# Patient Record
Sex: MALE | Race: WHITE | NOT HISPANIC OR LATINO
[De-identification: names, ages, dates, MRNs, and addresses within clinical notes are randomized per-mention and may not be internally consistent; named-entity substitution may affect disease eponyms.]

---

## 2017-01-25 ENCOUNTER — MED ADMIN CHARGE (OUTPATIENT)
Age: 82
End: 2017-01-25

## 2017-01-25 ENCOUNTER — APPOINTMENT (OUTPATIENT)
Dept: UROLOGY | Facility: CLINIC | Age: 82
End: 2017-01-25
Payer: COMMERCIAL

## 2017-01-25 ENCOUNTER — OUTPATIENT (OUTPATIENT)
Dept: OUTPATIENT SERVICES | Facility: HOSPITAL | Age: 82
LOS: 1 days | End: 2017-01-25
Payer: COMMERCIAL

## 2017-01-25 DIAGNOSIS — R35.0 FREQUENCY OF MICTURITION: ICD-10-CM

## 2017-01-25 DIAGNOSIS — Z98.89 OTHER SPECIFIED POSTPROCEDURAL STATES: Chronic | ICD-10-CM

## 2017-01-25 PROCEDURE — 96402 CHEMO HORMON ANTINEOPL SQ/IM: CPT

## 2017-01-25 PROCEDURE — 96402U: CUSTOM | Mod: NC

## 2017-01-25 RX ORDER — LEUPROLIDE ACETATE 22.5 MG
22.5 KIT INTRAMUSCULAR
Qty: 1 | Refills: 0 | Status: COMPLETED | OUTPATIENT
Start: 2017-01-25

## 2017-01-25 RX ADMIN — LEUPROLIDE ACETATE 0 MG: KIT at 00:00

## 2017-01-26 LAB
PSA FREE FLD-MCNC: 6 %
PSA FREE SERPL-MCNC: 0.06 NG/ML
PSA SERPL-MCNC: 1.04 NG/ML

## 2017-01-30 ENCOUNTER — APPOINTMENT (OUTPATIENT)
Dept: UROLOGY | Facility: CLINIC | Age: 82
End: 2017-01-30

## 2017-02-08 DIAGNOSIS — D40.0 NEOPLASM OF UNCERTAIN BEHAVIOR OF PROSTATE: ICD-10-CM

## 2017-02-08 DIAGNOSIS — C61 MALIGNANT NEOPLASM OF PROSTATE: ICD-10-CM

## 2017-02-08 DIAGNOSIS — R97.20 ELEVATED PROSTATE SPECIFIC ANTIGEN [PSA]: ICD-10-CM

## 2017-03-01 ENCOUNTER — OUTPATIENT (OUTPATIENT)
Dept: OUTPATIENT SERVICES | Facility: HOSPITAL | Age: 82
LOS: 1 days | End: 2017-03-01
Payer: COMMERCIAL

## 2017-03-01 VITALS
RESPIRATION RATE: 16 BRPM | TEMPERATURE: 98 F | DIASTOLIC BLOOD PRESSURE: 80 MMHG | HEIGHT: 68 IN | OXYGEN SATURATION: 99 % | WEIGHT: 130.07 LBS | HEART RATE: 73 BPM | SYSTOLIC BLOOD PRESSURE: 152 MMHG

## 2017-03-01 DIAGNOSIS — Z01.818 ENCOUNTER FOR OTHER PREPROCEDURAL EXAMINATION: ICD-10-CM

## 2017-03-01 DIAGNOSIS — N39.3 STRESS INCONTINENCE (FEMALE) (MALE): ICD-10-CM

## 2017-03-01 DIAGNOSIS — Z96.652 PRESENCE OF LEFT ARTIFICIAL KNEE JOINT: Chronic | ICD-10-CM

## 2017-03-01 DIAGNOSIS — Z96.0 PRESENCE OF UROGENITAL IMPLANTS: Chronic | ICD-10-CM

## 2017-03-01 DIAGNOSIS — Z98.89 OTHER SPECIFIED POSTPROCEDURAL STATES: Chronic | ICD-10-CM

## 2017-03-01 LAB
ANION GAP SERPL CALC-SCNC: 15 MMOL/L — SIGNIFICANT CHANGE UP (ref 5–17)
BUN SERPL-MCNC: 25 MG/DL — HIGH (ref 7–23)
CALCIUM SERPL-MCNC: 10 MG/DL — SIGNIFICANT CHANGE UP (ref 8.4–10.5)
CHLORIDE SERPL-SCNC: 104 MMOL/L — SIGNIFICANT CHANGE UP (ref 96–108)
CO2 SERPL-SCNC: 23 MMOL/L — SIGNIFICANT CHANGE UP (ref 22–31)
CREAT SERPL-MCNC: 0.99 MG/DL — SIGNIFICANT CHANGE UP (ref 0.5–1.3)
GLUCOSE SERPL-MCNC: 97 MG/DL — SIGNIFICANT CHANGE UP (ref 70–99)
HCT VFR BLD CALC: 40 % — SIGNIFICANT CHANGE UP (ref 39–50)
HGB BLD-MCNC: 13.1 G/DL — SIGNIFICANT CHANGE UP (ref 13–17)
MCHC RBC-ENTMCNC: 28.7 PG — SIGNIFICANT CHANGE UP (ref 27–34)
MCHC RBC-ENTMCNC: 32.8 GM/DL — SIGNIFICANT CHANGE UP (ref 32–36)
MCV RBC AUTO: 87.7 FL — SIGNIFICANT CHANGE UP (ref 80–100)
PLATELET # BLD AUTO: 208 K/UL — SIGNIFICANT CHANGE UP (ref 150–400)
POTASSIUM SERPL-MCNC: 4.5 MMOL/L — SIGNIFICANT CHANGE UP (ref 3.5–5.3)
POTASSIUM SERPL-SCNC: 4.5 MMOL/L — SIGNIFICANT CHANGE UP (ref 3.5–5.3)
RBC # BLD: 4.56 M/UL — SIGNIFICANT CHANGE UP (ref 4.2–5.8)
RBC # FLD: 14.5 % — SIGNIFICANT CHANGE UP (ref 10.3–14.5)
SODIUM SERPL-SCNC: 142 MMOL/L — SIGNIFICANT CHANGE UP (ref 135–145)
WBC # BLD: 9.14 K/UL — SIGNIFICANT CHANGE UP (ref 3.8–10.5)
WBC # FLD AUTO: 9.14 K/UL — SIGNIFICANT CHANGE UP (ref 3.8–10.5)

## 2017-03-01 PROCEDURE — G0463: CPT

## 2017-03-01 PROCEDURE — 87086 URINE CULTURE/COLONY COUNT: CPT

## 2017-03-01 PROCEDURE — 87186 SC STD MICRODIL/AGAR DIL: CPT

## 2017-03-01 PROCEDURE — 85027 COMPLETE CBC AUTOMATED: CPT

## 2017-03-01 PROCEDURE — 80048 BASIC METABOLIC PNL TOTAL CA: CPT

## 2017-03-01 RX ORDER — GENTAMICIN SULFATE 40 MG/ML
180 VIAL (ML) INJECTION ONCE
Qty: 0 | Refills: 0 | Status: DISCONTINUED | OUTPATIENT
Start: 2017-05-17 | End: 2017-06-01

## 2017-03-01 NOTE — H&P PST ADULT - PROBLEM SELECTOR PLAN 1
Artificial urinary sphincter revision, cystoscopy.   PST labs send  preprocedure instructions discussed

## 2017-03-01 NOTE — H&P PST ADULT - NSANTHOSAYNRD_GEN_A_CORE
No. LEO screening performed.  STOP BANG Legend: 0-2 = LOW Risk; 3-4 = INTERMEDIATE Risk; 5-8 = HIGH Risk

## 2017-03-01 NOTE — H&P PST ADULT - PSH
History of prostate surgery History of knee replacement, total, left  1999, s/p revision 2001  History of prostate surgery    Status post implantation of artificial urinary sphincter  1996

## 2017-03-01 NOTE — H&P PST ADULT - PMH
[place] (hard of hearing)    Prostate ca    Vertigo [place] (hard of hearing)  uses hearing aids PRN  Prostate ca  Lupron Inj Q 3 months  Vertigo  2005 and 12/2016, uses meclizine PRN

## 2017-03-01 NOTE — H&P PST ADULT - HISTORY OF PRESENT ILLNESS
82 year old male with PMH of prostate cancer s/p prostatectomy s/p Artificial urinary sphincter placement 1996 with complaints of mild incontinence planned for Artificial urinary sphincter revision, cystoscopy.

## 2017-03-01 NOTE — H&P PST ADULT - MUSCULOSKELETAL
details… detailed exam no joint warmth/no joint swelling/no joint erythema/no calf tenderness/normal strength

## 2017-03-06 ENCOUNTER — MESSAGE (OUTPATIENT)
Age: 82
End: 2017-03-06

## 2017-03-06 DIAGNOSIS — B96.20 URINARY TRACT INFECTION, SITE NOT SPECIFIED: ICD-10-CM

## 2017-03-06 DIAGNOSIS — N39.0 URINARY TRACT INFECTION, SITE NOT SPECIFIED: ICD-10-CM

## 2017-03-09 ENCOUNTER — MESSAGE (OUTPATIENT)
Age: 82
End: 2017-03-09

## 2017-04-05 ENCOUNTER — MESSAGE (OUTPATIENT)
Age: 82
End: 2017-04-05

## 2017-04-19 ENCOUNTER — OUTPATIENT (OUTPATIENT)
Dept: OUTPATIENT SERVICES | Facility: HOSPITAL | Age: 82
LOS: 1 days | End: 2017-04-19
Payer: COMMERCIAL

## 2017-04-19 ENCOUNTER — APPOINTMENT (OUTPATIENT)
Dept: UROLOGY | Facility: CLINIC | Age: 82
End: 2017-04-19

## 2017-04-19 DIAGNOSIS — R35.0 FREQUENCY OF MICTURITION: ICD-10-CM

## 2017-04-19 DIAGNOSIS — Z96.0 PRESENCE OF UROGENITAL IMPLANTS: Chronic | ICD-10-CM

## 2017-04-19 DIAGNOSIS — Z96.652 PRESENCE OF LEFT ARTIFICIAL KNEE JOINT: Chronic | ICD-10-CM

## 2017-04-19 DIAGNOSIS — Z98.89 OTHER SPECIFIED POSTPROCEDURAL STATES: Chronic | ICD-10-CM

## 2017-04-19 PROCEDURE — 96402 CHEMO HORMON ANTINEOPL SQ/IM: CPT

## 2017-04-20 LAB
PSA FREE FLD-MCNC: 5.3 %
PSA FREE SERPL-MCNC: 0.06 NG/ML
PSA SERPL-MCNC: 1.2 NG/ML

## 2017-04-21 ENCOUNTER — OUTPATIENT (OUTPATIENT)
Dept: OUTPATIENT SERVICES | Facility: HOSPITAL | Age: 82
LOS: 1 days | End: 2017-04-21
Payer: COMMERCIAL

## 2017-04-21 VITALS
OXYGEN SATURATION: 98 % | DIASTOLIC BLOOD PRESSURE: 80 MMHG | SYSTOLIC BLOOD PRESSURE: 150 MMHG | HEART RATE: 68 BPM | TEMPERATURE: 98 F | RESPIRATION RATE: 16 BRPM | WEIGHT: 132.06 LBS | HEIGHT: 68 IN

## 2017-04-21 DIAGNOSIS — N39.3 STRESS INCONTINENCE (FEMALE) (MALE): ICD-10-CM

## 2017-04-21 DIAGNOSIS — Z01.818 ENCOUNTER FOR OTHER PREPROCEDURAL EXAMINATION: ICD-10-CM

## 2017-04-21 DIAGNOSIS — Z96.652 PRESENCE OF LEFT ARTIFICIAL KNEE JOINT: Chronic | ICD-10-CM

## 2017-04-21 DIAGNOSIS — Z96.0 PRESENCE OF UROGENITAL IMPLANTS: Chronic | ICD-10-CM

## 2017-04-21 DIAGNOSIS — Z98.89 OTHER SPECIFIED POSTPROCEDURAL STATES: Chronic | ICD-10-CM

## 2017-04-21 LAB
ANION GAP SERPL CALC-SCNC: 18 MMOL/L — HIGH (ref 5–17)
BUN SERPL-MCNC: 25 MG/DL — HIGH (ref 7–23)
CALCIUM SERPL-MCNC: 9.8 MG/DL — SIGNIFICANT CHANGE UP (ref 8.4–10.5)
CHLORIDE SERPL-SCNC: 104 MMOL/L — SIGNIFICANT CHANGE UP (ref 96–108)
CO2 SERPL-SCNC: 22 MMOL/L — SIGNIFICANT CHANGE UP (ref 22–31)
CREAT SERPL-MCNC: 0.83 MG/DL — SIGNIFICANT CHANGE UP (ref 0.5–1.3)
GLUCOSE SERPL-MCNC: 160 MG/DL — HIGH (ref 70–99)
POTASSIUM SERPL-MCNC: 4.7 MMOL/L — SIGNIFICANT CHANGE UP (ref 3.5–5.3)
POTASSIUM SERPL-SCNC: 4.7 MMOL/L — SIGNIFICANT CHANGE UP (ref 3.5–5.3)
SODIUM SERPL-SCNC: 144 MMOL/L — SIGNIFICANT CHANGE UP (ref 135–145)

## 2017-04-21 PROCEDURE — 36415 COLL VENOUS BLD VENIPUNCTURE: CPT

## 2017-04-21 PROCEDURE — 87086 URINE CULTURE/COLONY COUNT: CPT

## 2017-04-21 PROCEDURE — 80048 BASIC METABOLIC PNL TOTAL CA: CPT

## 2017-04-21 PROCEDURE — G0463: CPT

## 2017-04-21 RX ORDER — ZOLPIDEM TARTRATE 10 MG/1
1 TABLET ORAL
Qty: 0 | Refills: 0 | COMMUNITY
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
## 2017-04-21 NOTE — H&P PST ADULT - PSH
History of knee replacement, total, left  1999, s/p revision 2001  History of prostate surgery  1992  Status post implantation of artificial urinary sphincter  1996

## 2017-04-21 NOTE — H&P PST ADULT - PMH
Fall  3/15/17, slipped on ice , injured left side of the body, denies broken bones, still with minor soreness.  [place] (hard of hearing)  uses hearing aids PRN  Hypertension    Prostate ca  1992, Lupron Inj Q 3 months  Vertigo  2005 and 12/2016, uses meclizine PRN, last episode 12/2016

## 2017-04-22 LAB
CULTURE RESULTS: NO GROWTH — SIGNIFICANT CHANGE UP
SPECIMEN SOURCE: SIGNIFICANT CHANGE UP

## 2017-04-27 DIAGNOSIS — C61 MALIGNANT NEOPLASM OF PROSTATE: ICD-10-CM

## 2017-04-27 DIAGNOSIS — R97.20 ELEVATED PROSTATE SPECIFIC ANTIGEN [PSA]: ICD-10-CM

## 2017-05-17 ENCOUNTER — TRANSCRIPTION ENCOUNTER (OUTPATIENT)
Age: 82
End: 2017-05-17

## 2017-05-17 ENCOUNTER — APPOINTMENT (OUTPATIENT)
Dept: UROLOGY | Facility: HOSPITAL | Age: 82
End: 2017-05-17

## 2017-05-17 ENCOUNTER — RESULT REVIEW (OUTPATIENT)
Age: 82
End: 2017-05-17

## 2017-05-17 ENCOUNTER — OUTPATIENT (OUTPATIENT)
Dept: OUTPATIENT SERVICES | Facility: HOSPITAL | Age: 82
LOS: 1 days | End: 2017-05-17
Payer: COMMERCIAL

## 2017-05-17 VITALS
DIASTOLIC BLOOD PRESSURE: 79 MMHG | WEIGHT: 132.06 LBS | OXYGEN SATURATION: 99 % | RESPIRATION RATE: 18 BRPM | SYSTOLIC BLOOD PRESSURE: 144 MMHG | TEMPERATURE: 98 F | HEIGHT: 68 IN

## 2017-05-17 VITALS
TEMPERATURE: 98 F | SYSTOLIC BLOOD PRESSURE: 135 MMHG | OXYGEN SATURATION: 98 % | RESPIRATION RATE: 20 BRPM | DIASTOLIC BLOOD PRESSURE: 70 MMHG | HEART RATE: 96 BPM

## 2017-05-17 DIAGNOSIS — Z01.818 ENCOUNTER FOR OTHER PREPROCEDURAL EXAMINATION: ICD-10-CM

## 2017-05-17 DIAGNOSIS — Z98.89 OTHER SPECIFIED POSTPROCEDURAL STATES: Chronic | ICD-10-CM

## 2017-05-17 DIAGNOSIS — Z96.652 PRESENCE OF LEFT ARTIFICIAL KNEE JOINT: Chronic | ICD-10-CM

## 2017-05-17 DIAGNOSIS — Z96.0 PRESENCE OF UROGENITAL IMPLANTS: Chronic | ICD-10-CM

## 2017-05-17 DIAGNOSIS — N39.3 STRESS INCONTINENCE (FEMALE) (MALE): ICD-10-CM

## 2017-05-17 PROCEDURE — 88300 SURGICAL PATH GROSS: CPT

## 2017-05-17 PROCEDURE — C1815: CPT

## 2017-05-17 PROCEDURE — 53445 INSERT URO/VES NCK SPHINCTER: CPT

## 2017-05-17 PROCEDURE — 52000 CYSTOURETHROSCOPY: CPT | Mod: 59

## 2017-05-17 PROCEDURE — 88300 SURGICAL PATH GROSS: CPT | Mod: 26

## 2017-05-17 PROCEDURE — 53447 REMOVE/REPLACE UR SPHINCTER: CPT

## 2017-05-17 RX ORDER — HYDROMORPHONE HYDROCHLORIDE 2 MG/ML
0.25 INJECTION INTRAMUSCULAR; INTRAVENOUS; SUBCUTANEOUS
Qty: 0 | Refills: 0 | Status: DISCONTINUED | OUTPATIENT
Start: 2017-05-17 | End: 2017-05-17

## 2017-05-17 RX ORDER — SODIUM CHLORIDE 9 MG/ML
3 INJECTION INTRAMUSCULAR; INTRAVENOUS; SUBCUTANEOUS EVERY 8 HOURS
Qty: 0 | Refills: 0 | Status: DISCONTINUED | OUTPATIENT
Start: 2017-05-17 | End: 2017-05-17

## 2017-05-17 RX ORDER — ONDANSETRON 8 MG/1
4 TABLET, FILM COATED ORAL ONCE
Qty: 0 | Refills: 0 | Status: COMPLETED | OUTPATIENT
Start: 2017-05-17 | End: 2017-05-17

## 2017-05-17 RX ORDER — ACETAMINOPHEN WITH CODEINE 300MG-30MG
1 TABLET ORAL
Qty: 20 | Refills: 0
Start: 2017-05-17 | End: 2017-05-22

## 2017-05-17 RX ORDER — SODIUM CHLORIDE 9 MG/ML
1000 INJECTION, SOLUTION INTRAVENOUS
Qty: 0 | Refills: 0 | Status: DISCONTINUED | OUTPATIENT
Start: 2017-05-17 | End: 2017-06-01

## 2017-05-17 RX ORDER — DOCUSATE SODIUM 100 MG
1 CAPSULE ORAL
Qty: 40 | Refills: 0
Start: 2017-05-17 | End: 2017-06-06

## 2017-05-17 RX ORDER — VANCOMYCIN HCL 1 G
1000 VIAL (EA) INTRAVENOUS ONCE
Qty: 0 | Refills: 0 | Status: COMPLETED | OUTPATIENT
Start: 2017-05-17 | End: 2017-05-17

## 2017-05-17 RX ORDER — CIPROFLOXACIN LACTATE 400MG/40ML
1 VIAL (ML) INTRAVENOUS
Qty: 7 | Refills: 1
Start: 2017-05-17 | End: 2017-05-30

## 2017-05-17 RX ADMIN — SODIUM CHLORIDE 3 MILLILITER(S): 9 INJECTION INTRAMUSCULAR; INTRAVENOUS; SUBCUTANEOUS at 06:43

## 2017-05-17 RX ADMIN — SODIUM CHLORIDE 125 MILLILITER(S): 9 INJECTION, SOLUTION INTRAVENOUS at 10:33

## 2017-05-17 RX ADMIN — Medication 150 MILLIGRAM(S): at 06:46

## 2017-05-17 RX ADMIN — ONDANSETRON 4 MILLIGRAM(S): 8 TABLET, FILM COATED ORAL at 13:43

## 2017-05-17 NOTE — ASU DISCHARGE PLAN (ADULT/PEDIATRIC). - MEDICATION SUMMARY - MEDICATIONS TO TAKE
I will START or STAY ON the medications listed below when I get home from the hospital:    acetaminophen-codeine 300 mg-15 mg oral tablet  -- 1 tab(s) by mouth every 6 hours MDD:4  -- Caution federal law prohibits the transfer of this drug to any person other  than the person for whom it was prescribed.  May cause drowsiness.  Alcohol may intensify this effect.  Use care when operating dangerous machinery.  This product contains acetaminophen.  Do not use  with any other product containing acetaminophen to prevent possible liver damage.  Using more of this medication than prescribed may cause serious breathing problems.    -- Indication: For pain    Lupron Depot 22.5 mg/3 months intramuscular injection, extended release  --  intramuscular every 3 month, last dose 4/19/17  -- Indication: For prostate cancer    zolpidem 10 mg oral tablet  -- 0.5 tab(s) by mouth once a day (at bedtime), As Needed  -- Indication: For home medication    amLODIPine 5 mg oral tablet  -- 1 tab(s) by mouth once a day  -- Indication: For home medication    Colace sodium 100 mg oral capsule  -- 1 cap(s) by mouth 2 times a day  -- Medication should be taken with plenty of water.    -- Indication: For constipation    ciprofloxacin 500 mg oral tablet, extended release  -- 1 tab(s) by mouth every 24 hours  -- Avoid prolonged or excessive exposure to direct and/or artificial sunlight while taking this medication.  Check with your doctor before becoming pregnant.  Do not take dairy products, antacids, or iron preparations within one hour of this medication.  Finish all this medication unless otherwise directed by prescriber.  It is very important that you take or use this exactly as directed.  Do not skip doses or discontinue unless directed by your doctor.  Medication should be taken with plenty of water.  Obtain medical advice before taking any non-prescription drugs as some may affect the action of this medication.  Swallow whole.  Do not crush.  This drug may impair the ability to drive or operate machinery.  Use care until you become familiar with its effects.    -- Indication: For antibiotic    Vitamin D2 50,000 intl units (1.25 mg) oral capsule  -- 1 cap(s) by mouth once a week  -- Indication: For home medication

## 2017-05-18 ENCOUNTER — APPOINTMENT (OUTPATIENT)
Dept: UROLOGY | Facility: CLINIC | Age: 82
End: 2017-05-18

## 2017-05-18 VITALS
WEIGHT: 130 LBS | RESPIRATION RATE: 17 BRPM | HEART RATE: 75 BPM | DIASTOLIC BLOOD PRESSURE: 72 MMHG | TEMPERATURE: 98.2 F | HEIGHT: 68 IN | BODY MASS INDEX: 19.7 KG/M2 | SYSTOLIC BLOOD PRESSURE: 145 MMHG

## 2017-05-22 ENCOUNTER — CHART COPY (OUTPATIENT)
Age: 82
End: 2017-05-22

## 2017-05-22 ENCOUNTER — EMERGENCY (EMERGENCY)
Facility: HOSPITAL | Age: 82
LOS: 1 days | Discharge: HOME | End: 2017-05-22
Attending: EMERGENCY MEDICINE | Admitting: EMERGENCY MEDICINE
Payer: COMMERCIAL

## 2017-05-22 VITALS
DIASTOLIC BLOOD PRESSURE: 74 MMHG | SYSTOLIC BLOOD PRESSURE: 129 MMHG | RESPIRATION RATE: 17 BRPM | TEMPERATURE: 98 F | HEART RATE: 82 BPM | OXYGEN SATURATION: 99 %

## 2017-05-22 VITALS
OXYGEN SATURATION: 100 % | DIASTOLIC BLOOD PRESSURE: 81 MMHG | RESPIRATION RATE: 18 BRPM | HEART RATE: 66 BPM | SYSTOLIC BLOOD PRESSURE: 160 MMHG

## 2017-05-22 DIAGNOSIS — Z96.0 PRESENCE OF UROGENITAL IMPLANTS: Chronic | ICD-10-CM

## 2017-05-22 DIAGNOSIS — Z98.89 OTHER SPECIFIED POSTPROCEDURAL STATES: Chronic | ICD-10-CM

## 2017-05-22 DIAGNOSIS — I10 ESSENTIAL (PRIMARY) HYPERTENSION: ICD-10-CM

## 2017-05-22 DIAGNOSIS — R41.82 ALTERED MENTAL STATUS, UNSPECIFIED: ICD-10-CM

## 2017-05-22 DIAGNOSIS — N39.0 URINARY TRACT INFECTION, SITE NOT SPECIFIED: ICD-10-CM

## 2017-05-22 DIAGNOSIS — Z88.0 ALLERGY STATUS TO PENICILLIN: ICD-10-CM

## 2017-05-22 DIAGNOSIS — Z96.652 PRESENCE OF LEFT ARTIFICIAL KNEE JOINT: Chronic | ICD-10-CM

## 2017-05-22 DIAGNOSIS — Z79.899 OTHER LONG TERM (CURRENT) DRUG THERAPY: ICD-10-CM

## 2017-05-22 DIAGNOSIS — Z96.652 PRESENCE OF LEFT ARTIFICIAL KNEE JOINT: ICD-10-CM

## 2017-05-22 DIAGNOSIS — Z98.890 OTHER SPECIFIED POSTPROCEDURAL STATES: ICD-10-CM

## 2017-05-22 LAB
ALBUMIN SERPL ELPH-MCNC: 3.9 G/DL — SIGNIFICANT CHANGE UP (ref 3.3–5)
ALP SERPL-CCNC: 75 U/L — SIGNIFICANT CHANGE UP (ref 40–120)
ALT FLD-CCNC: 9 U/L RC — LOW (ref 10–45)
ANION GAP SERPL CALC-SCNC: 12 MMOL/L — SIGNIFICANT CHANGE UP (ref 5–17)
APPEARANCE UR: ABNORMAL
APTT BLD: 29.6 SEC — SIGNIFICANT CHANGE UP (ref 27.5–37.4)
AST SERPL-CCNC: 16 U/L — SIGNIFICANT CHANGE UP (ref 10–40)
BACTERIA # UR AUTO: ABNORMAL /HPF
BASOPHILS # BLD AUTO: 0 K/UL — SIGNIFICANT CHANGE UP (ref 0–0.2)
BASOPHILS NFR BLD AUTO: 0.2 % — SIGNIFICANT CHANGE UP (ref 0–2)
BILIRUB SERPL-MCNC: 0.5 MG/DL — SIGNIFICANT CHANGE UP (ref 0.2–1.2)
BILIRUB UR-MCNC: NEGATIVE — SIGNIFICANT CHANGE UP
BUN SERPL-MCNC: 22 MG/DL — SIGNIFICANT CHANGE UP (ref 7–23)
CALCIUM SERPL-MCNC: 9.4 MG/DL — SIGNIFICANT CHANGE UP (ref 8.4–10.5)
CHLORIDE SERPL-SCNC: 108 MMOL/L — SIGNIFICANT CHANGE UP (ref 96–108)
CO2 SERPL-SCNC: 26 MMOL/L — SIGNIFICANT CHANGE UP (ref 22–31)
COLOR SPEC: YELLOW — SIGNIFICANT CHANGE UP
COMMENT - URINE: SIGNIFICANT CHANGE UP
CREAT SERPL-MCNC: 0.94 MG/DL — SIGNIFICANT CHANGE UP (ref 0.5–1.3)
DIFF PNL FLD: NEGATIVE — SIGNIFICANT CHANGE UP
EOSINOPHIL # BLD AUTO: 0.6 K/UL — HIGH (ref 0–0.5)
EOSINOPHIL NFR BLD AUTO: 6.4 % — HIGH (ref 0–6)
EPI CELLS # UR: SIGNIFICANT CHANGE UP /HPF
GAS PNL BLDV: SIGNIFICANT CHANGE UP
GLUCOSE SERPL-MCNC: 92 MG/DL — SIGNIFICANT CHANGE UP (ref 70–99)
GLUCOSE UR QL: NEGATIVE — SIGNIFICANT CHANGE UP
HCT VFR BLD CALC: 37.1 % — LOW (ref 39–50)
HGB BLD-MCNC: 12.4 G/DL — LOW (ref 13–17)
INR BLD: 1.02 RATIO — SIGNIFICANT CHANGE UP (ref 0.88–1.16)
KETONES UR-MCNC: NEGATIVE — SIGNIFICANT CHANGE UP
LEUKOCYTE ESTERASE UR-ACNC: ABNORMAL
LYMPHOCYTES # BLD AUTO: 1.8 K/UL — SIGNIFICANT CHANGE UP (ref 1–3.3)
LYMPHOCYTES # BLD AUTO: 20.1 % — SIGNIFICANT CHANGE UP (ref 13–44)
MCHC RBC-ENTMCNC: 30.1 PG — SIGNIFICANT CHANGE UP (ref 27–34)
MCHC RBC-ENTMCNC: 33.5 GM/DL — SIGNIFICANT CHANGE UP (ref 32–36)
MCV RBC AUTO: 89.6 FL — SIGNIFICANT CHANGE UP (ref 80–100)
MONOCYTES # BLD AUTO: 0.6 K/UL — SIGNIFICANT CHANGE UP (ref 0–0.9)
MONOCYTES NFR BLD AUTO: 6.7 % — SIGNIFICANT CHANGE UP (ref 2–14)
NEUTROPHILS # BLD AUTO: 5.9 K/UL — SIGNIFICANT CHANGE UP (ref 1.8–7.4)
NEUTROPHILS NFR BLD AUTO: 66.5 % — SIGNIFICANT CHANGE UP (ref 43–77)
NITRITE UR-MCNC: NEGATIVE — SIGNIFICANT CHANGE UP
PH UR: 5.5 — SIGNIFICANT CHANGE UP (ref 5–8)
PLATELET # BLD AUTO: 204 K/UL — SIGNIFICANT CHANGE UP (ref 150–400)
POTASSIUM SERPL-MCNC: 4.1 MMOL/L — SIGNIFICANT CHANGE UP (ref 3.5–5.3)
POTASSIUM SERPL-SCNC: 4.1 MMOL/L — SIGNIFICANT CHANGE UP (ref 3.5–5.3)
PROT SERPL-MCNC: 6.4 G/DL — SIGNIFICANT CHANGE UP (ref 6–8.3)
PROT UR-MCNC: 30 MG/DL
PROTHROM AB SERPL-ACNC: 11.1 SEC — SIGNIFICANT CHANGE UP (ref 9.8–12.7)
RBC # BLD: 4.14 M/UL — LOW (ref 4.2–5.8)
RBC # FLD: 12.3 % — SIGNIFICANT CHANGE UP (ref 10.3–14.5)
RBC CASTS # UR COMP ASSIST: SIGNIFICANT CHANGE UP /HPF (ref 0–2)
SODIUM SERPL-SCNC: 146 MMOL/L — HIGH (ref 135–145)
SP GR SPEC: 1.03 — HIGH (ref 1.01–1.02)
UROBILINOGEN FLD QL: 1
WBC # BLD: 8.9 K/UL — SIGNIFICANT CHANGE UP (ref 3.8–10.5)
WBC # FLD AUTO: 8.9 K/UL — SIGNIFICANT CHANGE UP (ref 3.8–10.5)
WBC UR QL: >50 /HPF (ref 0–5)

## 2017-05-22 PROCEDURE — 85014 HEMATOCRIT: CPT

## 2017-05-22 PROCEDURE — 70450 CT HEAD/BRAIN W/O DYE: CPT

## 2017-05-22 PROCEDURE — 84132 ASSAY OF SERUM POTASSIUM: CPT

## 2017-05-22 PROCEDURE — 84295 ASSAY OF SERUM SODIUM: CPT

## 2017-05-22 PROCEDURE — 82330 ASSAY OF CALCIUM: CPT

## 2017-05-22 PROCEDURE — 71045 X-RAY EXAM CHEST 1 VIEW: CPT

## 2017-05-22 PROCEDURE — 70450 CT HEAD/BRAIN W/O DYE: CPT | Mod: 26

## 2017-05-22 PROCEDURE — 71010: CPT | Mod: 26

## 2017-05-22 PROCEDURE — 85730 THROMBOPLASTIN TIME PARTIAL: CPT

## 2017-05-22 PROCEDURE — 80053 COMPREHEN METABOLIC PANEL: CPT

## 2017-05-22 PROCEDURE — 83605 ASSAY OF LACTIC ACID: CPT

## 2017-05-22 PROCEDURE — 85027 COMPLETE CBC AUTOMATED: CPT

## 2017-05-22 PROCEDURE — 99284 EMERGENCY DEPT VISIT MOD MDM: CPT | Mod: GC

## 2017-05-22 PROCEDURE — 82803 BLOOD GASES ANY COMBINATION: CPT

## 2017-05-22 PROCEDURE — 85610 PROTHROMBIN TIME: CPT

## 2017-05-22 PROCEDURE — 81001 URINALYSIS AUTO W/SCOPE: CPT

## 2017-05-22 PROCEDURE — 87086 URINE CULTURE/COLONY COUNT: CPT

## 2017-05-22 PROCEDURE — 82947 ASSAY GLUCOSE BLOOD QUANT: CPT

## 2017-05-22 PROCEDURE — 99284 EMERGENCY DEPT VISIT MOD MDM: CPT | Mod: 25

## 2017-05-22 PROCEDURE — 82435 ASSAY OF BLOOD CHLORIDE: CPT

## 2017-05-22 PROCEDURE — 82565 ASSAY OF CREATININE: CPT

## 2017-05-22 RX ORDER — CEFTRIAXONE 500 MG/1
1 INJECTION, POWDER, FOR SOLUTION INTRAMUSCULAR; INTRAVENOUS ONCE
Qty: 0 | Refills: 0 | Status: DISCONTINUED | OUTPATIENT
Start: 2017-05-22 | End: 2017-05-22

## 2017-05-22 RX ORDER — AZTREONAM 2 G
1 VIAL (EA) INJECTION
Qty: 20 | Refills: 0
Start: 2017-05-22 | End: 2017-06-01

## 2017-05-22 RX ADMIN — Medication 1 TABLET(S): at 21:20

## 2017-05-22 NOTE — ED ADULT NURSE NOTE - OBJECTIVE STATEMENT
Pt S/P artifical Urinary sphincter placement 6 days ago. Family C/O AMS with irrelevant  behaviour X 2 days As stated by the family pt was very hyperactive  agitated  & not  by himself Pt  was on Cipro family states he may be allergic to Cipro & they called the urologist who asked them to stop the medication  Family feels he is better with stopping the medication . pt still looks dull quite here  Obeys commands & looks pale . Emily N/V/D C/o constipation X 1 week.  afebrile here.Pt is been evaluate by the Ed team

## 2017-05-22 NOTE — ED ADULT NURSE REASSESSMENT NOTE - NS ED NURSE REASSESS COMMENT FT1
Pt received from Natividad HWANG.  Pt laying in stretcher comfortably, calm, no distress, family bedside, results reviewed in detail with family and POC and decision to admit discussed.  VSS.  Spoke with daughter separately and offered social work consult, daughter declines at this time, advised that she may accept services at any time.

## 2017-05-22 NOTE — ED ADULT NURSE NOTE - CAS EDN DISCHARGE ASSESSMENT
family states pt is back to his baseline mental status, they prefer to bring pt home/Awake/Alert and oriented to person, place and time

## 2017-05-22 NOTE — ED PROVIDER NOTE - PLAN OF CARE
We have recommeded admission to hospital risks of leaving include severe infection.falls.  See your doctor ASAP and follow  up with neurology Dr [phone]  Return if you have any problems. Like fever confusion  Bactrim DS Twice daily  Ray Morrison MD, Facep

## 2017-05-22 NOTE — ED PROVIDER NOTE - PROGRESS NOTE DETAILS
Discussed with urology PA, will come evaluate. Discussed with pt spouse daughter advised admission for uti and acute sundowning. Pt family informed of risks and declined. Prefer to take pt home an follow up pmd,   Ray Morrison MD, Facep Pt back to baseline mental status as per family. Urology saw patient and recommended change atbx to bactrim, and can f/u as outpatient.

## 2017-05-22 NOTE — ED PROVIDER NOTE - PMH
Fall  3/15/17, slipped on ice , injured left side of the body, denies broken bones, still with minor soreness.  [place] (hard of hearing)  uses hearing aids PRN  Hypertension    Prostate ca  1992, Lupron Inj Q 3 months  Vertigo  2005 and 12/2016, uses meclizine PRN, last episode 12/2016

## 2017-05-22 NOTE — ED PROVIDER NOTE - ATTENDING CONTRIBUTION TO CARE
[facility],  	[facility]  82y male pmh HTN, Ca prostate on lupron, Sp urinary sphinter last replaced last week with urology. No dm,hld,cad, travel,trauma. Pt comes to ed co Hallucinating last night, compative. Improved during day. No fever chills cough, sob,cp, diarrhea. Has had episodes of  hallucinating in past less severe. PE Wdwn Male awake alert NCAT neck supple chest clear ap, abd soft +bs neruo no focal defects. Power 55. all ext pain light toucn intact gcs 15 speech fleunt oriented x3  Ray Morrison MD, Facep

## 2017-05-22 NOTE — ED PROVIDER NOTE - CARE PLAN
Principal Discharge DX:	UTI (urinary tract infection)  Instructions for follow-up, activity and diet:	We have recommeded admission to hospital risks of leaving include severe infection.falls.  See your doctor ASAP and follow  up with neurology Dr [phone]  Return if you have any problems. Like fever confusion  Bactrim DS Twice daily  Ray Morrison MD, Facep Principal Discharge DX:	UTI (urinary tract infection)  Instructions for follow-up, activity and diet:	We have recommeded admission to hospital risks of leaving include severe infection.falls.  See your doctor ASAP and follow  up with neurology Dr [phone]  Return if you have any problems. Like fever confusion  Bactrim DS Twice daily  Ray Morrison MD, Facep

## 2017-05-22 NOTE — ED PROVIDER NOTE - MEDICAL DECISION MAKING DETAILS
Elderly male with hx or recent urologic procedure pw sundowning last night now improving. Ro infection, mass lesion check ct head, cxr, labs ua consult urology  Ray Morrison MD, Facep

## 2017-05-22 NOTE — ED ADULT NURSE NOTE - PMH
Fall  3/15/17, slipped on ice , injured left side of the body, denies broken bones, still with minor soreness.  [place] (hard of hearing)  uses hearing aids PRN  Hypertension    Prostate ca  1992, Lupron Inj Q 3 months  Vertigo  2005 and 12/2016, uses meclizine PRN, last episode 12/2016

## 2017-05-22 NOTE — ED PROVIDER NOTE - OBJECTIVE STATEMENT
81 yo m with history of artificial urinary sphincter placement last wednesday presents with altered mental status that started last night. Patient reportedly, had an episode similar to sundowning- patient was confused as to location, did not recognize family members. The only new medication added since the surgery was ciprofloxacin. They called their urologist who advised to stop the medication. The following day, the patient improved in mental status but is still aox2.

## 2017-05-23 LAB
CULTURE RESULTS: NO GROWTH — SIGNIFICANT CHANGE UP
SPECIMEN SOURCE: SIGNIFICANT CHANGE UP

## 2017-06-05 ENCOUNTER — APPOINTMENT (OUTPATIENT)
Dept: UROLOGY | Facility: CLINIC | Age: 82
End: 2017-06-05

## 2017-06-05 VITALS — DIASTOLIC BLOOD PRESSURE: 67 MMHG | SYSTOLIC BLOOD PRESSURE: 118 MMHG | RESPIRATION RATE: 17 BRPM | HEART RATE: 72 BPM

## 2017-06-07 LAB — SURGICAL PATHOLOGY STUDY: SIGNIFICANT CHANGE UP

## 2017-06-15 ENCOUNTER — APPOINTMENT (OUTPATIENT)
Dept: UROLOGY | Facility: CLINIC | Age: 82
End: 2017-06-15

## 2017-06-16 ENCOUNTER — RX RENEWAL (OUTPATIENT)
Age: 82
End: 2017-06-16

## 2017-07-17 ENCOUNTER — APPOINTMENT (OUTPATIENT)
Dept: UROLOGY | Facility: CLINIC | Age: 82
End: 2017-07-17

## 2017-07-17 VITALS
RESPIRATION RATE: 17 BRPM | TEMPERATURE: 98 F | SYSTOLIC BLOOD PRESSURE: 147 MMHG | HEART RATE: 62 BPM | DIASTOLIC BLOOD PRESSURE: 77 MMHG

## 2017-07-26 ENCOUNTER — OUTPATIENT (OUTPATIENT)
Dept: OUTPATIENT SERVICES | Facility: HOSPITAL | Age: 82
LOS: 1 days | End: 2017-07-26
Payer: COMMERCIAL

## 2017-07-26 ENCOUNTER — APPOINTMENT (OUTPATIENT)
Dept: UROLOGY | Facility: CLINIC | Age: 82
End: 2017-07-26

## 2017-07-26 VITALS — SYSTOLIC BLOOD PRESSURE: 182 MMHG | TEMPERATURE: 98.1 F | DIASTOLIC BLOOD PRESSURE: 81 MMHG | HEART RATE: 56 BPM

## 2017-07-26 DIAGNOSIS — R35.0 FREQUENCY OF MICTURITION: ICD-10-CM

## 2017-07-26 DIAGNOSIS — C61 MALIGNANT NEOPLASM OF PROSTATE: ICD-10-CM

## 2017-07-26 DIAGNOSIS — Z00.00 ENCOUNTER FOR GENERAL ADULT MEDICAL EXAMINATION WITHOUT ABNORMAL FINDINGS: ICD-10-CM

## 2017-07-26 DIAGNOSIS — Z96.0 PRESENCE OF UROGENITAL IMPLANTS: Chronic | ICD-10-CM

## 2017-07-26 DIAGNOSIS — Z96.652 PRESENCE OF LEFT ARTIFICIAL KNEE JOINT: Chronic | ICD-10-CM

## 2017-07-26 DIAGNOSIS — R97.20 ELEVATED PROSTATE SPECIFIC ANTIGEN [PSA]: ICD-10-CM

## 2017-07-26 DIAGNOSIS — Z98.89 OTHER SPECIFIED POSTPROCEDURAL STATES: Chronic | ICD-10-CM

## 2017-07-26 PROCEDURE — 96402 CHEMO HORMON ANTINEOPL SQ/IM: CPT

## 2017-07-26 RX ORDER — LEUPROLIDE ACETATE 22.5 MG
22.5 KIT INTRAMUSCULAR
Qty: 1 | Refills: 0 | Status: COMPLETED | OUTPATIENT
Start: 2017-07-26

## 2017-07-26 RX ORDER — LEUPROLIDE ACETATE 22.5 MG
22.5 KIT INTRAMUSCULAR
Qty: 1 | Refills: 1 | Status: COMPLETED | OUTPATIENT
Start: 2017-07-25 | End: 2017-07-26

## 2017-07-27 LAB
PSA FREE FLD-MCNC: 6.1
PSA FREE SERPL-MCNC: 0.07 NG/ML
PSA SERPL-MCNC: 1.15 NG/ML

## 2017-11-01 ENCOUNTER — APPOINTMENT (OUTPATIENT)
Dept: UROLOGY | Facility: CLINIC | Age: 82
End: 2017-11-01
Payer: COMMERCIAL

## 2017-11-01 ENCOUNTER — OUTPATIENT (OUTPATIENT)
Dept: OUTPATIENT SERVICES | Facility: HOSPITAL | Age: 82
LOS: 1 days | End: 2017-11-01
Payer: COMMERCIAL

## 2017-11-01 VITALS — SYSTOLIC BLOOD PRESSURE: 180 MMHG | RESPIRATION RATE: 17 BRPM | DIASTOLIC BLOOD PRESSURE: 93 MMHG | HEART RATE: 61 BPM

## 2017-11-01 DIAGNOSIS — C61 MALIGNANT NEOPLASM OF PROSTATE: ICD-10-CM

## 2017-11-01 DIAGNOSIS — Z98.89 OTHER SPECIFIED POSTPROCEDURAL STATES: Chronic | ICD-10-CM

## 2017-11-01 DIAGNOSIS — Z96.652 PRESENCE OF LEFT ARTIFICIAL KNEE JOINT: Chronic | ICD-10-CM

## 2017-11-01 DIAGNOSIS — Z96.0 PRESENCE OF UROGENITAL IMPLANTS: Chronic | ICD-10-CM

## 2017-11-01 DIAGNOSIS — R35.0 FREQUENCY OF MICTURITION: ICD-10-CM

## 2017-11-01 DIAGNOSIS — R97.20 ELEVATED PROSTATE SPECIFIC ANTIGEN [PSA]: ICD-10-CM

## 2017-11-01 PROCEDURE — 99213 OFFICE O/P EST LOW 20 MIN: CPT

## 2017-11-01 PROCEDURE — 96402 CHEMO HORMON ANTINEOPL SQ/IM: CPT

## 2017-11-02 LAB
PSA FREE FLD-MCNC: 5.8
PSA FREE SERPL-MCNC: 0.07 NG/ML
PSA SERPL-MCNC: 1.2 NG/ML

## 2017-11-09 ENCOUNTER — CHART COPY (OUTPATIENT)
Age: 82
End: 2017-11-09

## 2017-12-05 ENCOUNTER — OUTPATIENT (OUTPATIENT)
Dept: OUTPATIENT SERVICES | Facility: HOSPITAL | Age: 82
LOS: 1 days | Discharge: HOME | End: 2017-12-05
Payer: COMMERCIAL

## 2017-12-05 ENCOUNTER — RESULT REVIEW (OUTPATIENT)
Age: 82
End: 2017-12-05

## 2017-12-05 ENCOUNTER — INPATIENT (INPATIENT)
Facility: HOSPITAL | Age: 82
LOS: 0 days | Discharge: HOME | DRG: 948 | End: 2017-12-06
Attending: SPECIALIST | Admitting: SPECIALIST
Payer: COMMERCIAL

## 2017-12-05 VITALS
OXYGEN SATURATION: 97 % | HEART RATE: 72 BPM | RESPIRATION RATE: 16 BRPM | TEMPERATURE: 98 F | DIASTOLIC BLOOD PRESSURE: 75 MMHG | SYSTOLIC BLOOD PRESSURE: 159 MMHG

## 2017-12-05 DIAGNOSIS — Z96.0 PRESENCE OF UROGENITAL IMPLANTS: Chronic | ICD-10-CM

## 2017-12-05 DIAGNOSIS — Z98.89 OTHER SPECIFIED POSTPROCEDURAL STATES: Chronic | ICD-10-CM

## 2017-12-05 DIAGNOSIS — R29.898 OTHER SYMPTOMS AND SIGNS INVOLVING THE MUSCULOSKELETAL SYSTEM: ICD-10-CM

## 2017-12-05 DIAGNOSIS — Z96.652 PRESENCE OF LEFT ARTIFICIAL KNEE JOINT: Chronic | ICD-10-CM

## 2017-12-05 LAB
ANION GAP SERPL CALC-SCNC: 10 MMOL/L — SIGNIFICANT CHANGE UP (ref 5–17)
BUN SERPL-MCNC: 15 MG/DL — SIGNIFICANT CHANGE UP (ref 7–23)
CALCIUM SERPL-MCNC: 9.7 MG/DL — SIGNIFICANT CHANGE UP (ref 8.4–10.5)
CHLORIDE SERPL-SCNC: 104 MMOL/L — SIGNIFICANT CHANGE UP (ref 96–108)
CO2 SERPL-SCNC: 26 MMOL/L — SIGNIFICANT CHANGE UP (ref 22–31)
CREAT SERPL-MCNC: 0.95 MG/DL — SIGNIFICANT CHANGE UP (ref 0.5–1.3)
GLUCOSE SERPL-MCNC: 154 MG/DL — HIGH (ref 70–99)
HCT VFR BLD CALC: 36.5 % — LOW (ref 39–50)
HGB BLD-MCNC: 12.1 G/DL — LOW (ref 13–17)
MAGNESIUM SERPL-MCNC: 2 MG/DL — SIGNIFICANT CHANGE UP (ref 1.6–2.6)
MCHC RBC-ENTMCNC: 28.9 PG — SIGNIFICANT CHANGE UP (ref 27–34)
MCHC RBC-ENTMCNC: 33.2 G/DL — SIGNIFICANT CHANGE UP (ref 32–36)
MCV RBC AUTO: 87.1 FL — SIGNIFICANT CHANGE UP (ref 80–100)
PHOSPHATE SERPL-MCNC: 4 MG/DL — SIGNIFICANT CHANGE UP (ref 2.5–4.5)
PLATELET # BLD AUTO: 167 K/UL — SIGNIFICANT CHANGE UP (ref 150–400)
POTASSIUM SERPL-MCNC: 4.3 MMOL/L — SIGNIFICANT CHANGE UP (ref 3.5–5.3)
POTASSIUM SERPL-SCNC: 4.3 MMOL/L — SIGNIFICANT CHANGE UP (ref 3.5–5.3)
RBC # BLD: 4.19 M/UL — LOW (ref 4.2–5.8)
RBC # FLD: 14 % — SIGNIFICANT CHANGE UP (ref 10.3–16.9)
SODIUM SERPL-SCNC: 140 MMOL/L — SIGNIFICANT CHANGE UP (ref 135–145)
WBC # BLD: 11.9 K/UL — HIGH (ref 3.8–10.5)
WBC # FLD AUTO: 11.9 K/UL — HIGH (ref 3.8–10.5)

## 2017-12-05 PROCEDURE — 53447 REMOVE/REPLACE UR SPHINCTER: CPT | Mod: AS

## 2017-12-05 RX ORDER — ONDANSETRON 8 MG/1
4 TABLET, FILM COATED ORAL EVERY 8 HOURS
Qty: 0 | Refills: 0 | Status: DISCONTINUED | OUTPATIENT
Start: 2017-12-05 | End: 2017-12-06

## 2017-12-05 RX ORDER — DOCUSATE SODIUM 100 MG
100 CAPSULE ORAL
Qty: 0 | Refills: 0 | Status: DISCONTINUED | OUTPATIENT
Start: 2017-12-05 | End: 2017-12-06

## 2017-12-05 RX ORDER — HYDROMORPHONE HYDROCHLORIDE 2 MG/ML
2 INJECTION INTRAMUSCULAR; INTRAVENOUS; SUBCUTANEOUS EVERY 4 HOURS
Qty: 0 | Refills: 0 | Status: DISCONTINUED | OUTPATIENT
Start: 2017-12-05 | End: 2017-12-06

## 2017-12-05 RX ORDER — OXYCODONE AND ACETAMINOPHEN 5; 325 MG/1; MG/1
2 TABLET ORAL EVERY 4 HOURS
Qty: 0 | Refills: 0 | Status: DISCONTINUED | OUTPATIENT
Start: 2017-12-05 | End: 2017-12-05

## 2017-12-05 RX ORDER — CEPHALEXIN 500 MG
500 CAPSULE ORAL EVERY 12 HOURS
Qty: 0 | Refills: 0 | Status: DISCONTINUED | OUTPATIENT
Start: 2017-12-05 | End: 2017-12-06

## 2017-12-05 RX ORDER — OXYCODONE AND ACETAMINOPHEN 5; 325 MG/1; MG/1
1 TABLET ORAL EVERY 4 HOURS
Qty: 0 | Refills: 0 | Status: DISCONTINUED | OUTPATIENT
Start: 2017-12-05 | End: 2017-12-05

## 2017-12-05 RX ORDER — SODIUM CHLORIDE 9 MG/ML
1000 INJECTION INTRAMUSCULAR; INTRAVENOUS; SUBCUTANEOUS
Qty: 0 | Refills: 0 | Status: DISCONTINUED | OUTPATIENT
Start: 2017-12-05 | End: 2017-12-06

## 2017-12-05 RX ORDER — HYDROMORPHONE HYDROCHLORIDE 2 MG/ML
4 INJECTION INTRAMUSCULAR; INTRAVENOUS; SUBCUTANEOUS EVERY 4 HOURS
Qty: 0 | Refills: 0 | Status: DISCONTINUED | OUTPATIENT
Start: 2017-12-05 | End: 2017-12-06

## 2017-12-05 RX ORDER — ACETAMINOPHEN 500 MG
650 TABLET ORAL EVERY 6 HOURS
Qty: 0 | Refills: 0 | Status: DISCONTINUED | OUTPATIENT
Start: 2017-12-05 | End: 2017-12-06

## 2017-12-05 RX ADMIN — Medication 100 MILLIGRAM(S): at 23:54

## 2017-12-05 RX ADMIN — Medication 500 MILLIGRAM(S): at 23:53
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
## 2017-12-05 NOTE — H&P ADULT - ASSESSMENT
83 yo male s/p revision urinary sphincter; Weakness 83 yo male s/p revision urinary sphincter; Weakness LE's

## 2017-12-05 NOTE — H&P ADULT - NSHPPHYSICALEXAM_GEN_ALL_CORE
Gen:   Abd:   :  EXT: Gen: alert and awake  Abd: soft, NT, ND  : FC intact, urine clear, Incision site scrotum sl tender, no bleeding, no swelling  EXT: no edema, no calf tenderness.

## 2017-12-05 NOTE — H&P ADULT - REASON FOR ADMISSION
weakness weakness of his lower extremities, closer monitoring after a revision of his urinary sphincter

## 2017-12-05 NOTE — H&P ADULT - HISTORY OF PRESENT ILLNESS
83 yo male s/p Revision urinary sphincter today at [facility]. Patient was not able to stand on his own and felt weak to stay up by himself without assistance. Patient admitted for observation. No Fever/chills. No N/V. No CP/SOB. 81 yo male s/p Revision urinary sphincter today at [facility]. Patient was not able to stand on his own and felt weak to stay up by himself without assistance. Patient admitted for observation. No Fever/chills. No N/V. No CP/SOB. No numbness/tingling. No headaches.

## 2017-12-05 NOTE — H&P ADULT - PROBLEM SELECTOR PLAN 1
-admit  -PT to eval in am  -f/u labs  -continue present care -admit  -PT to eval in am  -f/u labs  -cephalexin 750mg BID  -continue present care

## 2017-12-06 ENCOUNTER — TRANSCRIPTION ENCOUNTER (OUTPATIENT)
Age: 82
End: 2017-12-06

## 2017-12-06 VITALS
SYSTOLIC BLOOD PRESSURE: 126 MMHG | RESPIRATION RATE: 17 BRPM | TEMPERATURE: 99 F | OXYGEN SATURATION: 98 % | DIASTOLIC BLOOD PRESSURE: 61 MMHG | HEART RATE: 86 BPM

## 2017-12-06 LAB
ANION GAP SERPL CALC-SCNC: 12 MMOL/L — SIGNIFICANT CHANGE UP (ref 5–17)
BUN SERPL-MCNC: 18 MG/DL — SIGNIFICANT CHANGE UP (ref 7–23)
CALCIUM SERPL-MCNC: 9 MG/DL — SIGNIFICANT CHANGE UP (ref 8.4–10.5)
CHLORIDE SERPL-SCNC: 107 MMOL/L — SIGNIFICANT CHANGE UP (ref 96–108)
CO2 SERPL-SCNC: 25 MMOL/L — SIGNIFICANT CHANGE UP (ref 22–31)
CREAT SERPL-MCNC: 0.94 MG/DL — SIGNIFICANT CHANGE UP (ref 0.5–1.3)
GLUCOSE SERPL-MCNC: 87 MG/DL — SIGNIFICANT CHANGE UP (ref 70–99)
HCT VFR BLD CALC: 35.8 % — LOW (ref 39–50)
HCT VFR BLD CALC: 36.4 % — LOW (ref 39–50)
HGB BLD-MCNC: 11.6 G/DL — LOW (ref 13–17)
HGB BLD-MCNC: 11.8 G/DL — LOW (ref 13–17)
MAGNESIUM SERPL-MCNC: 2.1 MG/DL — SIGNIFICANT CHANGE UP (ref 1.6–2.6)
MCHC RBC-ENTMCNC: 28.2 PG — SIGNIFICANT CHANGE UP (ref 27–34)
MCHC RBC-ENTMCNC: 28.3 PG — SIGNIFICANT CHANGE UP (ref 27–34)
MCHC RBC-ENTMCNC: 32.4 G/DL — SIGNIFICANT CHANGE UP (ref 32–36)
MCHC RBC-ENTMCNC: 32.4 G/DL — SIGNIFICANT CHANGE UP (ref 32–36)
MCV RBC AUTO: 86.9 FL — SIGNIFICANT CHANGE UP (ref 80–100)
MCV RBC AUTO: 87.3 FL — SIGNIFICANT CHANGE UP (ref 80–100)
PLATELET # BLD AUTO: 178 K/UL — SIGNIFICANT CHANGE UP (ref 150–400)
PLATELET # BLD AUTO: 185 K/UL — SIGNIFICANT CHANGE UP (ref 150–400)
POTASSIUM SERPL-MCNC: 4.3 MMOL/L — SIGNIFICANT CHANGE UP (ref 3.5–5.3)
POTASSIUM SERPL-SCNC: 4.3 MMOL/L — SIGNIFICANT CHANGE UP (ref 3.5–5.3)
RBC # BLD: 4.1 M/UL — LOW (ref 4.2–5.8)
RBC # BLD: 4.19 M/UL — LOW (ref 4.2–5.8)
RBC # FLD: 14.4 % — SIGNIFICANT CHANGE UP (ref 10.3–16.9)
RBC # FLD: 14.4 % — SIGNIFICANT CHANGE UP (ref 10.3–16.9)
SODIUM SERPL-SCNC: 144 MMOL/L — SIGNIFICANT CHANGE UP (ref 135–145)
WBC # BLD: 16.4 K/UL — HIGH (ref 3.8–10.5)
WBC # BLD: 17 K/UL — HIGH (ref 3.8–10.5)
WBC # FLD AUTO: 16.4 K/UL — HIGH (ref 3.8–10.5)
WBC # FLD AUTO: 17 K/UL — HIGH (ref 3.8–10.5)

## 2017-12-06 PROCEDURE — 84100 ASSAY OF PHOSPHORUS: CPT

## 2017-12-06 PROCEDURE — 85027 COMPLETE CBC AUTOMATED: CPT

## 2017-12-06 PROCEDURE — 36415 COLL VENOUS BLD VENIPUNCTURE: CPT

## 2017-12-06 PROCEDURE — G0378: CPT

## 2017-12-06 PROCEDURE — 83735 ASSAY OF MAGNESIUM: CPT

## 2017-12-06 PROCEDURE — 80048 BASIC METABOLIC PNL TOTAL CA: CPT

## 2017-12-06 PROCEDURE — 93010 ELECTROCARDIOGRAM REPORT: CPT

## 2017-12-06 PROCEDURE — 97161 PT EVAL LOW COMPLEX 20 MIN: CPT

## 2017-12-06 PROCEDURE — 93005 ELECTROCARDIOGRAM TRACING: CPT

## 2017-12-06 RX ADMIN — Medication 500 MILLIGRAM(S): at 06:34

## 2017-12-06 RX ADMIN — Medication 100 MILLIGRAM(S): at 06:34

## 2017-12-06 NOTE — PHYSICAL THERAPY INITIAL EVALUATION ADULT - PERTINENT HX OF CURRENT PROBLEM, REHAB EVAL
81 yo male s/p Revision urinary sphincter today at [facility]. Patient was not able to stand on his own and felt weak to stay up by himself without assistance. Patient admitted for observation.

## 2017-12-06 NOTE — DISCHARGE NOTE ADULT - CARE PROVIDER_API CALL
Dandre Camacho), Urology  [address] [place] [address]  Phone: [phone]  Fax: [phone]

## 2017-12-06 NOTE — DISCHARGE NOTE ADULT - PATIENT PORTAL LINK FT
“You can access the [facility] Patient Portal, offered by [facility], by registering with the following website: [email or web address]”

## 2017-12-06 NOTE — PHYSICAL THERAPY INITIAL EVALUATION ADULT - ASR EQUIP NEEDS DISCH PT EVAL
Rolling walker ordered with Rui from [facility] surgical

## 2017-12-06 NOTE — PROGRESS NOTE ADULT - SUBJECTIVE AND OBJECTIVE BOX
INTERVAL HPI/OVERNIGHT EVENTS:  Admitted overnight s/p revision urinary sphincter for weakness LE's. Patient feels better.     VITALS:    T(F): 97.5 (12-06-17 @ 00:40), Max: 97.9 (12-05-17 @ 21:57)  HR: 71 (12-06-17 @ 00:40) (71 - 72)  BP: 97/58 (12-06-17 @ 00:40) (97/58 - 159/75)  RR: 16 (12-06-17 @ 00:40) (16 - 16)  SpO2: 96% (12-06-17 @ 00:40) (96% - 97%)  Wt(kg): --    I&O's Detail    05 Dec 2017 07:01  -  06 Dec 2017 05:43  --------------------------------------------------------  IN:    sodium chloride 0.9%.: 640 mL  Total IN: 640 mL    OUT:  Total OUT: 0 mL    Total NET: 640 mL          MEDICATIONS:    ANTIBIOTICS:  cephalexin 500 milliGRAM(s) Oral every 12 hours      PAIN CONTROL:  acetaminophen   Tablet. 650 milliGRAM(s) Oral every 6 hours PRN  HYDROmorphone   Tablet 2 milliGRAM(s) Oral every 4 hours PRN  HYDROmorphone   Tablet 4 milliGRAM(s) Oral every 4 hours PRN  ondansetron Injectable 4 milliGRAM(s) IV Push every 8 hours PRN       MEDS:      HEME/ONC        PHYSICAL EXAM:  General: No acute distress.  Alert and Oriented  Abdominal Exam: soft, NT, ND   Exam: FC intact, urine clear, incision site scrotum sl tender, no swelling      LABS:                        12.1   11.9  )-----------( 167      ( 05 Dec 2017 22:21 )             36.5     12-05    140  |  104  |  15  ----------------------------<  154<H>  4.3   |  26  |  0.95    Ca    9.7      05 Dec 2017 22:21  Phos  4.0     12-05  Mg     2.0     12-05            RADIOLOGY & ADDITIONAL TESTS:

## 2017-12-06 NOTE — DISCHARGE NOTE ADULT - CARE PLAN
Principal Discharge DX:	Status post implantation of artificial urinary sphincter  Goal:	improvement after surgery  Instructions for follow-up, activity and diet:	regular diet, activity as tolerated.  If fever >100.4 or any change or worsening of symptoms please call doctor or report to ED. Make follow up appointment with Dr Camacho. Please take only the antibiotic that was prescribed by Dr Camacho and discontinue the other antibiotics.  Secondary Diagnosis:	Weakness of both legs

## 2017-12-06 NOTE — DISCHARGE NOTE ADULT - HOSPITAL COURSE
82M hx prostate ca underwent revision urinary sphincter 12/5. Patient had weakness of his lower extremities postop. Pt seen by PT 12/6. VSS, afebrile and hemodynamically stable. 82M hx prostate ca underwent revision urinary sphincter 12/5. Patient had weakness of his lower extremities postop. Pt seen by PT 12/6, who cleared the patient for discharge with supervision by his family and a rolling walker. VSS, afebrile and hemodynamically stable.

## 2017-12-06 NOTE — DISCHARGE NOTE ADULT - MEDICATION SUMMARY - MEDICATIONS TO TAKE
I will START or STAY ON the medications listed below when I get home from the hospital:    acetaminophen-codeine 300 mg-15 mg oral tablet  -- 1 tab(s) by mouth every 6 hours MDD:4  -- Caution federal law prohibits the transfer of this drug to any person other  than the person for whom it was prescribed.  May cause drowsiness.  Alcohol may intensify this effect.  Use care when operating dangerous machinery.  This product contains acetaminophen.  Do not use  with any other product containing acetaminophen to prevent possible liver damage.  Using more of this medication than prescribed may cause serious breathing problems.    -- Indication: For for pain    Lupron Depot 22.5 mg/3 months intramuscular injection, extended release  --  intramuscular every 3 month, last dose 4/19/17  -- Indication: For home med    zolpidem 10 mg oral tablet  -- 0.5 tab(s) by mouth once a day (at bedtime), As Needed  -- Indication: For home med    amLODIPine 5 mg oral tablet  -- 1 tab(s) by mouth once a day  -- Indication: For home med    cephalexin 750 mg oral capsule  -- 1 cap(s) by mouth every 12 hours  -- Indication: For antibiotics    Colace sodium 100 mg oral capsule  -- 1 cap(s) by mouth 2 times a day  -- Medication should be taken with plenty of water.    -- Indication: For for constipation    Bactrim  mg-160 mg oral tablet  -- 1 tab(s) by mouth 2 times a day  -- Avoid prolonged or excessive exposure to direct and/or artificial sunlight while taking this medication.  Finish all this medication unless otherwise directed by prescriber.  Medication should be taken with plenty of water.    -- Indication: For antibiotics. Please take the one antibiotic that was sent to pharmacy    ciprofloxacin 500 mg oral tablet, extended release  -- 1 tab(s) by mouth every 24 hours  -- Avoid prolonged or excessive exposure to direct and/or artificial sunlight while taking this medication.  Check with your doctor before becoming pregnant.  Do not take dairy products, antacids, or iron preparations within one hour of this medication.  Finish all this medication unless otherwise directed by prescriber.  It is very important that you take or use this exactly as directed.  Do not skip doses or discontinue unless directed by your doctor.  Medication should be taken with plenty of water.  Obtain medical advice before taking any non-prescription drugs as some may affect the action of this medication.  Swallow whole.  Do not crush.  This drug may impair the ability to drive or operate machinery.  Use care until you become familiar with its effects.    -- Indication: For please only take the antibiotic prescribed    Vitamin D2 50,000 intl units (1.25 mg) oral capsule  -- 1 cap(s) by mouth once a week  -- Indication: For home med I will START or STAY ON the medications listed below when I get home from the hospital:    Rolling Walker  -- Indication: For Walker    acetaminophen-codeine 300 mg-15 mg oral tablet  -- 1 tab(s) by mouth every 6 hours MDD:4  -- Caution federal law prohibits the transfer of this drug to any person other  than the person for whom it was prescribed.  May cause drowsiness.  Alcohol may intensify this effect.  Use care when operating dangerous machinery.  This product contains acetaminophen.  Do not use  with any other product containing acetaminophen to prevent possible liver damage.  Using more of this medication than prescribed may cause serious breathing problems.    -- Indication: For for pain    Lupron Depot 22.5 mg/3 months intramuscular injection, extended release  --  intramuscular every 3 month, last dose 4/19/17  -- Indication: For home med    zolpidem 10 mg oral tablet  -- 0.5 tab(s) by mouth once a day (at bedtime), As Needed  -- Indication: For home med    amLODIPine 5 mg oral tablet  -- 1 tab(s) by mouth once a day  -- Indication: For home med    cephalexin 750 mg oral capsule  -- 1 cap(s) by mouth every 12 hours  -- Indication: For antibiotics    Colace sodium 100 mg oral capsule  -- 1 cap(s) by mouth 2 times a day  -- Medication should be taken with plenty of water.    -- Indication: For for constipation    Bactrim  mg-160 mg oral tablet  -- 1 tab(s) by mouth 2 times a day  -- Avoid prolonged or excessive exposure to direct and/or artificial sunlight while taking this medication.  Finish all this medication unless otherwise directed by prescriber.  Medication should be taken with plenty of water.    -- Indication: For antibiotics. Please take the one antibiotic that was sent to pharmacy    ciprofloxacin 500 mg oral tablet, extended release  -- 1 tab(s) by mouth every 24 hours  -- Avoid prolonged or excessive exposure to direct and/or artificial sunlight while taking this medication.  Check with your doctor before becoming pregnant.  Do not take dairy products, antacids, or iron preparations within one hour of this medication.  Finish all this medication unless otherwise directed by prescriber.  It is very important that you take or use this exactly as directed.  Do not skip doses or discontinue unless directed by your doctor.  Medication should be taken with plenty of water.  Obtain medical advice before taking any non-prescription drugs as some may affect the action of this medication.  Swallow whole.  Do not crush.  This drug may impair the ability to drive or operate machinery.  Use care until you become familiar with its effects.    -- Indication: For please only take the antibiotic prescribed    Vitamin D2 50,000 intl units (1.25 mg) oral capsule  -- 1 cap(s) by mouth once a week  -- Indication: For home med

## 2017-12-06 NOTE — DISCHARGE NOTE ADULT - PLAN OF CARE
improvement after surgery regular diet, activity as tolerated.  If fever >100.4 or any change or worsening of symptoms please call doctor or report to ED. Make follow up appointment with Dr Camacho. Please take only the antibiotic that was prescribed by Dr Camacho and discontinue the other antibiotics.

## 2017-12-06 NOTE — PHYSICAL THERAPY INITIAL EVALUATION ADULT - ADDITIONAL COMMENTS
Pt lives at home with spouse 3 [address]LE[address]  no steps inside. PTA: independent with all functional mobility, no AD. Denies fall. [place]

## 2017-12-06 NOTE — PHYSICAL THERAPY INITIAL EVALUATION ADULT - GENERAL OBSERVATIONS, REHAB EVAL
Rec'd pt supine in bed, cleared for PT and OOBa ctivity by Rn (Loretta). Denies pain; family present

## 2017-12-08 LAB — SURGICAL PATHOLOGY STUDY: SIGNIFICANT CHANGE UP

## 2017-12-12 DIAGNOSIS — Z85.46 PERSONAL HISTORY OF MALIGNANT NEOPLASM OF PROSTATE: ICD-10-CM

## 2017-12-12 DIAGNOSIS — I10 ESSENTIAL (PRIMARY) HYPERTENSION: ICD-10-CM

## 2017-12-12 DIAGNOSIS — R53.1 WEAKNESS: ICD-10-CM

## 2017-12-12 DIAGNOSIS — Z96.652 PRESENCE OF LEFT ARTIFICIAL KNEE JOINT: ICD-10-CM

## 2017-12-12 DIAGNOSIS — H91.90 UNSPECIFIED HEARING LOSS, UNSPECIFIED EAR: ICD-10-CM

## 2017-12-12 DIAGNOSIS — Z88.0 ALLERGY STATUS TO PENICILLIN: ICD-10-CM

## 2017-12-12 DIAGNOSIS — Z91.81 HISTORY OF FALLING: ICD-10-CM

## 2017-12-12 DIAGNOSIS — Z96.0 PRESENCE OF UROGENITAL IMPLANTS: ICD-10-CM

## 2017-12-12 DIAGNOSIS — Z98.890 OTHER SPECIFIED POSTPROCEDURAL STATES: ICD-10-CM

## 2017-12-12 DIAGNOSIS — R42 DIZZINESS AND GIDDINESS: ICD-10-CM

## 2018-01-16 ENCOUNTER — RX RENEWAL (OUTPATIENT)
Age: 83
End: 2018-01-16

## 2018-02-07 ENCOUNTER — APPOINTMENT (OUTPATIENT)
Dept: UROLOGY | Facility: CLINIC | Age: 83
End: 2018-02-07
Payer: COMMERCIAL

## 2018-02-07 ENCOUNTER — OUTPATIENT (OUTPATIENT)
Dept: OUTPATIENT SERVICES | Facility: HOSPITAL | Age: 83
LOS: 1 days | End: 2018-02-07
Payer: COMMERCIAL

## 2018-02-07 DIAGNOSIS — Z98.89 OTHER SPECIFIED POSTPROCEDURAL STATES: Chronic | ICD-10-CM

## 2018-02-07 DIAGNOSIS — R35.0 FREQUENCY OF MICTURITION: ICD-10-CM

## 2018-02-07 DIAGNOSIS — Z96.652 PRESENCE OF LEFT ARTIFICIAL KNEE JOINT: Chronic | ICD-10-CM

## 2018-02-07 DIAGNOSIS — Z96.0 PRESENCE OF UROGENITAL IMPLANTS: Chronic | ICD-10-CM

## 2018-02-07 LAB
APPEARANCE: CLEAR
BACTERIA: NEGATIVE
BILIRUBIN URINE: NEGATIVE
BLOOD URINE: NEGATIVE
COLOR: ABNORMAL
GLUCOSE QUALITATIVE U: NEGATIVE MG/DL
HYALINE CASTS: 3 /LPF
KETONES URINE: ABNORMAL
LEUKOCYTE ESTERASE URINE: NEGATIVE
MICROSCOPIC-UA: NORMAL
NITRITE URINE: NEGATIVE
PH URINE: 5
PROTEIN URINE: NEGATIVE MG/DL
RED BLOOD CELLS URINE: 3 /HPF
SPECIFIC GRAVITY URINE: 1.03
SQUAMOUS EPITHELIAL CELLS: 1 /HPF
UROBILINOGEN URINE: 1 MG/DL
WHITE BLOOD CELLS URINE: 1 /HPF

## 2018-02-07 PROCEDURE — 99213 OFFICE O/P EST LOW 20 MIN: CPT

## 2018-02-07 PROCEDURE — 96402 CHEMO HORMON ANTINEOPL SQ/IM: CPT

## 2018-02-08 LAB
PSA FREE FLD-MCNC: 5.3
PSA FREE SERPL-MCNC: 0.07 NG/ML
PSA SERPL-MCNC: 1.33 NG/ML

## 2018-02-09 LAB — CORE LAB FLUID CYTOLOGY: NORMAL

## 2018-02-13 DIAGNOSIS — C61 MALIGNANT NEOPLASM OF PROSTATE: ICD-10-CM

## 2018-02-13 DIAGNOSIS — R97.20 ELEVATED PROSTATE SPECIFIC ANTIGEN [PSA]: ICD-10-CM

## 2018-05-09 ENCOUNTER — APPOINTMENT (OUTPATIENT)
Dept: UROLOGY | Facility: CLINIC | Age: 83
End: 2018-05-09
Payer: COMMERCIAL

## 2018-05-09 ENCOUNTER — MED ADMIN CHARGE (OUTPATIENT)
Age: 83
End: 2018-05-09

## 2018-05-09 ENCOUNTER — OUTPATIENT (OUTPATIENT)
Dept: OUTPATIENT SERVICES | Facility: HOSPITAL | Age: 83
LOS: 1 days | End: 2018-05-09
Payer: COMMERCIAL

## 2018-05-09 DIAGNOSIS — Z96.0 PRESENCE OF UROGENITAL IMPLANTS: Chronic | ICD-10-CM

## 2018-05-09 DIAGNOSIS — R35.0 FREQUENCY OF MICTURITION: ICD-10-CM

## 2018-05-09 DIAGNOSIS — Z98.89 OTHER SPECIFIED POSTPROCEDURAL STATES: Chronic | ICD-10-CM

## 2018-05-09 DIAGNOSIS — Z96.652 PRESENCE OF LEFT ARTIFICIAL KNEE JOINT: Chronic | ICD-10-CM

## 2018-05-09 DIAGNOSIS — Z00.00 ENCOUNTER FOR GENERAL ADULT MEDICAL EXAMINATION W/OUT ABNORMAL FINDINGS: ICD-10-CM

## 2018-05-09 PROCEDURE — 96402 CHEMO HORMON ANTINEOPL SQ/IM: CPT

## 2018-05-09 PROCEDURE — 99213 OFFICE O/P EST LOW 20 MIN: CPT

## 2018-05-10 LAB
APPEARANCE: CLEAR
BACTERIA: NEGATIVE
BILIRUBIN URINE: NEGATIVE
BLOOD URINE: NEGATIVE
COLOR: YELLOW
GLUCOSE QUALITATIVE U: NEGATIVE MG/DL
HYALINE CASTS: 2 /LPF
KETONES URINE: NEGATIVE
LEUKOCYTE ESTERASE URINE: NEGATIVE
MICROSCOPIC-UA: NORMAL
NITRITE URINE: NEGATIVE
PH URINE: 5.5
PROTEIN URINE: NEGATIVE MG/DL
PSA FREE FLD-MCNC: 5.6
PSA FREE SERPL-MCNC: 0.1 NG/ML
PSA SERPL-MCNC: 1.79 NG/ML
RED BLOOD CELLS URINE: 2 /HPF
SPECIFIC GRAVITY URINE: 1.02
SQUAMOUS EPITHELIAL CELLS: 1 /HPF
UROBILINOGEN URINE: NEGATIVE MG/DL
WHITE BLOOD CELLS URINE: 1 /HPF

## 2018-05-11 DIAGNOSIS — C61 MALIGNANT NEOPLASM OF PROSTATE: ICD-10-CM

## 2018-07-16 PROBLEM — R42 DIZZINESS AND GIDDINESS: Chronic | Status: ACTIVE | Noted: 2017-03-01
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
## 2018-07-16 PROBLEM — H91.90 UNSPECIFIED HEARING LOSS, UNSPECIFIED EAR: Chronic | Status: ACTIVE | Noted: 2017-03-01

## 2018-08-02 ENCOUNTER — APPOINTMENT (OUTPATIENT)
Dept: UROLOGY | Facility: CLINIC | Age: 83
End: 2018-08-02

## 2018-08-06 PROBLEM — I10 ESSENTIAL (PRIMARY) HYPERTENSION: Chronic | Status: ACTIVE | Noted: 2017-04-21

## 2018-08-08 ENCOUNTER — OUTPATIENT (OUTPATIENT)
Dept: OUTPATIENT SERVICES | Facility: HOSPITAL | Age: 83
LOS: 1 days | End: 2018-08-08
Payer: COMMERCIAL

## 2018-08-08 ENCOUNTER — APPOINTMENT (OUTPATIENT)
Dept: UROLOGY | Facility: CLINIC | Age: 83
End: 2018-08-08
Payer: COMMERCIAL

## 2018-08-08 DIAGNOSIS — Z96.652 PRESENCE OF LEFT ARTIFICIAL KNEE JOINT: Chronic | ICD-10-CM

## 2018-08-08 DIAGNOSIS — R35.0 FREQUENCY OF MICTURITION: ICD-10-CM

## 2018-08-08 DIAGNOSIS — Z96.0 PRESENCE OF UROGENITAL IMPLANTS: Chronic | ICD-10-CM

## 2018-08-08 DIAGNOSIS — Z98.89 OTHER SPECIFIED POSTPROCEDURAL STATES: Chronic | ICD-10-CM

## 2018-08-08 PROCEDURE — 99213 OFFICE O/P EST LOW 20 MIN: CPT

## 2018-08-08 PROCEDURE — 96402 CHEMO HORMON ANTINEOPL SQ/IM: CPT

## 2018-08-10 DIAGNOSIS — C61 MALIGNANT NEOPLASM OF PROSTATE: ICD-10-CM

## 2018-08-10 DIAGNOSIS — R97.20 ELEVATED PROSTATE SPECIFIC ANTIGEN [PSA]: ICD-10-CM

## 2018-08-10 LAB
PSA FREE FLD-MCNC: 5.6
PSA FREE SERPL-MCNC: 0.08 NG/ML
PSA SERPL-MCNC: 1.42 NG/ML

## 2018-11-14 ENCOUNTER — APPOINTMENT (OUTPATIENT)
Dept: UROLOGY | Facility: CLINIC | Age: 83
End: 2018-11-14
Payer: COMMERCIAL

## 2018-11-14 PROCEDURE — 99213 OFFICE O/P EST LOW 20 MIN: CPT

## 2018-11-15 LAB
APPEARANCE: CLEAR
BACTERIA: NEGATIVE
BILIRUBIN URINE: NEGATIVE
BLOOD URINE: NEGATIVE
COLOR: YELLOW
GLUCOSE QUALITATIVE U: NEGATIVE MG/DL
HYALINE CASTS: 6 /LPF
KETONES URINE: NEGATIVE
LEUKOCYTE ESTERASE URINE: NEGATIVE
MICROSCOPIC-UA: NORMAL
NITRITE URINE: NEGATIVE
PH URINE: 5
PROTEIN URINE: NEGATIVE MG/DL
PSA FREE FLD-MCNC: 7.7
PSA FREE SERPL-MCNC: 0.09 NG/ML
PSA SERPL-MCNC: 1.17 NG/ML
RED BLOOD CELLS URINE: 2 /HPF
SPECIFIC GRAVITY URINE: 1.03
SQUAMOUS EPITHELIAL CELLS: 3 /HPF
UROBILINOGEN URINE: NEGATIVE MG/DL
WHITE BLOOD CELLS URINE: 3 /HPF

## 2018-11-16 LAB — CORE LAB FLUID CYTOLOGY: NORMAL

## 2019-01-04 ENCOUNTER — RX RENEWAL (OUTPATIENT)
Age: 84
End: 2019-01-04

## 2019-02-13 ENCOUNTER — APPOINTMENT (OUTPATIENT)
Dept: UROLOGY | Facility: CLINIC | Age: 84
End: 2019-02-13
Payer: MEDICARE

## 2019-02-13 PROCEDURE — 99213 OFFICE O/P EST LOW 20 MIN: CPT

## 2019-02-14 LAB
APPEARANCE: ABNORMAL
BACTERIA: NEGATIVE
BILIRUBIN URINE: ABNORMAL
BLOOD URINE: NEGATIVE
CALCIUM OXALATE CRYSTALS: ABNORMAL
COLOR: ABNORMAL
GLUCOSE QUALITATIVE U: NEGATIVE MG/DL
HYALINE CASTS: 0 /LPF
KETONES URINE: ABNORMAL
LEUKOCYTE ESTERASE URINE: NEGATIVE
MICROSCOPIC-UA: NORMAL
NITRITE URINE: NEGATIVE
PH URINE: 5
PROTEIN URINE: NEGATIVE MG/DL
PSA FREE FLD-MCNC: 8 %
PSA FREE SERPL-MCNC: 0.12 NG/ML
PSA SERPL-MCNC: 1.41 NG/ML
RED BLOOD CELLS URINE: 0 /HPF
SPECIFIC GRAVITY URINE: 1.03
SQUAMOUS EPITHELIAL CELLS: 2 /HPF
UROBILINOGEN URINE: 1 MG/DL
WHITE BLOOD CELLS URINE: 2 /HPF

## 2019-05-15 ENCOUNTER — APPOINTMENT (OUTPATIENT)
Dept: UROLOGY | Facility: CLINIC | Age: 84
End: 2019-05-15
Payer: MEDICARE

## 2019-05-15 ENCOUNTER — OUTPATIENT (OUTPATIENT)
Dept: OUTPATIENT SERVICES | Facility: HOSPITAL | Age: 84
LOS: 1 days | End: 2019-05-15
Payer: COMMERCIAL

## 2019-05-15 DIAGNOSIS — Z96.0 PRESENCE OF UROGENITAL IMPLANTS: Chronic | ICD-10-CM

## 2019-05-15 DIAGNOSIS — N52.9 MALE ERECTILE DYSFUNCTION, UNSPECIFIED: ICD-10-CM

## 2019-05-15 DIAGNOSIS — Z96.652 PRESENCE OF LEFT ARTIFICIAL KNEE JOINT: Chronic | ICD-10-CM

## 2019-05-15 DIAGNOSIS — Z98.89 OTHER SPECIFIED POSTPROCEDURAL STATES: Chronic | ICD-10-CM

## 2019-05-15 DIAGNOSIS — R35.0 FREQUENCY OF MICTURITION: ICD-10-CM

## 2019-05-15 PROCEDURE — 96402 CHEMO HORMON ANTINEOPL SQ/IM: CPT

## 2019-05-15 PROCEDURE — 99213 OFFICE O/P EST LOW 20 MIN: CPT | Mod: 25

## 2019-05-15 NOTE — PHYSICAL EXAM
[General Appearance - Well Developed] : well developed [General Appearance - Well Nourished] : well nourished [Normal Appearance] : normal appearance [Well Groomed] : well groomed [Abdomen Soft] : soft [General Appearance - In No Acute Distress] : no acute distress [Abdomen Tenderness] : non-tender [Costovertebral Angle Tenderness] : no ~M costovertebral angle tenderness [Urinary Bladder Findings] : the bladder was normal on palpation [Urethral Meatus] : meatus normal [Scrotum] : the scrotum was normal [Testes Mass (___cm)] : there were no testicular masses [No Prostate Nodules] : no prostate nodules [Edema] : no peripheral edema [] : no respiratory distress [Respiration, Rhythm And Depth] : normal respiratory rhythm and effort [Exaggerated Use Of Accessory Muscles For Inspiration] : no accessory muscle use [Oriented To Time, Place, And Person] : oriented to person, place, and time [Affect] : the affect was normal [Not Anxious] : not anxious [Mood] : the mood was normal [Normal Station and Gait] : the gait and station were normal for the patient's age [No Focal Deficits] : no focal deficits [No Palpable Adenopathy] : no palpable adenopathy

## 2019-05-16 LAB
APPEARANCE: ABNORMAL
BACTERIA: ABNORMAL
BILIRUBIN URINE: NEGATIVE
BLOOD URINE: ABNORMAL
COLOR: YELLOW
GLUCOSE QUALITATIVE U: NEGATIVE
HYALINE CASTS: 0 /LPF
KETONES URINE: ABNORMAL
LEUKOCYTE ESTERASE URINE: ABNORMAL
MICROSCOPIC-UA: NORMAL
NITRITE URINE: NEGATIVE
PH URINE: 6
PROTEIN URINE: ABNORMAL
PSA FREE FLD-MCNC: 7 %
PSA FREE SERPL-MCNC: 0.09 NG/ML
PSA SERPL-MCNC: 1.34 NG/ML
RED BLOOD CELLS URINE: 20 /HPF
SPECIFIC GRAVITY URINE: 1.03
SQUAMOUS EPITHELIAL CELLS: 0 /HPF
UROBILINOGEN URINE: NORMAL
WHITE BLOOD CELLS URINE: 50 /HPF

## 2019-05-21 DIAGNOSIS — N52.9 MALE ERECTILE DYSFUNCTION, UNSPECIFIED: ICD-10-CM

## 2019-05-21 DIAGNOSIS — C61 MALIGNANT NEOPLASM OF PROSTATE: ICD-10-CM

## 2019-07-17 ENCOUNTER — RX RENEWAL (OUTPATIENT)
Age: 84
End: 2019-07-17

## 2019-08-14 ENCOUNTER — OUTPATIENT (OUTPATIENT)
Dept: OUTPATIENT SERVICES | Facility: HOSPITAL | Age: 84
LOS: 1 days | End: 2019-08-14
Payer: COMMERCIAL

## 2019-08-14 ENCOUNTER — APPOINTMENT (OUTPATIENT)
Dept: UROLOGY | Facility: CLINIC | Age: 84
End: 2019-08-14
Payer: MEDICARE

## 2019-08-14 DIAGNOSIS — R35.0 FREQUENCY OF MICTURITION: ICD-10-CM

## 2019-08-14 DIAGNOSIS — Z98.89 OTHER SPECIFIED POSTPROCEDURAL STATES: Chronic | ICD-10-CM

## 2019-08-14 DIAGNOSIS — Z96.0 PRESENCE OF UROGENITAL IMPLANTS: Chronic | ICD-10-CM

## 2019-08-14 DIAGNOSIS — Z96.652 PRESENCE OF LEFT ARTIFICIAL KNEE JOINT: Chronic | ICD-10-CM

## 2019-08-14 PROCEDURE — 99213 OFFICE O/P EST LOW 20 MIN: CPT

## 2019-08-14 PROCEDURE — 96402 CHEMO HORMON ANTINEOPL SQ/IM: CPT

## 2019-08-15 LAB
PSA FREE FLD-MCNC: 6 %
PSA FREE SERPL-MCNC: 0.08 NG/ML
PSA SERPL-MCNC: 1.31 NG/ML

## 2019-08-23 DIAGNOSIS — C61 MALIGNANT NEOPLASM OF PROSTATE: ICD-10-CM

## 2019-11-08 NOTE — ED ADULT NURSE NOTE - PRIMARY CARE PROVIDER
Admission Medication Reconciliation:    Information obtained from:  Patient and mother  Ross Marte:  NO    Comments/Recommendations: Spoke with mother and patient regarding use of PTA medications including prescription/OTC, vitamins/supplements, inhaled, topical, nasal, otic and ophthalmic medications. Updated PTA meds/reviewed patient's allergies. 1)  Patient only takes Concerta Mon-Fri before school. Denies use of any other medications. 2)  Medication changes (since last review): Added  - None    Adjusted  - None    Removed  - None     ¹[employer] pharmacy benefit data reflects medications filled and processed through the patient's insurance, however   this data does NOT capture whether the medication was picked up or is currently being taken by the patient. Allergies:  Patient has no known allergies. Significant PMH/Disease States: History reviewed. No pertinent past medical history. Chief Complaint for this Admission:    Chief Complaint   Patient presents with    Abdominal Pain     Prior to Admission Medications:   Prior to Admission Medications   Prescriptions Last Dose Informant Patient Reported? Taking? methylphenidate ER 36 mg 24 hr tab 11/6/2019 at 0630  Yes Yes   Sig: Take 36 mg by mouth every morning. Takes daily Mon-Fri before school      Facility-Administered Medications: None       Please contact the main inpatient pharmacy with any questions or concerns at [phone] and we will direct you to the clinical pharmacist covering this patient's care while in-house.    COLBY Cruz
Doing well, tiffanie 
unknown

## 2019-11-14 ENCOUNTER — APPOINTMENT (OUTPATIENT)
Dept: UROLOGY | Facility: CLINIC | Age: 84
End: 2019-11-14
Payer: MEDICARE

## 2019-11-14 ENCOUNTER — OUTPATIENT (OUTPATIENT)
Dept: OUTPATIENT SERVICES | Facility: HOSPITAL | Age: 84
LOS: 1 days | End: 2019-11-14
Payer: MEDICARE

## 2019-11-14 DIAGNOSIS — Z96.0 PRESENCE OF UROGENITAL IMPLANTS: Chronic | ICD-10-CM

## 2019-11-14 DIAGNOSIS — Z96.652 PRESENCE OF LEFT ARTIFICIAL KNEE JOINT: Chronic | ICD-10-CM

## 2019-11-14 DIAGNOSIS — R35.0 FREQUENCY OF MICTURITION: ICD-10-CM

## 2019-11-14 DIAGNOSIS — Z98.89 OTHER SPECIFIED POSTPROCEDURAL STATES: Chronic | ICD-10-CM

## 2019-11-14 PROCEDURE — 99213 OFFICE O/P EST LOW 20 MIN: CPT

## 2019-11-14 PROCEDURE — 96402 CHEMO HORMON ANTINEOPL SQ/IM: CPT

## 2019-11-14 NOTE — PHYSICAL EXAM
[General Appearance - Well Developed] : well developed [General Appearance - Well Nourished] : well nourished [Normal Appearance] : normal appearance [Well Groomed] : well groomed [Abdomen Tenderness] : non-tender [General Appearance - In No Acute Distress] : no acute distress [Abdomen Soft] : soft [Costovertebral Angle Tenderness] : no ~M costovertebral angle tenderness [Urinary Bladder Findings] : the bladder was normal on palpation [Urethral Meatus] : meatus normal [Testes Mass (___cm)] : there were no testicular masses [Scrotum] : the scrotum was normal [No Prostate Nodules] : no prostate nodules [Edema] : no peripheral edema [] : no respiratory distress [Respiration, Rhythm And Depth] : normal respiratory rhythm and effort [Exaggerated Use Of Accessory Muscles For Inspiration] : no accessory muscle use [Oriented To Time, Place, And Person] : oriented to person, place, and time [Affect] : the affect was normal [Not Anxious] : not anxious [Mood] : the mood was normal [No Focal Deficits] : no focal deficits [Normal Station and Gait] : the gait and station were normal for the patient's age [No Palpable Adenopathy] : no palpable adenopathy

## 2019-11-21 DIAGNOSIS — R97.20 ELEVATED PROSTATE SPECIFIC ANTIGEN [PSA]: ICD-10-CM

## 2019-11-21 DIAGNOSIS — C61 MALIGNANT NEOPLASM OF PROSTATE: ICD-10-CM

## 2020-02-13 ENCOUNTER — OUTPATIENT (OUTPATIENT)
Dept: OUTPATIENT SERVICES | Facility: HOSPITAL | Age: 85
LOS: 1 days | End: 2020-02-13
Payer: COMMERCIAL

## 2020-02-13 ENCOUNTER — APPOINTMENT (OUTPATIENT)
Dept: UROLOGY | Facility: CLINIC | Age: 85
End: 2020-02-13
Payer: MEDICARE

## 2020-02-13 DIAGNOSIS — Z96.652 PRESENCE OF LEFT ARTIFICIAL KNEE JOINT: Chronic | ICD-10-CM

## 2020-02-13 DIAGNOSIS — Z98.89 OTHER SPECIFIED POSTPROCEDURAL STATES: Chronic | ICD-10-CM

## 2020-02-13 DIAGNOSIS — R35.0 FREQUENCY OF MICTURITION: ICD-10-CM

## 2020-02-13 DIAGNOSIS — Z96.0 PRESENCE OF UROGENITAL IMPLANTS: Chronic | ICD-10-CM

## 2020-02-13 PROCEDURE — 99213 OFFICE O/P EST LOW 20 MIN: CPT | Mod: 25

## 2020-02-13 PROCEDURE — 96402 CHEMO HORMON ANTINEOPL SQ/IM: CPT

## 2020-02-13 RX ORDER — LEUPROLIDE ACETATE 22.5 MG
22.5 KIT INTRAMUSCULAR
Qty: 1 | Refills: 0 | Status: COMPLETED | OUTPATIENT
Start: 2020-02-13

## 2020-02-13 RX ADMIN — LEUPROLIDE ACETATE 0 MG: KIT at 00:00

## 2020-02-13 NOTE — PHYSICAL EXAM
[General Appearance - Well Developed] : well developed [General Appearance - Well Nourished] : well nourished [Normal Appearance] : normal appearance [Well Groomed] : well groomed [General Appearance - In No Acute Distress] : no acute distress [Abdomen Soft] : soft [Costovertebral Angle Tenderness] : no ~M costovertebral angle tenderness [Urethral Meatus] : meatus normal [Abdomen Tenderness] : non-tender [Scrotum] : the scrotum was normal [Urinary Bladder Findings] : the bladder was normal on palpation [Testes Mass (___cm)] : there were no testicular masses [No Prostate Nodules] : no prostate nodules [] : no respiratory distress [Edema] : no peripheral edema [Respiration, Rhythm And Depth] : normal respiratory rhythm and effort [Exaggerated Use Of Accessory Muscles For Inspiration] : no accessory muscle use [Oriented To Time, Place, And Person] : oriented to person, place, and time [Affect] : the affect was normal [Mood] : the mood was normal [Not Anxious] : not anxious [Normal Station and Gait] : the gait and station were normal for the patient's age [No Palpable Adenopathy] : no palpable adenopathy [No Focal Deficits] : no focal deficits

## 2020-02-14 LAB
APPEARANCE: ABNORMAL
BACTERIA: ABNORMAL
BILIRUBIN URINE: NEGATIVE
BLOOD URINE: NORMAL
COLOR: YELLOW
GLUCOSE QUALITATIVE U: NEGATIVE
HYALINE CASTS: 0 /LPF
KETONES URINE: NEGATIVE
LEUKOCYTE ESTERASE URINE: ABNORMAL
MICROSCOPIC-UA: NORMAL
NITRITE URINE: NEGATIVE
PH URINE: 5.5
PROTEIN URINE: ABNORMAL
PSA FREE FLD-MCNC: 7 %
PSA FREE SERPL-MCNC: 0.15 NG/ML
PSA SERPL-MCNC: 2.12 NG/ML
RED BLOOD CELLS URINE: 1 /HPF
SPECIFIC GRAVITY URINE: 1.02
SQUAMOUS EPITHELIAL CELLS: 0 /HPF
UROBILINOGEN URINE: NORMAL
WHITE BLOOD CELLS URINE: 79 /HPF

## 2020-02-27 DIAGNOSIS — C61 MALIGNANT NEOPLASM OF PROSTATE: ICD-10-CM

## 2020-05-13 ENCOUNTER — APPOINTMENT (OUTPATIENT)
Dept: UROLOGY | Facility: CLINIC | Age: 85
End: 2020-05-13
Payer: MEDICARE

## 2020-05-13 ENCOUNTER — OUTPATIENT (OUTPATIENT)
Dept: OUTPATIENT SERVICES | Facility: HOSPITAL | Age: 85
LOS: 1 days | End: 2020-05-13
Payer: MEDICARE

## 2020-05-13 VITALS — HEART RATE: 77 BPM | DIASTOLIC BLOOD PRESSURE: 71 MMHG | RESPIRATION RATE: 17 BRPM | SYSTOLIC BLOOD PRESSURE: 144 MMHG

## 2020-05-13 DIAGNOSIS — Z96.0 PRESENCE OF UROGENITAL IMPLANTS: Chronic | ICD-10-CM

## 2020-05-13 DIAGNOSIS — Z96.652 PRESENCE OF LEFT ARTIFICIAL KNEE JOINT: Chronic | ICD-10-CM

## 2020-05-13 DIAGNOSIS — R35.0 FREQUENCY OF MICTURITION: ICD-10-CM

## 2020-05-13 DIAGNOSIS — Z98.89 OTHER SPECIFIED POSTPROCEDURAL STATES: Chronic | ICD-10-CM

## 2020-05-13 PROCEDURE — 99213 OFFICE O/P EST LOW 20 MIN: CPT

## 2020-05-13 PROCEDURE — 96402 CHEMO HORMON ANTINEOPL SQ/IM: CPT

## 2020-05-13 NOTE — PHYSICAL EXAM
[General Appearance - Well Developed] : well developed [General Appearance - Well Nourished] : well nourished [Normal Appearance] : normal appearance [Well Groomed] : well groomed [General Appearance - In No Acute Distress] : no acute distress [Abdomen Soft] : soft [Abdomen Tenderness] : non-tender [Urethral Meatus] : meatus normal [Costovertebral Angle Tenderness] : no ~M costovertebral angle tenderness [Urinary Bladder Findings] : the bladder was normal on palpation [Scrotum] : the scrotum was normal [Testes Mass (___cm)] : there were no testicular masses [No Prostate Nodules] : no prostate nodules [Edema] : no peripheral edema [] : no respiratory distress [Respiration, Rhythm And Depth] : normal respiratory rhythm and effort [Exaggerated Use Of Accessory Muscles For Inspiration] : no accessory muscle use [Oriented To Time, Place, And Person] : oriented to person, place, and time [Affect] : the affect was normal [Mood] : the mood was normal [Not Anxious] : not anxious [Normal Station and Gait] : the gait and station were normal for the patient's age [No Focal Deficits] : no focal deficits [No Palpable Adenopathy] : no palpable adenopathy

## 2020-05-14 DIAGNOSIS — C61 MALIGNANT NEOPLASM OF PROSTATE: ICD-10-CM

## 2020-05-14 LAB
PSA FREE FLD-MCNC: 7 %
PSA FREE SERPL-MCNC: 0.15 NG/ML
PSA SERPL-MCNC: 2.14 NG/ML

## 2020-08-13 ENCOUNTER — APPOINTMENT (OUTPATIENT)
Dept: UROLOGY | Facility: CLINIC | Age: 85
End: 2020-08-13
Payer: MEDICARE

## 2020-08-13 ENCOUNTER — OUTPATIENT (OUTPATIENT)
Dept: OUTPATIENT SERVICES | Facility: HOSPITAL | Age: 85
LOS: 1 days | End: 2020-08-13
Payer: MEDICARE

## 2020-08-13 VITALS — TEMPERATURE: 97.3 F

## 2020-08-13 DIAGNOSIS — Z96.0 PRESENCE OF UROGENITAL IMPLANTS: Chronic | ICD-10-CM

## 2020-08-13 DIAGNOSIS — R35.0 FREQUENCY OF MICTURITION: ICD-10-CM

## 2020-08-13 DIAGNOSIS — Z96.652 PRESENCE OF LEFT ARTIFICIAL KNEE JOINT: Chronic | ICD-10-CM

## 2020-08-13 DIAGNOSIS — Z98.89 OTHER SPECIFIED POSTPROCEDURAL STATES: Chronic | ICD-10-CM

## 2020-08-13 PROCEDURE — 99213 OFFICE O/P EST LOW 20 MIN: CPT

## 2020-08-13 PROCEDURE — 96402 CHEMO HORMON ANTINEOPL SQ/IM: CPT

## 2020-08-13 NOTE — PHYSICAL EXAM
[General Appearance - Well Developed] : well developed [General Appearance - Well Nourished] : well nourished [Normal Appearance] : normal appearance [Well Groomed] : well groomed [General Appearance - In No Acute Distress] : no acute distress [Abdomen Soft] : soft [Costovertebral Angle Tenderness] : no ~M costovertebral angle tenderness [Abdomen Tenderness] : non-tender [Urinary Bladder Findings] : the bladder was normal on palpation [Urethral Meatus] : meatus normal [Testes Mass (___cm)] : there were no testicular masses [Scrotum] : the scrotum was normal [No Prostate Nodules] : no prostate nodules [Edema] : no peripheral edema [] : no respiratory distress [Respiration, Rhythm And Depth] : normal respiratory rhythm and effort [Exaggerated Use Of Accessory Muscles For Inspiration] : no accessory muscle use [Oriented To Time, Place, And Person] : oriented to person, place, and time [Affect] : the affect was normal [Mood] : the mood was normal [Normal Station and Gait] : the gait and station were normal for the patient's age [Not Anxious] : not anxious [No Focal Deficits] : no focal deficits [No Palpable Adenopathy] : no palpable adenopathy

## 2020-08-14 LAB
PSA FREE FLD-MCNC: 8 %
PSA FREE SERPL-MCNC: 0.15 NG/ML
PSA SERPL-MCNC: 1.79 NG/ML

## 2020-08-28 DIAGNOSIS — C61 MALIGNANT NEOPLASM OF PROSTATE: ICD-10-CM

## 2020-11-12 ENCOUNTER — APPOINTMENT (OUTPATIENT)
Dept: UROLOGY | Facility: CLINIC | Age: 85
End: 2020-11-12
Payer: MEDICARE

## 2020-11-12 ENCOUNTER — OUTPATIENT (OUTPATIENT)
Dept: OUTPATIENT SERVICES | Facility: HOSPITAL | Age: 85
LOS: 1 days | End: 2020-11-12
Payer: MEDICARE

## 2020-11-12 VITALS — TEMPERATURE: 97.4 F

## 2020-11-12 DIAGNOSIS — Z96.652 PRESENCE OF LEFT ARTIFICIAL KNEE JOINT: Chronic | ICD-10-CM

## 2020-11-12 DIAGNOSIS — Z98.89 OTHER SPECIFIED POSTPROCEDURAL STATES: Chronic | ICD-10-CM

## 2020-11-12 DIAGNOSIS — R35.0 FREQUENCY OF MICTURITION: ICD-10-CM

## 2020-11-12 DIAGNOSIS — Z96.0 PRESENCE OF UROGENITAL IMPLANTS: Chronic | ICD-10-CM

## 2020-11-12 DIAGNOSIS — C61 MALIGNANT NEOPLASM OF PROSTATE: ICD-10-CM

## 2020-11-12 PROCEDURE — 96402 CHEMO HORMON ANTINEOPL SQ/IM: CPT

## 2020-11-12 PROCEDURE — 99213 OFFICE O/P EST LOW 20 MIN: CPT

## 2020-11-12 RX ORDER — LEUPROLIDE ACETATE 30 MG/.5ML
30 INJECTION, SUSPENSION, EXTENDED RELEASE SUBCUTANEOUS
Refills: 0 | Status: COMPLETED | OUTPATIENT
Start: 2020-11-12

## 2020-11-12 RX ADMIN — LEUPROLIDE ACETATE 0 MG: KIT SUBCUTANEOUS at 00:00

## 2020-11-13 LAB
APPEARANCE: ABNORMAL
BACTERIA: ABNORMAL
BILIRUBIN URINE: NEGATIVE
BLOOD URINE: NORMAL
COLOR: YELLOW
GLUCOSE QUALITATIVE U: NEGATIVE
HYALINE CASTS: 0 /LPF
KETONES URINE: NEGATIVE
LEUKOCYTE ESTERASE URINE: ABNORMAL
MICROSCOPIC-UA: NORMAL
NITRITE URINE: NEGATIVE
PH URINE: 5.5
PROTEIN URINE: ABNORMAL
PSA FREE FLD-MCNC: 8 %
PSA FREE SERPL-MCNC: 0.15 NG/ML
PSA SERPL-MCNC: 2.01 NG/ML
RED BLOOD CELLS URINE: 14 /HPF
SPECIFIC GRAVITY URINE: 1.02
SQUAMOUS EPITHELIAL CELLS: 1 /HPF
UROBILINOGEN URINE: NORMAL
WHITE BLOOD CELLS URINE: 91 /HPF

## 2020-11-30 NOTE — ASU DISCHARGE PLAN (ADULT/PEDIATRIC). - NS DC INTERPRETER YES NO
Patient was discharged home with no needs.      11/30/20 1157   Final Note   Assessment Type Final Discharge Note   Anticipated Discharge Disposition Home   Hospital Follow Up  Appt(s) scheduled? Yes   Discharge plans and expectations educations in teach back method with documentation complete? Yes   Right Care Referral Info   Post Acute Recommendation Home-care   Post-Acute Status   Post-Acute Authorization Other   Other Status No Post-Acute Service Needs     Future Appointments   Date Time Provider Department Center   12/8/2020  2:00 PM Michael Pinzon MD [facility] HNSO [facility]   12/11/2020 11:00 AM Catarino Mota MD [facility] UROLOGY [facility]   12/15/2020  9:40 AM CED Haley MD [facility] COLSURG [facility]   12/21/2020  8:00 AM LAB, SBP SBP LAB [facility]   12/22/2020  9:30 AM Sonja Fenton MD [facility] RHEUM [facility]   12/30/2020  8:30 AM Alexandrea Purdy NP SBPCO DIMGNT [facility]   3/23/2021  1:40 PM CED Haley MD [facility] COLON [facility]     Eva Bland RN, CM   Ext: [phone]     No

## 2020-12-15 PROBLEM — N39.0 URINARY TRACT INFECTION, E. COLI: Status: RESOLVED | Noted: 2017-03-06 | Resolved: 2020-12-15

## 2020-12-21 ENCOUNTER — RX RENEWAL (OUTPATIENT)
Age: 85
End: 2020-12-21

## 2021-03-11 ENCOUNTER — APPOINTMENT (OUTPATIENT)
Dept: UROLOGY | Facility: CLINIC | Age: 86
End: 2021-03-11
Payer: MEDICARE

## 2021-03-11 ENCOUNTER — OUTPATIENT (OUTPATIENT)
Dept: OUTPATIENT SERVICES | Facility: HOSPITAL | Age: 86
LOS: 1 days | End: 2021-03-11
Payer: MEDICARE

## 2021-03-11 DIAGNOSIS — R35.0 FREQUENCY OF MICTURITION: ICD-10-CM

## 2021-03-11 DIAGNOSIS — Z98.89 OTHER SPECIFIED POSTPROCEDURAL STATES: Chronic | ICD-10-CM

## 2021-03-11 DIAGNOSIS — Z96.652 PRESENCE OF LEFT ARTIFICIAL KNEE JOINT: Chronic | ICD-10-CM

## 2021-03-11 DIAGNOSIS — C61 MALIGNANT NEOPLASM OF PROSTATE: ICD-10-CM

## 2021-03-11 DIAGNOSIS — Z96.0 PRESENCE OF UROGENITAL IMPLANTS: Chronic | ICD-10-CM

## 2021-03-11 PROCEDURE — 99213 OFFICE O/P EST LOW 20 MIN: CPT

## 2021-03-11 PROCEDURE — 96402 CHEMO HORMON ANTINEOPL SQ/IM: CPT

## 2021-03-19 ENCOUNTER — RX RENEWAL (OUTPATIENT)
Age: 86
End: 2021-03-19

## 2021-04-07 ENCOUNTER — INPATIENT (INPATIENT)
Facility: HOSPITAL | Age: 86
LOS: 2 days | Discharge: HOME | End: 2021-04-10
Attending: FAMILY MEDICINE | Admitting: FAMILY MEDICINE
Payer: MEDICARE

## 2021-04-07 VITALS
HEART RATE: 103 BPM | TEMPERATURE: 98 F | RESPIRATION RATE: 16 BRPM | HEIGHT: 68 IN | DIASTOLIC BLOOD PRESSURE: 90 MMHG | WEIGHT: 149.91 LBS | SYSTOLIC BLOOD PRESSURE: 188 MMHG | OXYGEN SATURATION: 97 %

## 2021-04-07 DIAGNOSIS — Z96.652 PRESENCE OF LEFT ARTIFICIAL KNEE JOINT: Chronic | ICD-10-CM

## 2021-04-07 DIAGNOSIS — Z98.89 OTHER SPECIFIED POSTPROCEDURAL STATES: Chronic | ICD-10-CM

## 2021-04-07 DIAGNOSIS — Z96.0 PRESENCE OF UROGENITAL IMPLANTS: Chronic | ICD-10-CM

## 2021-04-07 LAB
ALBUMIN SERPL ELPH-MCNC: 3.6 G/DL — SIGNIFICANT CHANGE UP (ref 3.3–5)
ALP SERPL-CCNC: 85 U/L — SIGNIFICANT CHANGE UP (ref 40–120)
ALT FLD-CCNC: 23 U/L — SIGNIFICANT CHANGE UP (ref 12–78)
ANION GAP SERPL CALC-SCNC: 4 MMOL/L — LOW (ref 5–17)
APPEARANCE UR: CLEAR — SIGNIFICANT CHANGE UP
AST SERPL-CCNC: 20 U/L — SIGNIFICANT CHANGE UP (ref 15–37)
BACTERIA # UR AUTO: NEGATIVE — SIGNIFICANT CHANGE UP
BASOPHILS # BLD AUTO: 0.03 K/UL — SIGNIFICANT CHANGE UP (ref 0–0.2)
BASOPHILS NFR BLD AUTO: 0.3 % — SIGNIFICANT CHANGE UP (ref 0–2)
BILIRUB SERPL-MCNC: 0.6 MG/DL — SIGNIFICANT CHANGE UP (ref 0.2–1.2)
BILIRUB UR-MCNC: NEGATIVE — SIGNIFICANT CHANGE UP
BUN SERPL-MCNC: 21 MG/DL — SIGNIFICANT CHANGE UP (ref 7–23)
CALCIUM SERPL-MCNC: 9.6 MG/DL — SIGNIFICANT CHANGE UP (ref 8.5–10.1)
CHLORIDE SERPL-SCNC: 108 MMOL/L — SIGNIFICANT CHANGE UP (ref 96–108)
CO2 SERPL-SCNC: 30 MMOL/L — SIGNIFICANT CHANGE UP (ref 22–31)
COLOR SPEC: YELLOW — SIGNIFICANT CHANGE UP
CREAT SERPL-MCNC: 1.1 MG/DL — SIGNIFICANT CHANGE UP (ref 0.5–1.3)
DIFF PNL FLD: ABNORMAL
EOSINOPHIL # BLD AUTO: 0.41 K/UL — SIGNIFICANT CHANGE UP (ref 0–0.5)
EOSINOPHIL NFR BLD AUTO: 4.7 % — SIGNIFICANT CHANGE UP (ref 0–6)
EPI CELLS # UR: SIGNIFICANT CHANGE UP
FLUAV AG NPH QL: SIGNIFICANT CHANGE UP
FLUBV AG NPH QL: SIGNIFICANT CHANGE UP
GLUCOSE BLDC GLUCOMTR-MCNC: 80 MG/DL — SIGNIFICANT CHANGE UP (ref 70–99)
GLUCOSE SERPL-MCNC: 93 MG/DL — SIGNIFICANT CHANGE UP (ref 70–99)
GLUCOSE UR QL: NEGATIVE MG/DL — SIGNIFICANT CHANGE UP
HCT VFR BLD CALC: 45.7 % — SIGNIFICANT CHANGE UP (ref 39–50)
HGB BLD-MCNC: 14.4 G/DL — SIGNIFICANT CHANGE UP (ref 13–17)
IMM GRANULOCYTES NFR BLD AUTO: 0.3 % — SIGNIFICANT CHANGE UP (ref 0–1.5)
KETONES UR-MCNC: NEGATIVE — SIGNIFICANT CHANGE UP
LACTATE SERPL-SCNC: 1.3 MMOL/L — SIGNIFICANT CHANGE UP (ref 0.7–2)
LEUKOCYTE ESTERASE UR-ACNC: ABNORMAL
LIDOCAIN IGE QN: 75 U/L — SIGNIFICANT CHANGE UP (ref 73–393)
LYMPHOCYTES # BLD AUTO: 2.58 K/UL — SIGNIFICANT CHANGE UP (ref 1–3.3)
LYMPHOCYTES # BLD AUTO: 29.5 % — SIGNIFICANT CHANGE UP (ref 13–44)
MCHC RBC-ENTMCNC: 27.9 PG — SIGNIFICANT CHANGE UP (ref 27–34)
MCHC RBC-ENTMCNC: 31.5 GM/DL — LOW (ref 32–36)
MCV RBC AUTO: 88.6 FL — SIGNIFICANT CHANGE UP (ref 80–100)
MONOCYTES # BLD AUTO: 0.55 K/UL — SIGNIFICANT CHANGE UP (ref 0–0.9)
MONOCYTES NFR BLD AUTO: 6.3 % — SIGNIFICANT CHANGE UP (ref 2–14)
NEUTROPHILS # BLD AUTO: 5.16 K/UL — SIGNIFICANT CHANGE UP (ref 1.8–7.4)
NEUTROPHILS NFR BLD AUTO: 58.9 % — SIGNIFICANT CHANGE UP (ref 43–77)
NITRITE UR-MCNC: NEGATIVE — SIGNIFICANT CHANGE UP
NRBC # BLD: 0 /100 WBCS — SIGNIFICANT CHANGE UP (ref 0–0)
PH UR: 5 — SIGNIFICANT CHANGE UP (ref 5–8)
PLATELET # BLD AUTO: 205 K/UL — SIGNIFICANT CHANGE UP (ref 150–400)
POTASSIUM SERPL-MCNC: 5.5 MMOL/L — HIGH (ref 3.5–5.3)
POTASSIUM SERPL-SCNC: 5.5 MMOL/L — HIGH (ref 3.5–5.3)
PROT SERPL-MCNC: 6.7 GM/DL — SIGNIFICANT CHANGE UP (ref 6–8.3)
PROT UR-MCNC: 15 MG/DL
RBC # BLD: 5.16 M/UL — SIGNIFICANT CHANGE UP (ref 4.2–5.8)
RBC # FLD: 15.5 % — HIGH (ref 10.3–14.5)
RBC CASTS # UR COMP ASSIST: SIGNIFICANT CHANGE UP /HPF (ref 0–4)
SARS-COV-2 RNA SPEC QL NAA+PROBE: SIGNIFICANT CHANGE UP
SODIUM SERPL-SCNC: 142 MMOL/L — SIGNIFICANT CHANGE UP (ref 135–145)
SP GR SPEC: 1.02 — SIGNIFICANT CHANGE UP (ref 1.01–1.02)
TROPONIN I SERPL-MCNC: <.015 NG/ML — SIGNIFICANT CHANGE UP (ref 0.01–0.04)
UROBILINOGEN FLD QL: 1 MG/DL
WBC # BLD: 8.76 K/UL — SIGNIFICANT CHANGE UP (ref 3.8–10.5)
WBC # FLD AUTO: 8.76 K/UL — SIGNIFICANT CHANGE UP (ref 3.8–10.5)
WBC UR QL: SIGNIFICANT CHANGE UP

## 2021-04-07 PROCEDURE — 99285 EMERGENCY DEPT VISIT HI MDM: CPT | Mod: CS

## 2021-04-07 PROCEDURE — 70450 CT HEAD/BRAIN W/O DYE: CPT | Mod: 26,MA

## 2021-04-07 PROCEDURE — 99223 1ST HOSP IP/OBS HIGH 75: CPT | Mod: AI

## 2021-04-07 PROCEDURE — 93010 ELECTROCARDIOGRAM REPORT: CPT

## 2021-04-07 PROCEDURE — 71045 X-RAY EXAM CHEST 1 VIEW: CPT | Mod: 26

## 2021-04-07 RX ORDER — POLYETHYLENE GLYCOL 3350 17 G/17G
17 POWDER, FOR SOLUTION ORAL DAILY
Refills: 0 | Status: DISCONTINUED | OUTPATIENT
Start: 2021-04-07 | End: 2021-04-10

## 2021-04-07 RX ORDER — HEPARIN SODIUM 5000 [USP'U]/ML
5000 INJECTION INTRAVENOUS; SUBCUTANEOUS EVERY 12 HOURS
Refills: 0 | Status: DISCONTINUED | OUTPATIENT
Start: 2021-04-07 | End: 2021-04-10

## 2021-04-07 RX ORDER — ASPIRIN/CALCIUM CARB/MAGNESIUM 324 MG
81 TABLET ORAL DAILY
Refills: 0 | Status: DISCONTINUED | OUTPATIENT
Start: 2021-04-07 | End: 2021-04-10

## 2021-04-07 RX ORDER — METOPROLOL TARTRATE 50 MG
5 TABLET ORAL ONCE
Refills: 0 | Status: COMPLETED | OUTPATIENT
Start: 2021-04-07 | End: 2021-04-07

## 2021-04-07 RX ORDER — ATORVASTATIN CALCIUM 80 MG/1
40 TABLET, FILM COATED ORAL AT BEDTIME
Refills: 0 | Status: DISCONTINUED | OUTPATIENT
Start: 2021-04-07 | End: 2021-04-10

## 2021-04-07 RX ORDER — SODIUM POLYSTYRENE SULFONATE 4.1 MEQ/G
30 POWDER, FOR SUSPENSION ORAL ONCE
Refills: 0 | Status: COMPLETED | OUTPATIENT
Start: 2021-04-07 | End: 2021-04-07

## 2021-04-07 RX ORDER — SENNA PLUS 8.6 MG/1
2 TABLET ORAL AT BEDTIME
Refills: 0 | Status: DISCONTINUED | OUTPATIENT
Start: 2021-04-07 | End: 2021-04-10

## 2021-04-07 RX ORDER — SODIUM CHLORIDE 9 MG/ML
1000 INJECTION INTRAMUSCULAR; INTRAVENOUS; SUBCUTANEOUS ONCE
Refills: 0 | Status: COMPLETED | OUTPATIENT
Start: 2021-04-07 | End: 2021-04-07

## 2021-04-07 RX ORDER — AMLODIPINE BESYLATE 2.5 MG/1
5 TABLET ORAL DAILY
Refills: 0 | Status: DISCONTINUED | OUTPATIENT
Start: 2021-04-07 | End: 2021-04-10

## 2021-04-07 RX ADMIN — Medication 5 MILLIGRAM(S): at 16:41

## 2021-04-07 RX ADMIN — SODIUM CHLORIDE 500 MILLILITER(S): 9 INJECTION INTRAMUSCULAR; INTRAVENOUS; SUBCUTANEOUS at 16:41

## 2021-04-07 RX ADMIN — AMLODIPINE BESYLATE 5 MILLIGRAM(S): 2.5 TABLET ORAL at 22:35

## 2021-04-07 RX ADMIN — SODIUM POLYSTYRENE SULFONATE 30 GRAM(S): 4.1 POWDER, FOR SUSPENSION ORAL at 17:36

## 2021-04-07 RX ADMIN — ATORVASTATIN CALCIUM 40 MILLIGRAM(S): 80 TABLET, FILM COATED ORAL at 22:35

## 2021-04-07 RX ADMIN — HEPARIN SODIUM 5000 UNIT(S): 5000 INJECTION INTRAVENOUS; SUBCUTANEOUS at 19:23

## 2021-04-07 RX ADMIN — SENNA PLUS 2 TABLET(S): 8.6 TABLET ORAL at 22:35

## 2021-04-07 NOTE — ED PROVIDER NOTE - CRANIAL NERVE AND PUPILLARY EXAM
cranial nerves 2-12 intact/central and peripheral vision intact/peripheral vision intact/extra-ocular movements intact

## 2021-04-07 NOTE — ED ADULT NURSE NOTE - PMH
Fall  3/15/17, slipped on ice , injured left side of the body, denies broken bones, still with minor soreness.  [place] (hard of hearing)  uses hearing aids PRN  Hypertension    Prostate ca  1992, Lupron Inj Q 3 months  Vertigo  2005 and 12/2016, uses meclizine PRN, last episode 12/2016

## 2021-04-07 NOTE — ED PROVIDER NOTE - NIH STROKE SCALE: 11. EXTINCTION AND INATTENTION, QM
(0) No abnormality Closure 4 Information: This tab is for additional flaps and grafts above and beyond our usual structured repairs.  Please note if you enter information here it will not currently bill and you will need to add the billing information manually.

## 2021-04-07 NOTE — ED ADULT NURSE NOTE - NSIMPLEMENTINTERV_GEN_ALL_ED
Implemented All Fall Risk Interventions:  [place] to call system. Call bell, personal items and telephone within reach. Instruct patient to call for assistance. Room bathroom lighting operational. Non-slip footwear when patient is off stretcher. Physically safe environment: no spills, clutter or unnecessary equipment. Stretcher in lowest position, wheels locked, appropriate side rails in place. Provide visual cue, wrist band, yellow gown, etc. Monitor gait and stability. Monitor for mental status changes and reorient to person, place, and time. Review medications for side effects contributing to fall risk. Reinforce activity limits and safety measures with patient and family.

## 2021-04-07 NOTE — ED ADULT TRIAGE NOTE - CHIEF COMPLAINT QUOTE
Pt aphasic this am last seen normal 10 pm last night hx dementia FS 80 in triage pt nonverbal with blank gaze, not following commands

## 2021-04-07 NOTE — ED PROVIDER NOTE - PMH
Fall  3/15/17, slipped on ice , injured left side of the body, denies broken bones, still with minor soreness.  [place] (hard of hearing)  uses hearing aids PRN  Hypertension    Prostate ca  1992, Lupron Inj Q 3 months  Vertigo  2005 and 12/2016, uses meclizine PRN, last episode 12/2016

## 2021-04-07 NOTE — H&P ADULT - ASSESSMENT
86 year old male PMH of alzheimer's, dementia, who presents to the ED today for increased confusion. Pt's normal at baseline is able to walk and speak slightly. Yesterday pt was more confused than usual and stopped eating this morning. Last night pt had refused his medication. Wife states pt had not had fever, chills, complaints of pain. CT head on arrival possible cerebellar infarct.     Neuro:  86 year old male PMH of alzheimer's, dementia, who presents to the ED today for increased confusion. Pt's normal at baseline is able to walk and speak slightly. Yesterday pt was more confused than usual and stopped eating this morning. Last night pt had refused his medication. Wife states pt had not had fever, chills, complaints of pain. CT head on arrival possible cerebellar infarct.     Neuro: Increased confusion at home, suspect this is just dementia manifestations. MRI brain non-contrast ordered. Continue Norvasc   5 mg/day from home for BP, add ASA 81 mg/day and Lipitor 40 mg/day. Lipid panel in AM. PT eval in AM. UA negative, CXR   clear, no infection source. No ABX needed. Admit to tele, initial trop negative.

## 2021-04-07 NOTE — H&P ADULT - HISTORY OF PRESENT ILLNESS
86 year old male PMH of alzheimer's, dementia, who presents to the ED today for increased confusion. Pt's normal at baseline is able to walk and speak slightly. Yesterday pt was more confused than usual and stopped eating this morning. Last night pt had refused his medication. Wife states pt had not had fever, chills, complaints of pain. CT head on arrival possible cerebellar infarct.  86 year old male PMH of alzheimer's, dementia, who presents to the ED today for increased confusion.  Pt's normal at baseline is able to walk and speak slightly. Yesterday pt was more confused than usual and stopped eating this morning. Last night pt had refused his medication. Wife states pt had not had fever, chills, complaints of pain. CT head on arrival possible cerebellar infarct.

## 2021-04-07 NOTE — ED ADULT NURSE NOTE - EXTENSIONS OF SELF_ADULT
Fracture Follow up    ALLERGIES:   Allergen Reactions   • Penicillins HIVES   • Ciprofloxacin RASH   • Lexapro Other (See Comments)     Auditory hallucinations   • Neosporin [Neomycin-Bacitracin-Polymyxin] RASH     Medications reviewed with patient.  Tobacco use verified.  Primary medical doctor: Dennis Souza MD      DATE OF INJURY:  8/25/17  LAST OFFICE VISIT: 9/1/17    Patient is here for follow up of right shoulder fracture.    Refills needed: No     PREVIOUS TREATMENT: medications including NSAID, acetaminophen, a brace, icing  and rest     Is there anything else in your medical history that you feel is important to your treatment that we haven't covered? NO         None/Eyeglasses

## 2021-04-07 NOTE — ED PROVIDER NOTE - OBJECTIVE STATEMENT
Pt is an 86 year old male w/PMH of alzheimer's, dementia, who presents to the ED today for increased confusion. Pt's normal at baseline is able to walk and speak slightly. Yesterday pt was more confused than usual and stopped eating this morning. Last night pt had refused his medication. Wife states pt had not had fever, chills, complaints of pain.

## 2021-04-07 NOTE — H&P ADULT - NSHPPHYSICALEXAM_GEN_ALL_CORE
PHYSICAL EXAMINATION:  Vital Signs Last 24 Hrs  T(C): 36.8 (07 Apr 2021 14:26), Max: 36.8 (07 Apr 2021 14:26)  T(F): 98.2 (07 Apr 2021 14:26), Max: 98.2 (07 Apr 2021 14:26)  HR: 55 (07 Apr 2021 16:19) (55 - 103)  BP: 145/72 (07 Apr 2021 16:19) (145/72 - 188/90)  BP(mean): --  RR: 17 (07 Apr 2021 16:19) (16 - 17)  SpO2: 96% (07 Apr 2021 16:19) (96% - 97%)  CAPILLARY BLOOD GLUCOSE      POCT Blood Glucose.: 80 mg/dL (07 Apr 2021 14:28)      GENERAL: NAD, well-groomed, well-developed  HEAD:  atraumatic, normocephalic  EYES: sclera anicteric  ENMT: mucous membranes moist  NECK: supple, No JVD  CHEST/LUNG: clear to auscultation bilaterally; no rales, rhonchi, or wheezing b/l  HEART: normal S1, S2  ABDOMEN: BS+, soft, ND, NT   EXTREMITIES:  pulses palpable; no clubbing, cyanosis, or edema b/l LEs  NEURO: awake, alert, interactive; moves all extremities  SKIN: no rashes or lesions PHYSICAL EXAMINATION:  Vital Signs Last 24 Hrs  T(C): 36.8 (07 Apr 2021 14:26), Max: 36.8 (07 Apr 2021 14:26)  T(F): 98.2 (07 Apr 2021 14:26), Max: 98.2 (07 Apr 2021 14:26)  HR: 55 (07 Apr 2021 16:19) (55 - 103)  BP: 145/72 (07 Apr 2021 16:19) (145/72 - 188/90)  BP(mean): --  RR: 17 (07 Apr 2021 16:19) (16 - 17)  SpO2: 96% (07 Apr 2021 16:19) (96% - 97%)  CAPILLARY BLOOD GLUCOSE      POCT Blood Glucose.: 80 mg/dL (07 Apr 2021 14:28)      GENERAL: NAD, seen in ER, comfortable, answers questions, speech fluent   HEAD:  atraumatic, normocephalic  EYES: sclera anicteric  ENMT: mucous membranes moist  NECK: supple, No JVD  CHEST/LUNG: clear to auscultation bilaterally; no rales, rhonchi, or wheezing b/l  HEART: normal S1, S2  ABDOMEN: BS+, soft, ND, NT   EXTREMITIES:  pulses palpable; no clubbing, cyanosis, or edema b/l LEs  NEURO: awake, alert, interactive; moves all extremities  MP grossly 5/5 bilaterally  SKIN: no rashes or lesions

## 2021-04-07 NOTE — H&P ADULT - NSHPLABSRESULTS_GEN_ALL_CORE
LABS:                        14.4   8.76  )-----------( 205      ( 2021 15:23 )             45.7     04-07    142  |  108  |  21  ----------------------------<  93  5.5<H>   |  30  |  1.10    Ca    9.6      2021 15:23    TPro  6.7  /  Alb  3.6  /  TBili  0.6  /  DBili  x   /  AST  20  /  ALT  23  /  AlkPhos  85  04-07      Urinalysis Basic - ( 2021 17:16 )    Color: Yellow / Appearance: Clear / S.025 / pH: x  Gluc: x / Ketone: Negative  / Bili: Negative / Urobili: 1 mg/dL   Blood: x / Protein: 15 mg/dL / Nitrite: Negative   Leuk Esterase: Trace / RBC: x / WBC x   Sq Epi: x / Non Sq Epi: x / Bacteria: x          RADIOLOGY & ADDITIONAL TESTS:

## 2021-04-07 NOTE — ED PROVIDER NOTE - CLINICAL SUMMARY MEDICAL DECISION MAKING FREE TEXT BOX
Pt with increased confusion from home with alzheimer's will work up for possibility of infection vs cardiac cause or stroke will admit to further care.

## 2021-04-08 LAB
ANION GAP SERPL CALC-SCNC: 6 MMOL/L — SIGNIFICANT CHANGE UP (ref 5–17)
BUN SERPL-MCNC: 17 MG/DL — SIGNIFICANT CHANGE UP (ref 7–23)
CALCIUM SERPL-MCNC: 8.8 MG/DL — SIGNIFICANT CHANGE UP (ref 8.5–10.1)
CHLORIDE SERPL-SCNC: 109 MMOL/L — HIGH (ref 96–108)
CHOLEST SERPL-MCNC: 162 MG/DL — SIGNIFICANT CHANGE UP
CO2 SERPL-SCNC: 25 MMOL/L — SIGNIFICANT CHANGE UP (ref 22–31)
COVID-19 SPIKE DOMAIN AB INTERP: NEGATIVE — SIGNIFICANT CHANGE UP
COVID-19 SPIKE DOMAIN ANTIBODY RESULT: 0.4 U/ML — SIGNIFICANT CHANGE UP
CREAT SERPL-MCNC: 0.7 MG/DL — SIGNIFICANT CHANGE UP (ref 0.5–1.3)
CULTURE RESULTS: NO GROWTH — SIGNIFICANT CHANGE UP
GLUCOSE SERPL-MCNC: 94 MG/DL — SIGNIFICANT CHANGE UP (ref 70–99)
HDLC SERPL-MCNC: 39 MG/DL — LOW
LIPID PNL WITH DIRECT LDL SERPL: 100 MG/DL — HIGH
NON HDL CHOLESTEROL: 124 MG/DL — SIGNIFICANT CHANGE UP
POTASSIUM SERPL-MCNC: 3.4 MMOL/L — LOW (ref 3.5–5.3)
POTASSIUM SERPL-SCNC: 3.4 MMOL/L — LOW (ref 3.5–5.3)
SARS-COV-2 IGG+IGM SERPL QL IA: 0.4 U/ML — SIGNIFICANT CHANGE UP
SARS-COV-2 IGG+IGM SERPL QL IA: NEGATIVE — SIGNIFICANT CHANGE UP
SODIUM SERPL-SCNC: 140 MMOL/L — SIGNIFICANT CHANGE UP (ref 135–145)
SPECIMEN SOURCE: SIGNIFICANT CHANGE UP
TRIGL SERPL-MCNC: 118 MG/DL — SIGNIFICANT CHANGE UP

## 2021-04-08 PROCEDURE — 70551 MRI BRAIN STEM W/O DYE: CPT | Mod: 26

## 2021-04-08 PROCEDURE — 99222 1ST HOSP IP/OBS MODERATE 55: CPT

## 2021-04-08 PROCEDURE — 99232 SBSQ HOSP IP/OBS MODERATE 35: CPT

## 2021-04-08 RX ORDER — POTASSIUM CHLORIDE 20 MEQ
40 PACKET (EA) ORAL ONCE
Refills: 0 | Status: COMPLETED | OUTPATIENT
Start: 2021-04-08 | End: 2021-04-08

## 2021-04-08 RX ADMIN — SENNA PLUS 2 TABLET(S): 8.6 TABLET ORAL at 21:39

## 2021-04-08 RX ADMIN — AMLODIPINE BESYLATE 5 MILLIGRAM(S): 2.5 TABLET ORAL at 05:43

## 2021-04-08 RX ADMIN — Medication 81 MILLIGRAM(S): at 11:47

## 2021-04-08 RX ADMIN — ATORVASTATIN CALCIUM 40 MILLIGRAM(S): 80 TABLET, FILM COATED ORAL at 21:40

## 2021-04-08 RX ADMIN — HEPARIN SODIUM 5000 UNIT(S): 5000 INJECTION INTRAVENOUS; SUBCUTANEOUS at 05:43

## 2021-04-08 RX ADMIN — Medication 40 MILLIEQUIVALENT(S): at 15:29

## 2021-04-08 RX ADMIN — HEPARIN SODIUM 5000 UNIT(S): 5000 INJECTION INTRAVENOUS; SUBCUTANEOUS at 17:11

## 2021-04-08 RX ADMIN — POLYETHYLENE GLYCOL 3350 17 GRAM(S): 17 POWDER, FOR SOLUTION ORAL at 11:48

## 2021-04-08 NOTE — CONSULT NOTE ADULT - SUBJECTIVE AND OBJECTIVE BOX
NEUROLOGY CONSULT    HPI:  85 yo man with Alzheimer dementia, who reported became unresponsive, confused, stopped eating yesterday morning, and was brought to ER. He had been last seen normal the night before at 10pm, therefore no IVtPA was considered (NIHSS = 5).  He has improved since admission.    PMHx: AD, HTN, prostate CA, chronic vertigo    MRI brain: global atrophy, chronic ischemic white matter changes, no acute pathology    CXR-neg  UA-neg    NEUROLOGICAL EXAM:  T 98.5 F  /74  HR 68  MS: Awake, alert, oriented x 1 (does not know location, month, or year), language dysfluent, comprehension intact (follows simple 1 step commands), naming impaired, repetition impaired, normal affect  CN: PERRLA, EOMI, visual fields intact, facial sensation intact, face symmetric, no dysarthria, symmetric palatal elevation, tongue midline, symmetric shoulder shrug and SCM strength  Motor: normal bulk, normal tone, power 5/5 in all four extremities, no tremors or fasciculations  Sensation: intact to light touch  Reflexes: 2+ at biceps,  brachioradialis, patella, ankle bilaterally.  Flexor plantar response bilaterally.  No clonus  Coordination: no dysmetria    Assessment:  85 yo man with advance dementia (likely AD and vascular), HTN presenting transient alteration in mental status, improved.  DiffDx includes TIA, although less likely.    Recommendations:  1. EEG  2. Optimize control of hypertension  3. Aspirin 81mg daily  4. Atorvastatin 40mg daily, LDL target < 70    Darwin Iglesias MD  [facility] Department of Neurology  [facility]

## 2021-04-08 NOTE — PROGRESS NOTE ADULT - ASSESSMENT
86 year old male PMH of alzheimer's, dementia, who presents to the ED today for increased confusion. Pt's normal at baseline is able to walk and speak slightly. Yesterday pt was more confused than usual and stopped eating this morning. Last night pt had refused his medication. Wife states pt had not had fever, chills, complaints of pain. CT head on arrival possible cerebellar infarct.     Neuro: Increased confusion at home, suspect this is just dementia manifestations. MRI brain non-contrast ordered. Continue Norvasc   5 mg/day from home for BP, add ASA 81 mg/day and Lipitor 40 mg/day. Lipid panel in AM. PT eval in AM. UA negative, CXR   clear, no infection source. No ABX needed. Admit to tele, initial trop negative.   86 year old male PMH of alzheimer's, dementia, who presents to the ED today for increased confusion. Pt's normal at baseline is able to walk and speak slightly. Yesterday pt was more confused than usual and stopped eating this morning. Last night pt had refused his medication. Wife states pt had not had fever, chills, complaints of pain. CT head on arrival possible cerebellar infarct.     Neuro: Increased confusion at home, suspect this is just dementia manifestations. MRI brain non-contrast pending. Continue Norvasc 5 mg/day from home for BP, add ASA 81 mg/day and Lipitor 40 mg/day. Lipid panel in AM. PT eval in AM. UA negative, CXR  clear, no infection source. No ABX needed. Stop tele, initial trop negative.     Discharge home Friday after PT eval and MRI read.

## 2021-04-08 NOTE — PROGRESS NOTE ADULT - SUBJECTIVE AND OBJECTIVE BOX
INTERVAL HPI/OVERNIGHT EVENTS:  Pt seen and examined at bedside.     Allergies/Intolerance: penicillin (Hives)      MEDICATIONS  (STANDING):  amLODIPine   Tablet 5 milliGRAM(s) Oral daily  aspirin enteric coated 81 milliGRAM(s) Oral daily  atorvastatin 40 milliGRAM(s) Oral at bedtime  heparin   Injectable 5000 Unit(s) SubCutaneous every 12 hours  polyethylene glycol 3350 17 Gram(s) Oral daily  senna 2 Tablet(s) Oral at bedtime    MEDICATIONS  (PRN):        ROS: all systems reviewed and wnl      PHYSICAL EXAMINATION:  Vital Signs Last 24 Hrs  T(C): 36.8 (2021 04:37), Max: 37.1 (2021 19:30)  T(F): 98.2 (2021 04:37), Max: 98.7 (2021 19:30)  HR: 60 (2021 04:37) (54 - 103)  BP: 139/80 (2021 04:37) (130/79 - 188/90)  BP(mean): 108 (2021 20:28) (108 - 108)  RR: 16 (2021 04:37) (15 - 18)  SpO2: 94% (2021 04:37) (94% - 97%)  CAPILLARY BLOOD GLUCOSE      POCT Blood Glucose.: 80 mg/dL (2021 14:28)      04-07 @ 07:01  -  04-08 @ 07:00  --------------------------------------------------------  IN: 0 mL / OUT: 200 mL / NET: -200 mL        GENERAL:   NECK: supple, No JVD  CHEST/LUNG: clear to auscultation bilaterally; no rales, rhonchi, or wheezing b/l  HEART: normal S1, S2  ABDOMEN: BS+, soft, ND, NT   EXTREMITIES:  pulses palpable; no clubbing, cyanosis, or edema b/l LEs  SKIN: no rashes or lesions      LABS:                        14.4   8.76  )-----------( 205      ( 2021 15:23 )             45.7     04-08    140  |  109<H>  |  17  ----------------------------<  94  3.4<L>   |  25  |  0.70    Ca    8.8      2021 07:19    TPro  6.7  /  Alb  3.6  /  TBili  0.6  /  DBili  x   /  AST  20  /  ALT  23  /  AlkPhos  85  04-07      Urinalysis Basic - ( 2021 17:16 )    Color: Yellow / Appearance: Clear / S.025 / pH: x  Gluc: x / Ketone: Negative  / Bili: Negative / Urobili: 1 mg/dL   Blood: x / Protein: 15 mg/dL / Nitrite: Negative   Leuk Esterase: Trace / RBC: 0-2 /HPF / WBC 0-2   Sq Epi: x / Non Sq Epi: Occasional / Bacteria: Negative             INTERVAL HPI/OVERNIGHT EVENTS:  Pt seen and examined at bedside.     Allergies/Intolerance: penicillin (Hives)      MEDICATIONS  (STANDING):  amLODIPine   Tablet 5 milliGRAM(s) Oral daily  aspirin enteric coated 81 milliGRAM(s) Oral daily  atorvastatin 40 milliGRAM(s) Oral at bedtime  heparin   Injectable 5000 Unit(s) SubCutaneous every 12 hours  polyethylene glycol 3350 17 Gram(s) Oral daily  senna 2 Tablet(s) Oral at bedtime    MEDICATIONS  (PRN):        ROS: all systems reviewed and wnl      PHYSICAL EXAMINATION:  Vital Signs Last 24 Hrs  T(C): 36.8 (2021 04:37), Max: 37.1 (2021 19:30)  T(F): 98.2 (2021 04:37), Max: 98.7 (2021 19:30)  HR: 60 (2021 04:37) (54 - 103)  BP: 139/80 (2021 04:37) (130/79 - 188/90)  BP(mean): 108 (2021 20:28) (108 - 108)  RR: 16 (2021 04:37) (15 - 18)  SpO2: 94% (2021 04:37) (94% - 97%)  CAPILLARY BLOOD GLUCOSE      POCT Blood Glucose.: 80 mg/dL (2021 14:28)      -07 @ 07:01  -  -08 @ 07:00  --------------------------------------------------------  IN: 0 mL / OUT: 200 mL / NET: -200 mL        GENERAL: stable, in bed, NAD, comfortable, no fevers   NECK: supple, No JVD  CHEST/LUNG: clear to auscultation bilaterally; no rales, rhonchi, or wheezing b/l  HEART: normal S1, S2  ABDOMEN: BS+, soft, ND, NT   EXTREMITIES:  pulses palpable; no clubbing, cyanosis, or edema b/l LEs  SKIN: no rashes or lesions      LABS:                        14.4   8.76  )-----------( 205      ( 2021 15:23 )             45.7     04-08    140  |  109<H>  |  17  ----------------------------<  94  3.4<L>   |  25  |  0.70    Ca    8.8      2021 07:19    TPro  6.7  /  Alb  3.6  /  TBili  0.6  /  DBili  x   /  AST  20  /  ALT  23  /  AlkPhos  85  04-07      Urinalysis Basic - ( 2021 17:16 )    Color: Yellow / Appearance: Clear / S.025 / pH: x  Gluc: x / Ketone: Negative  / Bili: Negative / Urobili: 1 mg/dL   Blood: x / Protein: 15 mg/dL / Nitrite: Negative   Leuk Esterase: Trace / RBC: 0-2 /HPF / WBC 0-2   Sq Epi: x / Non Sq Epi: Occasional / Bacteria: Negative

## 2021-04-09 ENCOUNTER — TRANSCRIPTION ENCOUNTER (OUTPATIENT)
Age: 86
End: 2021-04-09

## 2021-04-09 PROCEDURE — 99232 SBSQ HOSP IP/OBS MODERATE 35: CPT

## 2021-04-09 PROCEDURE — 95816 EEG AWAKE AND DROWSY: CPT | Mod: 26

## 2021-04-09 RX ORDER — ATORVASTATIN CALCIUM 80 MG/1
1 TABLET, FILM COATED ORAL
Qty: 30 | Refills: 0
Start: 2021-04-09

## 2021-04-09 RX ORDER — ZOLPIDEM TARTRATE 10 MG/1
0.5 TABLET ORAL
Qty: 0 | Refills: 0 | DISCHARGE

## 2021-04-09 RX ORDER — ERGOCALCIFEROL 1.25 MG/1
1 CAPSULE ORAL
Qty: 0 | Refills: 0 | DISCHARGE

## 2021-04-09 RX ORDER — ASPIRIN/CALCIUM CARB/MAGNESIUM 324 MG
1 TABLET ORAL
Qty: 0 | Refills: 0 | DISCHARGE
Start: 2021-04-09

## 2021-04-09 RX ORDER — CEPHALEXIN 500 MG
1 CAPSULE ORAL
Qty: 0 | Refills: 0 | DISCHARGE

## 2021-04-09 RX ADMIN — AMLODIPINE BESYLATE 5 MILLIGRAM(S): 2.5 TABLET ORAL at 05:18

## 2021-04-09 RX ADMIN — ATORVASTATIN CALCIUM 40 MILLIGRAM(S): 80 TABLET, FILM COATED ORAL at 21:00

## 2021-04-09 RX ADMIN — POLYETHYLENE GLYCOL 3350 17 GRAM(S): 17 POWDER, FOR SOLUTION ORAL at 12:22

## 2021-04-09 RX ADMIN — Medication 81 MILLIGRAM(S): at 12:22

## 2021-04-09 RX ADMIN — HEPARIN SODIUM 5000 UNIT(S): 5000 INJECTION INTRAVENOUS; SUBCUTANEOUS at 17:00

## 2021-04-09 RX ADMIN — HEPARIN SODIUM 5000 UNIT(S): 5000 INJECTION INTRAVENOUS; SUBCUTANEOUS at 05:18

## 2021-04-09 RX ADMIN — SENNA PLUS 2 TABLET(S): 8.6 TABLET ORAL at 21:00

## 2021-04-09 NOTE — OCCUPATIONAL THERAPY INITIAL EVALUATION ADULT - LIVES WITH, PROFILE
in a private house with steps to negotiate. Pt is a poor historian and could provide information about his living environment/spouse

## 2021-04-09 NOTE — OCCUPATIONAL THERAPY INITIAL EVALUATION ADULT - LEVEL OF INDEPENDENCE, OT EVAL
Patient is requesting an appoitment before the first available for a painful and itching rash for 1 week. Please call [phone].
Patient of Dr. Meenu Gonzalez requesting a sooner appointment for a burning rash on lower back. RASH CHARACTERISTICS    Location/Distribution: Rash first appeared lower back. Has spread to . Jose Carlos Bear ..getting larger surface  LESION(S): When rash first appeared, was small little red bumps, clear fluid, they burst and then scab over  Arrangements: No scattered but close together  Duration: present for over a week  Spreading: Yes   Color: Red bumps  Subjective s/sx: Itching, and burning and friction from waist band    Have you treated THIS rash? No         Allergies: to shrimp---but not applicable to this rash     Recently outdoors? Yes  In [facility] [place]  [place]. .. medications, detergents, lotions, soaps? No     Other pertinent information: In [facility] a few weeks ago and did get a sunburn said that area was itchy prior to rash breaking out.        Scheduled with Dr. Dinesh Drummond on 4/23/18 at 11:15am at [facility]
unable to perform

## 2021-04-09 NOTE — DISCHARGE NOTE PROVIDER - NSDCQMPCI_CARD_ALL_CORE
Spoke with patient and got him scheduled for his Colon with MAC. Patient is scheduled on 12/3/19 at 9:30 with Dr. Navarro.   No

## 2021-04-09 NOTE — DISCHARGE NOTE PROVIDER - NSDCFUSCHEDAPPT_GEN_ALL_CORE_FT
WOLFGANG AGUSTIN ; 06/10/2021 ; [facility] [address] [place] [address]  WOLFGANG AGUSTIN ; 06/10/2021 ; [facility] [address] [place] [address]

## 2021-04-09 NOTE — OCCUPATIONAL THERAPY INITIAL EVALUATION ADULT - PLANNED THERAPY INTERVENTIONS, OT EVAL
ADL retraining/balance training/bed mobility training/parent/caregiver training.../ROM/strengthening/transfer training

## 2021-04-09 NOTE — OCCUPATIONAL THERAPY INITIAL EVALUATION ADULT - GENERAL OBSERVATIONS, REHAB EVAL
Pt was seen for initial OT consult, encountered in bed in NAD. Elmer cath is in place and pt is on 1:1 observation. Pt was AA&Ox to name, confused, but cooperative. Pt followed 1 step command with verbal cues for sequencing. Pt presents with cognitive regression and generalized weakness. These impact pt's activity tolerance ,balance, ADL management, coordination and functional mobility.

## 2021-04-09 NOTE — OCCUPATIONAL THERAPY INITIAL EVALUATION ADULT - PERTINENT HX OF CURRENT PROBLEM, REHAB EVAL
Pt is 86 year old male PMH of alzheimer's, dementia, who presents to the ED today for increased confusion. Pt is DX with weakness. MRI on 4/8/21 confirmed  Chronic microvascular disease and age appropriate volume loss with ex vacuo dilatation.

## 2021-04-09 NOTE — DISCHARGE NOTE PROVIDER - NSDCFUADDINST_GEN_ALL_CORE_FT
It is important to see your primary physician as well as the physicians noted below within the next week to perform a comprehensive medical review.  Call their offices for an appointment as soon as you leave the hospital.  If you do not have a primary physician, contact the [facility] Physician Referral Service at [phone].  To obtain your results, you can access the [facility] Portal at [email or web address].  Your medical issues appear to be stable at this time, but if your symptoms recur or worsen, contact your physicians and/or return to the hospital if necessary.  If you encounter any issues or questions with your medication, call your physicians before stopping the medication.  Do not drive.  Limit your diet to 2 grams of sodium daily.

## 2021-04-09 NOTE — PROGRESS NOTE ADULT - SUBJECTIVE AND OBJECTIVE BOX
Patient: WOLFGANG AGUSTIN [id number] 86y Male                            Hospitalist Attending Note    Seen with wife at bedside  Appears to be at baseline.  No new weakness / numbness.  Speech baseline.     ____________________PHYSICAL EXAM:  GENERAL:  NAD, Alert and Oriented x 1 to person   HEENT: NCAT  CARDIOVASCULAR:  S1, S2  LUNGS: CTAB  ABDOMEN:  soft, (-) tenderness, (-) distension, (+) bowel sounds, (-) guarding, (-) rebound (-) rigidity  EXTREMITIES:  no cyanosis / clubbing / edema.   NEURO: strength grossly symmetric, sensation intact.  Speech intact.   ____________________     VITALS:  Vital Signs Last 24 Hrs  T(C): 36.7 (2021 11:55), Max: 36.7 (2021 04:41)  T(F): 98 (2021 11:55), Max: 98.1 (2021 04:41)  HR: 76 (2021 14:30) (68 - 87)  BP: 122/74 (2021 14:30) (111/63 - 158/84)  BP(mean): --  RR: 18 (2021 11:55) (18 - 18)  SpO2: 96% (2021 14:30) (95% - 99%) Daily     Daily Weight in k.3 (2021 04:41)  CAPILLARY BLOOD GLUCOSE        I&O's Summary    2021 07:  -  2021 07:00  --------------------------------------------------------  IN: 360 mL / OUT: 300 mL / NET: 60 mL    2021 07:01  -  2021 16:11  --------------------------------------------------------  IN: 120 mL / OUT: 0 mL / NET: 120 mL        HISTORY:  PAST MEDICAL & SURGICAL HISTORY:  Prostate ca  , Lupron Inj Q 3 months    Vertigo   and 2016, uses meclizine PRN, last episode 2016    [place] (hard of hearing)  uses hearing aids PRN    Hypertension    Fall  3/15/17, slipped on ice , injured left side of the body, denies broken bones, still with minor soreness.    History of prostate surgery      Status post implantation of artificial urinary sphincter      History of knee replacement, total, left  , s/p revision     Allergies    penicillin (Hives)    Intolerances       LABS:      Culture - Urine (collected 2021 00:34)  Source: .Urine Clean Catch (Midstream)  Final Report (2021 21:31):    No growth    04-    140  |  109<H>  |  17  ----------------------------<  94  3.4<L>   |  25  |  0.70    Ca    8.8      2021 07:19          Urinalysis Basic - ( 2021 17:16 )    Color: Yellow / Appearance: Clear / S.025 / pH: x  Gluc: x / Ketone: Negative  / Bili: Negative / Urobili: 1 mg/dL   Blood: x / Protein: 15 mg/dL / Nitrite: Negative   Leuk Esterase: Trace / RBC: 0-2 /HPF / WBC 0-2   Sq Epi: x / Non Sq Epi: Occasional / Bacteria: Negative          Culture - Urine (collected 2021 00:34)  Source: .Urine Clean Catch (Midstream)  Final Report (2021 21:31):    No growth          MEDICATIONS:  MEDICATIONS  (STANDING):  amLODIPine   Tablet 5 milliGRAM(s) Oral daily  aspirin enteric coated 81 milliGRAM(s) Oral daily  atorvastatin 40 milliGRAM(s) Oral at bedtime  heparin   Injectable 5000 Unit(s) SubCutaneous every 12 hours  polyethylene glycol 3350 17 Gram(s) Oral daily  senna 2 Tablet(s) Oral at bedtime    MEDICATIONS  (PRN):

## 2021-04-09 NOTE — OCCUPATIONAL THERAPY INITIAL EVALUATION ADULT - STRENGTHENING, PT EVAL
Pt will increased BUE/LE muscle strength by 1 full grade to assist with functional mobility and self care tasks within 8 weeks

## 2021-04-09 NOTE — OCCUPATIONAL THERAPY INITIAL EVALUATION ADULT - ADDITIONAL COMMENTS
Prior to admission, pt was  ambulating independently with supervision of family. Pt's family assisted him with self care tasks. Dexterity and fine motor skills are diminished in both hand. Pt able to reach out of base of support in sitting without loss of balance but unable to do it in standing. Prior to admission, pt was  ambulating independently with supervision of family. Pt's family assisted him with self care tasks. Dexterity and fine motor skills are diminished in both hand. Pt is able to reach out of base of support in sitting without loss of balance but unable to do it in standing.

## 2021-04-09 NOTE — DISCHARGE NOTE PROVIDER - CARE PROVIDER_API CALL
Darwin Iglesias)  Clinical Neurophysiology; Neurology  [address]  [place] [address]  Phone: [phone]  Fax: [phone]  Follow Up Time:

## 2021-04-09 NOTE — DISCHARGE NOTE PROVIDER - NSDCMRMEDTOKEN_GEN_ALL_CORE_FT
amLODIPine 5 mg oral tablet: 1 tab(s) orally once a day  aspirin 81 mg oral delayed release tablet: 1 tab(s) orally once a day  atorvastatin 40 mg oral tablet: 1 tab(s) orally once a day (at bedtime)  Lupron [place] 22.5 mg/3 months intramuscular injection, extended release:  intramuscular every 3 month, last dose 4/19/17

## 2021-04-09 NOTE — OCCUPATIONAL THERAPY INITIAL EVALUATION ADULT - TRANSFER TRAINING, PT EVAL
Pt will transfer to all surfaces, safely and independently with the least restrictive adaptive device within 8 weeks.

## 2021-04-09 NOTE — DISCHARGE NOTE PROVIDER - HOSPITAL COURSE
85yo M [facility] Alzheimer's Dementia presented with episode of unresponsiveness and confusion.  Admitted for evaluation, seen by Neurology    # Altered Mental Status - seen by neurology.  ? TIA.  Continue ASA, Statin.  MRI without acute infarct.  Awaiting EEG.  Has been OOB with PT.   # Essential HTN - Monitor BP.  Continue oral antihypertensives.  BP stable  # DVT prophylaxis - subcutaneous Heparin     Plan of care d/w wife Maggie at bedside ([phone]).  Plan d/c home pending Neurology followup. 85yo M [facility] Alzheimer's Dementia presented with episode of unresponsiveness and confusion.  Admitted for evaluation, seen by Neurology    # Altered Mental Status - seen by neurology.  ? TIA.  Continue ASA, Statin.  MRI without acute infarct.  EEG-no seizure activity seen. Has been OOB with PT.   # Essential HTN - Monitor BP.  Continue oral antihypertensives.  BP stable  # DVT prophylaxis - subcutaneous Heparin     Plan of care d/w wife Maggie at bedside ([phone]).  Plan to follow up with Neurology- wife has card/follow up numbers provided to her 85yo M [facility] Alzheimer's Dementia presented with episode of unresponsiveness and confusion.  Admitted for evaluation, seen by Neurology    # Altered Mental Status - seen by neurology.  ? TIA.  Continue ASA, Statin.  MRI without acute infarct.  EEG-no seizure activity seen. Has been OOB with PT.   # Essential HTN - Monitor BP.  Continue oral antihypertensives.  BP stable    Plan of care d/w wife Maggie at bedside ([phone]).  Plan to follow up with Neurology- wife has card/follow up numbers provided to her

## 2021-04-09 NOTE — PROGRESS NOTE ADULT - ASSESSMENT
87yo M [facility] Alzheimer's Dementia presented with episode of unresponsiveness and confusion.  Admitted for evaluation, seen by Neurology    # Altered Mental Status - seen by neurology.  ? TIA.  Continue ASA, Statin.  MRI without acute infarct.  Awaiting EEG.  Has been OOB with PT.   # Essential HTN - Monitor BP.  Continue oral antihypertensives.  BP stable  # DVT prophylaxis - subcutaneous Heparin     Plan of care d/w wife Maggie at bedside ([phone]).  Plan d/c home pending Neurology followup.

## 2021-04-09 NOTE — DISCHARGE NOTE PROVIDER - NSDCCPCAREPLAN_GEN_ALL_CORE_FT
PRINCIPAL DISCHARGE DIAGNOSIS  Diagnosis: Brain TIA  Assessment and Plan of Treatment:       SECONDARY DISCHARGE DIAGNOSES  Diagnosis: Weakness  Assessment and Plan of Treatment:     Diagnosis: Altered mental status  Assessment and Plan of Treatment:     Diagnosis: Hypertension  Assessment and Plan of Treatment:     Diagnosis: Alzheimer disease  Assessment and Plan of Treatment:

## 2021-04-10 ENCOUNTER — TRANSCRIPTION ENCOUNTER (OUTPATIENT)
Age: 86
End: 2021-04-10

## 2021-04-10 VITALS
DIASTOLIC BLOOD PRESSURE: 68 MMHG | HEART RATE: 96 BPM | SYSTOLIC BLOOD PRESSURE: 109 MMHG | RESPIRATION RATE: 18 BRPM | OXYGEN SATURATION: 98 %

## 2021-04-10 LAB
ANION GAP SERPL CALC-SCNC: 3 MMOL/L — LOW (ref 5–17)
BUN SERPL-MCNC: 18 MG/DL — SIGNIFICANT CHANGE UP (ref 7–23)
CALCIUM SERPL-MCNC: 9.6 MG/DL — SIGNIFICANT CHANGE UP (ref 8.5–10.1)
CHLORIDE SERPL-SCNC: 108 MMOL/L — SIGNIFICANT CHANGE UP (ref 96–108)
CO2 SERPL-SCNC: 31 MMOL/L — SIGNIFICANT CHANGE UP (ref 22–31)
CREAT SERPL-MCNC: 0.86 MG/DL — SIGNIFICANT CHANGE UP (ref 0.5–1.3)
GLUCOSE SERPL-MCNC: 107 MG/DL — HIGH (ref 70–99)
POTASSIUM SERPL-MCNC: 4.2 MMOL/L — SIGNIFICANT CHANGE UP (ref 3.5–5.3)
POTASSIUM SERPL-SCNC: 4.2 MMOL/L — SIGNIFICANT CHANGE UP (ref 3.5–5.3)
SODIUM SERPL-SCNC: 142 MMOL/L — SIGNIFICANT CHANGE UP (ref 135–145)

## 2021-04-10 PROCEDURE — 99239 HOSP IP/OBS DSCHRG MGMT >30: CPT

## 2021-04-10 RX ADMIN — POLYETHYLENE GLYCOL 3350 17 GRAM(S): 17 POWDER, FOR SOLUTION ORAL at 11:26

## 2021-04-10 RX ADMIN — HEPARIN SODIUM 5000 UNIT(S): 5000 INJECTION INTRAVENOUS; SUBCUTANEOUS at 06:19

## 2021-04-10 RX ADMIN — Medication 81 MILLIGRAM(S): at 11:25

## 2021-04-10 RX ADMIN — AMLODIPINE BESYLATE 5 MILLIGRAM(S): 2.5 TABLET ORAL at 06:19

## 2021-04-10 NOTE — PROGRESS NOTE ADULT - ASSESSMENT
87yo M [facility] Alzheimer's Dementia presented with episode of unresponsiveness and confusion.  Admitted for evaluation, seen by Neurology    # Altered Mental Status - seen by neurology.  ? TIA.  Continue ASA, Statin.  MRI without acute infarct.  EEG-no seizure activity seen. Has been OOB with PT.   # Essential HTN - Monitor BP.  Continue oral antihypertensives.  BP stable    Plan of care d/w wife Maggie at bedside ([phone]).  Plan to follow up with Neurology- wife has card/follow up numbers provided to her

## 2021-04-10 NOTE — DISCHARGE NOTE NURSING/CASE MANAGEMENT/SOCIAL WORK - PATIENT PORTAL LINK FT
You can access the FollowMyHealth Patient Portal offered by [facility] by registering at the following website: [email or web address]. By joining [employer]’s FollowMyHealth portal, you will also be able to view your health information using other applications (apps) compatible with our system.

## 2021-04-10 NOTE — PROGRESS NOTE ADULT - SUBJECTIVE AND OBJECTIVE BOX
Patient is a 86y old  Male who presents with a chief complaint of Altered mental status r/o CVA (09 Apr 2021 16:18)      INTERVAL HPI/OVERNIGHT EVENTS: Pt doing well, no complaints, wife at bedside.       MEDICATIONS  (STANDING):  amLODIPine   Tablet 5 milliGRAM(s) Oral daily  aspirin enteric coated 81 milliGRAM(s) Oral daily  atorvastatin 40 milliGRAM(s) Oral at bedtime  heparin   Injectable 5000 Unit(s) SubCutaneous every 12 hours  polyethylene glycol 3350 17 Gram(s) Oral daily  senna 2 Tablet(s) Oral at bedtime    MEDICATIONS  (PRN):      Allergies    penicillin (Hives)    Intolerances        REVIEW OF SYSTEMS:  unreliable due to confusion    Vital Signs Last 24 Hrs  T(C): 36.3 (10 Apr 2021 10:15), Max: 36.7 (09 Apr 2021 17:08)  T(F): 97.4 (10 Apr 2021 10:15), Max: 98 (09 Apr 2021 17:08)  HR: 61 (10 Apr 2021 10:15) (61 - 80)  BP: 130/82 (10 Apr 2021 10:15) (122/74 - 154/85)  BP(mean): --  RR: 20 (10 Apr 2021 10:15) (17 - 20)  SpO2: 96% (10 Apr 2021 10:15) (96% - 98%)    PHYSICAL EXAM:  GENERAL:  NAD, Alert and Oriented x 1 to person   HEENT: NCAT  CARDIOVASCULAR:  S1, S2  LUNGS: CTAB  ABDOMEN:  soft, (-) tenderness, (-) distension, (+) bowel sounds, (-) guarding, (-) rebound (-) rigidity  EXTREMITIES:  no cyanosis / clubbing / edema.   NEURO: strength grossly symmetric, sensation intact.  Speech intact.     LABS:    04-10    142  |  108  |  18  ----------------------------<  107<H>  4.2   |  31  |  0.86    Ca    9.6      10 Apr 2021 12:44          CAPILLARY BLOOD GLUCOSE          RADIOLOGY & ADDITIONAL TESTS:    Imaging Personally Reviewed:  [ ] YES  [ ] NO    Consultant(s) Notes Reviewed:  [ ] YES  [ ] NO    Care Discussed with Consultants/Other Providers [ ] YES  [ ] NO

## 2021-04-16 DIAGNOSIS — I10 ESSENTIAL (PRIMARY) HYPERTENSION: ICD-10-CM

## 2021-04-16 DIAGNOSIS — F02.80 DEMENTIA IN OTHER DISEASES CLASSIFIED ELSEWHERE, UNSPECIFIED SEVERITY, WITHOUT BEHAVIORAL DISTURBANCE, PSYCHOTIC DISTURBANCE, MOOD DISTURBANCE, AND ANXIETY: ICD-10-CM

## 2021-04-16 DIAGNOSIS — R41.0 DISORIENTATION, UNSPECIFIED: ICD-10-CM

## 2021-04-16 DIAGNOSIS — F01.50 VASCULAR DEMENTIA WITHOUT BEHAVIORAL DISTURBANCE: ICD-10-CM

## 2021-04-16 DIAGNOSIS — G30.9 ALZHEIMER'S DISEASE, UNSPECIFIED: ICD-10-CM

## 2021-04-16 DIAGNOSIS — Z96.652 PRESENCE OF LEFT ARTIFICIAL KNEE JOINT: ICD-10-CM

## 2021-04-16 DIAGNOSIS — Z88.0 ALLERGY STATUS TO PENICILLIN: ICD-10-CM

## 2021-04-16 DIAGNOSIS — Z85.46 PERSONAL HISTORY OF MALIGNANT NEOPLASM OF PROSTATE: ICD-10-CM

## 2021-04-16 DIAGNOSIS — Z96.89 PRESENCE OF OTHER SPECIFIED FUNCTIONAL IMPLANTS: ICD-10-CM

## 2021-04-16 DIAGNOSIS — R42 DIZZINESS AND GIDDINESS: ICD-10-CM

## 2021-04-16 DIAGNOSIS — R53.1 WEAKNESS: ICD-10-CM

## 2021-06-10 ENCOUNTER — APPOINTMENT (OUTPATIENT)
Dept: UROLOGY | Facility: CLINIC | Age: 86
End: 2021-06-10
Payer: MEDICARE

## 2021-06-10 ENCOUNTER — OUTPATIENT (OUTPATIENT)
Dept: OUTPATIENT SERVICES | Facility: HOSPITAL | Age: 86
LOS: 1 days | End: 2021-06-10
Payer: MEDICARE

## 2021-06-10 DIAGNOSIS — Z96.0 PRESENCE OF UROGENITAL IMPLANTS: Chronic | ICD-10-CM

## 2021-06-10 DIAGNOSIS — Z98.89 OTHER SPECIFIED POSTPROCEDURAL STATES: Chronic | ICD-10-CM

## 2021-06-10 DIAGNOSIS — C61 MALIGNANT NEOPLASM OF PROSTATE: ICD-10-CM

## 2021-06-10 DIAGNOSIS — R35.0 FREQUENCY OF MICTURITION: ICD-10-CM

## 2021-06-10 DIAGNOSIS — Z96.652 PRESENCE OF LEFT ARTIFICIAL KNEE JOINT: Chronic | ICD-10-CM

## 2021-06-10 DIAGNOSIS — R97.20 ELEVATED PROSTATE SPECIFIC ANTIGEN [PSA]: ICD-10-CM

## 2021-06-10 PROCEDURE — 99213 OFFICE O/P EST LOW 20 MIN: CPT

## 2021-06-10 PROCEDURE — 96402 CHEMO HORMON ANTINEOPL SQ/IM: CPT

## 2021-06-11 LAB
PSA FREE FLD-MCNC: 7 %
PSA FREE SERPL-MCNC: 0.15 NG/ML
PSA SERPL-MCNC: 2.02 NG/ML

## 2021-06-24 NOTE — DISCHARGE NOTE NURSING/CASE MANAGEMENT/SOCIAL WORK - NSDCPEPTSTRK_GEN_ALL_CORE
Call 911 for stroke/Need for follow up after discharge/Prescribed medications/Risk factors for stroke/Stroke education booklet/Stroke support groups for patients, families, and friends/Stroke warning signs and symptoms/Signs and symptoms of stroke
show

## 2021-09-09 ENCOUNTER — OUTPATIENT (OUTPATIENT)
Dept: OUTPATIENT SERVICES | Facility: HOSPITAL | Age: 86
LOS: 1 days | End: 2021-09-09
Payer: MEDICARE

## 2021-09-09 ENCOUNTER — APPOINTMENT (OUTPATIENT)
Dept: UROLOGY | Facility: CLINIC | Age: 86
End: 2021-09-09
Payer: MEDICARE

## 2021-09-09 DIAGNOSIS — Z96.652 PRESENCE OF LEFT ARTIFICIAL KNEE JOINT: Chronic | ICD-10-CM

## 2021-09-09 DIAGNOSIS — R35.0 FREQUENCY OF MICTURITION: ICD-10-CM

## 2021-09-09 DIAGNOSIS — Z98.89 OTHER SPECIFIED POSTPROCEDURAL STATES: Chronic | ICD-10-CM

## 2021-09-09 DIAGNOSIS — Z96.0 PRESENCE OF UROGENITAL IMPLANTS: Chronic | ICD-10-CM

## 2021-09-09 DIAGNOSIS — C61 MALIGNANT NEOPLASM OF PROSTATE: ICD-10-CM

## 2021-09-09 PROCEDURE — 99213 OFFICE O/P EST LOW 20 MIN: CPT

## 2021-09-09 PROCEDURE — 96402 CHEMO HORMON ANTINEOPL SQ/IM: CPT

## 2021-12-15 ENCOUNTER — APPOINTMENT (OUTPATIENT)
Dept: UROLOGY | Facility: CLINIC | Age: 86
End: 2021-12-15
Payer: MEDICARE

## 2021-12-15 PROCEDURE — 99213 OFFICE O/P EST LOW 20 MIN: CPT

## 2021-12-16 LAB
PSA FREE FLD-MCNC: 8 %
PSA FREE SERPL-MCNC: 0.21 NG/ML
PSA SERPL-MCNC: 2.71 NG/ML

## 2022-02-03 ENCOUNTER — RX RENEWAL (OUTPATIENT)
Age: 87
End: 2022-02-03

## 2022-03-17 ENCOUNTER — APPOINTMENT (OUTPATIENT)
Dept: UROLOGY | Facility: CLINIC | Age: 87
End: 2022-03-17
Payer: MEDICARE

## 2022-03-17 PROCEDURE — 99213 OFFICE O/P EST LOW 20 MIN: CPT

## 2022-04-22 ENCOUNTER — RX RENEWAL (OUTPATIENT)
Age: 87
End: 2022-04-22

## 2022-06-23 ENCOUNTER — OUTPATIENT (OUTPATIENT)
Dept: OUTPATIENT SERVICES | Facility: HOSPITAL | Age: 87
LOS: 1 days | End: 2022-06-23
Payer: MEDICARE

## 2022-06-23 ENCOUNTER — APPOINTMENT (OUTPATIENT)
Dept: UROLOGY | Facility: CLINIC | Age: 87
End: 2022-06-23
Payer: MEDICARE

## 2022-06-23 VITALS — HEART RATE: 79 BPM | SYSTOLIC BLOOD PRESSURE: 125 MMHG | DIASTOLIC BLOOD PRESSURE: 70 MMHG

## 2022-06-23 DIAGNOSIS — Z98.89 OTHER SPECIFIED POSTPROCEDURAL STATES: Chronic | ICD-10-CM

## 2022-06-23 DIAGNOSIS — Z96.652 PRESENCE OF LEFT ARTIFICIAL KNEE JOINT: Chronic | ICD-10-CM

## 2022-06-23 DIAGNOSIS — R35.0 FREQUENCY OF MICTURITION: ICD-10-CM

## 2022-06-23 DIAGNOSIS — Z96.0 PRESENCE OF UROGENITAL IMPLANTS: Chronic | ICD-10-CM

## 2022-06-23 DIAGNOSIS — C61 MALIGNANT NEOPLASM OF PROSTATE: ICD-10-CM

## 2022-06-23 PROCEDURE — 99213 OFFICE O/P EST LOW 20 MIN: CPT

## 2022-06-23 PROCEDURE — 96402 CHEMO HORMON ANTINEOPL SQ/IM: CPT

## 2022-06-23 RX ORDER — LEUPROLIDE ACETATE 30 MG/.5ML
30 INJECTION, SUSPENSION, EXTENDED RELEASE SUBCUTANEOUS DAILY
Qty: 1 | Refills: 0 | Status: DISCONTINUED | OUTPATIENT
Start: 2020-11-10 | End: 2022-06-23

## 2022-06-23 RX ORDER — SULFAMETHOXAZOLE AND TRIMETHOPRIM 800; 160 MG/1; MG/1
800-160 TABLET ORAL TWICE DAILY
Qty: 20 | Refills: 0 | Status: DISCONTINUED | COMMUNITY
Start: 2017-03-06 | End: 2022-06-23

## 2022-06-23 RX ORDER — LEUPROLIDE ACETATE 30 MG/.5ML
30 INJECTION, SUSPENSION, EXTENDED RELEASE SUBCUTANEOUS DAILY
Qty: 1 | Refills: 0 | Status: DISCONTINUED | COMMUNITY
Start: 2020-11-02 | End: 2022-06-23

## 2022-06-24 LAB
PSA FREE FLD-MCNC: 7 %
PSA FREE SERPL-MCNC: 0.27 NG/ML
PSA SERPL-MCNC: 3.65 NG/ML

## 2022-07-22 ENCOUNTER — RX RENEWAL (OUTPATIENT)
Age: 87
End: 2022-07-22

## 2022-09-29 ENCOUNTER — APPOINTMENT (OUTPATIENT)
Dept: UROLOGY | Facility: CLINIC | Age: 87
End: 2022-09-29

## 2022-09-29 ENCOUNTER — OUTPATIENT (OUTPATIENT)
Dept: OUTPATIENT SERVICES | Facility: HOSPITAL | Age: 87
LOS: 1 days | End: 2022-09-29
Payer: MEDICARE

## 2022-09-29 VITALS
BODY MASS INDEX: 20 KG/M2 | HEIGHT: 68 IN | WEIGHT: 132 LBS | HEART RATE: 65 BPM | SYSTOLIC BLOOD PRESSURE: 149 MMHG | DIASTOLIC BLOOD PRESSURE: 87 MMHG

## 2022-09-29 DIAGNOSIS — Z96.652 PRESENCE OF LEFT ARTIFICIAL KNEE JOINT: Chronic | ICD-10-CM

## 2022-09-29 DIAGNOSIS — C61 MALIGNANT NEOPLASM OF PROSTATE: ICD-10-CM

## 2022-09-29 DIAGNOSIS — Z96.0 PRESENCE OF UROGENITAL IMPLANTS: Chronic | ICD-10-CM

## 2022-09-29 DIAGNOSIS — R35.0 FREQUENCY OF MICTURITION: ICD-10-CM

## 2022-09-29 DIAGNOSIS — R97.20 ELEVATED PROSTATE SPECIFIC ANTIGEN [PSA]: ICD-10-CM

## 2022-09-29 DIAGNOSIS — Z98.89 OTHER SPECIFIED POSTPROCEDURAL STATES: Chronic | ICD-10-CM

## 2022-09-29 PROCEDURE — 96402 CHEMO HORMON ANTINEOPL SQ/IM: CPT

## 2022-09-29 PROCEDURE — 99214 OFFICE O/P EST MOD 30 MIN: CPT

## 2022-09-29 RX ORDER — LEUPROLIDE ACETATE 22.5 MG
22.5 KIT INTRAMUSCULAR
Qty: 1 | Refills: 0 | Status: COMPLETED | OUTPATIENT
Start: 2022-09-29

## 2022-09-29 RX ADMIN — LEUPROLIDE ACETATE 0 MG: KIT at 00:00

## 2022-11-20 ENCOUNTER — INPATIENT (INPATIENT)
Facility: HOSPITAL | Age: 87
LOS: 7 days | Discharge: HOME | End: 2022-11-28
Attending: HOSPITALIST | Admitting: HOSPITALIST

## 2022-11-20 VITALS
TEMPERATURE: 98 F | SYSTOLIC BLOOD PRESSURE: 146 MMHG | HEIGHT: 69 IN | RESPIRATION RATE: 17 BRPM | HEART RATE: 88 BPM | WEIGHT: 136.91 LBS | OXYGEN SATURATION: 99 % | DIASTOLIC BLOOD PRESSURE: 81 MMHG

## 2022-11-20 DIAGNOSIS — Z98.89 OTHER SPECIFIED POSTPROCEDURAL STATES: Chronic | ICD-10-CM

## 2022-11-20 DIAGNOSIS — Z96.652 PRESENCE OF LEFT ARTIFICIAL KNEE JOINT: Chronic | ICD-10-CM

## 2022-11-20 DIAGNOSIS — Z96.0 PRESENCE OF UROGENITAL IMPLANTS: Chronic | ICD-10-CM

## 2022-11-20 LAB
ALBUMIN SERPL ELPH-MCNC: 3.3 G/DL — SIGNIFICANT CHANGE UP (ref 3.3–5)
ALP SERPL-CCNC: 111 U/L — SIGNIFICANT CHANGE UP (ref 40–120)
ALT FLD-CCNC: 11 U/L — LOW (ref 12–78)
ANION GAP SERPL CALC-SCNC: 6 MMOL/L — SIGNIFICANT CHANGE UP (ref 5–17)
APPEARANCE UR: CLEAR — SIGNIFICANT CHANGE UP
AST SERPL-CCNC: 14 U/L — LOW (ref 15–37)
BASOPHILS # BLD AUTO: 0.04 K/UL — SIGNIFICANT CHANGE UP (ref 0–0.2)
BASOPHILS NFR BLD AUTO: 0.3 % — SIGNIFICANT CHANGE UP (ref 0–2)
BILIRUB SERPL-MCNC: 0.8 MG/DL — SIGNIFICANT CHANGE UP (ref 0.2–1.2)
BILIRUB UR-MCNC: NEGATIVE — SIGNIFICANT CHANGE UP
BUN SERPL-MCNC: 20 MG/DL — SIGNIFICANT CHANGE UP (ref 7–23)
CALCIUM SERPL-MCNC: 9.5 MG/DL — SIGNIFICANT CHANGE UP (ref 8.5–10.1)
CHLORIDE SERPL-SCNC: 108 MMOL/L — SIGNIFICANT CHANGE UP (ref 96–108)
CO2 SERPL-SCNC: 27 MMOL/L — SIGNIFICANT CHANGE UP (ref 22–31)
COLOR SPEC: YELLOW — SIGNIFICANT CHANGE UP
CREAT SERPL-MCNC: 1.08 MG/DL — SIGNIFICANT CHANGE UP (ref 0.5–1.3)
DIFF PNL FLD: ABNORMAL
EGFR: 67 ML/MIN/1.73M2 — SIGNIFICANT CHANGE UP
EOSINOPHIL # BLD AUTO: 0.12 K/UL — SIGNIFICANT CHANGE UP (ref 0–0.5)
EOSINOPHIL NFR BLD AUTO: 1 % — SIGNIFICANT CHANGE UP (ref 0–6)
EPI CELLS # UR: SIGNIFICANT CHANGE UP
FLUAV AG NPH QL: SIGNIFICANT CHANGE UP
FLUBV AG NPH QL: SIGNIFICANT CHANGE UP
GLUCOSE SERPL-MCNC: 102 MG/DL — HIGH (ref 70–99)
GLUCOSE UR QL: NEGATIVE MG/DL — SIGNIFICANT CHANGE UP
HCT VFR BLD CALC: 43.4 % — SIGNIFICANT CHANGE UP (ref 39–50)
HGB BLD-MCNC: 13.2 G/DL — SIGNIFICANT CHANGE UP (ref 13–17)
IMM GRANULOCYTES NFR BLD AUTO: 0.4 % — SIGNIFICANT CHANGE UP (ref 0–0.9)
KETONES UR-MCNC: ABNORMAL
LEUKOCYTE ESTERASE UR-ACNC: ABNORMAL
LYMPHOCYTES # BLD AUTO: 16.9 % — SIGNIFICANT CHANGE UP (ref 13–44)
LYMPHOCYTES # BLD AUTO: 2.05 K/UL — SIGNIFICANT CHANGE UP (ref 1–3.3)
MAGNESIUM SERPL-MCNC: 2.3 MG/DL — SIGNIFICANT CHANGE UP (ref 1.6–2.6)
MCHC RBC-ENTMCNC: 25.9 PG — LOW (ref 27–34)
MCHC RBC-ENTMCNC: 30.4 G/DL — LOW (ref 32–36)
MCV RBC AUTO: 85.1 FL — SIGNIFICANT CHANGE UP (ref 80–100)
MONOCYTES # BLD AUTO: 0.86 K/UL — SIGNIFICANT CHANGE UP (ref 0–0.9)
MONOCYTES NFR BLD AUTO: 7.1 % — SIGNIFICANT CHANGE UP (ref 2–14)
NEUTROPHILS # BLD AUTO: 9.04 K/UL — HIGH (ref 1.8–7.4)
NEUTROPHILS NFR BLD AUTO: 74.3 % — SIGNIFICANT CHANGE UP (ref 43–77)
NITRITE UR-MCNC: NEGATIVE — SIGNIFICANT CHANGE UP
NRBC # BLD: 0 /100 WBCS — SIGNIFICANT CHANGE UP (ref 0–0)
NT-PROBNP SERPL-SCNC: 583 PG/ML — HIGH (ref 0–450)
PH UR: 5 — SIGNIFICANT CHANGE UP (ref 5–8)
PHOSPHATE SERPL-MCNC: 2.8 MG/DL — SIGNIFICANT CHANGE UP (ref 2.5–4.5)
PLATELET # BLD AUTO: 236 K/UL — SIGNIFICANT CHANGE UP (ref 150–400)
POTASSIUM SERPL-MCNC: 4.7 MMOL/L — SIGNIFICANT CHANGE UP (ref 3.5–5.3)
POTASSIUM SERPL-SCNC: 4.7 MMOL/L — SIGNIFICANT CHANGE UP (ref 3.5–5.3)
PROT SERPL-MCNC: 7 GM/DL — SIGNIFICANT CHANGE UP (ref 6–8.3)
PROT UR-MCNC: 15 MG/DL
RBC # BLD: 5.1 M/UL — SIGNIFICANT CHANGE UP (ref 4.2–5.8)
RBC # FLD: 16 % — HIGH (ref 10.3–14.5)
RBC CASTS # UR COMP ASSIST: SIGNIFICANT CHANGE UP /HPF (ref 0–4)
SARS-COV-2 RNA SPEC QL NAA+PROBE: SIGNIFICANT CHANGE UP
SODIUM SERPL-SCNC: 141 MMOL/L — SIGNIFICANT CHANGE UP (ref 135–145)
SP GR SPEC: 1.02 — SIGNIFICANT CHANGE UP (ref 1.01–1.02)
TROPONIN I, HIGH SENSITIVITY RESULT: 7.9 NG/L — SIGNIFICANT CHANGE UP
UROBILINOGEN FLD QL: NEGATIVE MG/DL — SIGNIFICANT CHANGE UP
WBC # BLD: 12.16 K/UL — HIGH (ref 3.8–10.5)
WBC # FLD AUTO: 12.16 K/UL — HIGH (ref 3.8–10.5)
WBC UR QL: SIGNIFICANT CHANGE UP

## 2022-11-20 PROCEDURE — 99284 EMERGENCY DEPT VISIT MOD MDM: CPT | Mod: FS

## 2022-11-20 PROCEDURE — 71045 X-RAY EXAM CHEST 1 VIEW: CPT | Mod: 26

## 2022-11-20 PROCEDURE — 70450 CT HEAD/BRAIN W/O DYE: CPT | Mod: 26,MA

## 2022-11-20 PROCEDURE — 93010 ELECTROCARDIOGRAM REPORT: CPT

## 2022-11-20 PROCEDURE — 99222 1ST HOSP IP/OBS MODERATE 55: CPT

## 2022-11-20 RX ORDER — HALOPERIDOL DECANOATE 100 MG/ML
5 INJECTION INTRAMUSCULAR ONCE
Refills: 0 | Status: COMPLETED | OUTPATIENT
Start: 2022-11-20 | End: 2022-11-20

## 2022-11-20 RX ORDER — DONEPEZIL HYDROCHLORIDE 10 MG/1
10 TABLET, FILM COATED ORAL AT BEDTIME
Refills: 0 | Status: DISCONTINUED | OUTPATIENT
Start: 2022-11-20 | End: 2022-11-23

## 2022-11-20 RX ORDER — DIPHENHYDRAMINE HCL 50 MG
50 CAPSULE ORAL ONCE
Refills: 0 | Status: COMPLETED | OUTPATIENT
Start: 2022-11-20 | End: 2022-11-20

## 2022-11-20 RX ORDER — QUETIAPINE FUMARATE 200 MG/1
25 TABLET, FILM COATED ORAL AT BEDTIME
Refills: 0 | Status: DISCONTINUED | OUTPATIENT
Start: 2022-11-20 | End: 2022-11-28

## 2022-11-20 RX ORDER — OLANZAPINE 15 MG/1
5 TABLET, FILM COATED ORAL ONCE
Refills: 0 | Status: COMPLETED | OUTPATIENT
Start: 2022-11-20 | End: 2022-11-20

## 2022-11-20 RX ORDER — MEMANTINE HYDROCHLORIDE 10 MG/1
10 TABLET ORAL AT BEDTIME
Refills: 0 | Status: DISCONTINUED | OUTPATIENT
Start: 2022-11-20 | End: 2022-11-23

## 2022-11-20 RX ADMIN — HALOPERIDOL DECANOATE 5 MILLIGRAM(S): 100 INJECTION INTRAMUSCULAR at 16:22

## 2022-11-20 RX ADMIN — OLANZAPINE 5 MILLIGRAM(S): 15 TABLET, FILM COATED ORAL at 20:01

## 2022-11-20 RX ADMIN — Medication 50 MILLIGRAM(S): at 20:01

## 2022-11-20 NOTE — ED PROVIDER NOTE - NSICDXPASTSURGICALHX_GEN_ALL_CORE_FT
PAST SURGICAL HISTORY:  History of knee replacement, total, left 1999, s/p revision 2001    History of prostate surgery 1992    Status post implantation of artificial urinary sphincter 1996

## 2022-11-20 NOTE — ED PROVIDER NOTE - NSICDXPASTMEDICALHX_GEN_ALL_CORE_FT
PAST MEDICAL HISTORY:  Fall 3/15/17, slipped on ice , injured left side of the body, denies broken bones, still with minor soreness.    [place] (hard of hearing) uses hearing aids PRN    Hypertension     Prostate ca 1992, Lupron Inj Q 3 months    Vertigo 2005 and 12/2016, uses meclizine PRN, last episode 12/2016

## 2022-11-20 NOTE — ED PROVIDER NOTE - NS ED ATTENDING STATEMENT MOD
This was a shared visit with the MATHIEU. I reviewed and verified the documentation and independently performed the documented:

## 2022-11-20 NOTE — ED PROVIDER NOTE - NS ED ROS FT
Per Wife:   Constitutional: (+) Anorexia, (+) Fatigue, (-) Fever, (-) Chills  Skin: (-) Rashes  Nose: (-) Nasal congestion, (-) Runny nose  Resp: (+) Cough, (-) Shortness of breath  GI: (-) Vomiting, (-) Diarrhea

## 2022-11-20 NOTE — ED PROVIDER NOTE - CLINICAL SUMMARY MEDICAL DECISION MAKING FREE TEXT BOX
87-year-old male with past medical history of hypertension, dementia/Alzheimer's who presents emergency room for urinary complaints and viral symptoms.  Sneezing for the last couple days followed by decreased appetite, dry cough, congestion and agitation x 2-3 days. +fatigue, lethargic. Denies fever, chills, vomiting, diarrhea, pain. Vital signs stable, chunky lungs, concern for PNA vs viral illness vs UTI, less likely ACS, CVA or CHF. Will get labs, CXR, CT head, EKG, reassess. Dispo pending results.

## 2022-11-20 NOTE — ED PROVIDER NOTE - ATTENDING APP SHARED VISIT CONTRIBUTION OF CARE
86yo male with pmh dementia, htn, presenting with wife for worsening confusion and uri type symptoms.  Past 2 days has had sneezing and dry cough.  Patient a&ox1 at baseline and is currently at baseline mental status but over past 2-3 days has been much more agitated.  Wife takes care of him alone and feels she has been unable to do so effectively over this time 2/2 agitation.  No fevers, resp distress, abd pain, vomiting.  Exam non focal.  Plan for labs, xr, urine, eval for infectious/ metabolic process.  No falls but will get ct eval for intracranial process.  Likely admit.

## 2022-11-20 NOTE — ED ADULT NURSE REASSESSMENT NOTE - NS ED NURSE REASSESS COMMENT FT1
Pt sleeping in bed, responsive to verbal and tactile stimuli. Pt placed on bedside cardiac monitoring. SB noted. EKG done and PA made ware. NAD noted at this time.

## 2022-11-20 NOTE — ED PROVIDER NOTE - OBJECTIVE STATEMENT
87-year-old male with past medical history of hypertension, dementia/Alzheimer's who presents emergency room for urinary complaints and viral symptoms.  Per wife, patient has been sneezing for the last couple days followed by decreased appetite, dry cough, congestion and agitation x 2-3 days.  Patient A&O x1 at baseline, has intermittent manic episodes which the wife initially thought that was patient now not allowing wife to change him. Wife concerned now for urinary tract infection or viral symptoms given change in mental status. Patient still walking and doing normal activities without issues but is sleeping more.  Denies fever, chills, vomiting, diarrhea, pain.

## 2022-11-20 NOTE — ED PROVIDER NOTE - PROGRESS NOTE DETAILS
SHAMIR Simons: all results other than UA discussed with wife, plan for admission regardless of UA results as wife cant take care of patient in current state. Will admit

## 2022-11-21 DIAGNOSIS — I10 ESSENTIAL (PRIMARY) HYPERTENSION: ICD-10-CM

## 2022-11-21 DIAGNOSIS — R41.82 ALTERED MENTAL STATUS, UNSPECIFIED: ICD-10-CM

## 2022-11-21 DIAGNOSIS — Z29.9 ENCOUNTER FOR PROPHYLACTIC MEASURES, UNSPECIFIED: ICD-10-CM

## 2022-11-21 DIAGNOSIS — G30.9 ALZHEIMER'S DISEASE, UNSPECIFIED: ICD-10-CM

## 2022-11-21 DIAGNOSIS — C61 MALIGNANT NEOPLASM OF PROSTATE: ICD-10-CM

## 2022-11-21 LAB
RAPID RVP RESULT: SIGNIFICANT CHANGE UP
SARS-COV-2 RNA SPEC QL NAA+PROBE: SIGNIFICANT CHANGE UP

## 2022-11-21 PROCEDURE — 99232 SBSQ HOSP IP/OBS MODERATE 35: CPT

## 2022-11-21 RX ORDER — HALOPERIDOL DECANOATE 100 MG/ML
2 INJECTION INTRAMUSCULAR ONCE
Refills: 0 | Status: COMPLETED | OUTPATIENT
Start: 2022-11-21 | End: 2022-11-21

## 2022-11-21 RX ORDER — AMLODIPINE BESYLATE 2.5 MG/1
2.5 TABLET ORAL DAILY
Refills: 0 | Status: DISCONTINUED | OUTPATIENT
Start: 2022-11-21 | End: 2022-11-28

## 2022-11-21 RX ORDER — ATORVASTATIN CALCIUM 80 MG/1
40 TABLET, FILM COATED ORAL AT BEDTIME
Refills: 0 | Status: DISCONTINUED | OUTPATIENT
Start: 2022-11-21 | End: 2022-11-28

## 2022-11-21 RX ORDER — DEXTROSE MONOHYDRATE, SODIUM CHLORIDE, AND POTASSIUM CHLORIDE 50; .745; 4.5 G/1000ML; G/1000ML; G/1000ML
1000 INJECTION, SOLUTION INTRAVENOUS
Refills: 0 | Status: DISCONTINUED | OUTPATIENT
Start: 2022-11-21 | End: 2022-11-27

## 2022-11-21 RX ORDER — HEPARIN SODIUM 5000 [USP'U]/ML
5000 INJECTION INTRAVENOUS; SUBCUTANEOUS EVERY 12 HOURS
Refills: 0 | Status: DISCONTINUED | OUTPATIENT
Start: 2022-11-21 | End: 2022-11-28

## 2022-11-21 RX ORDER — HALOPERIDOL DECANOATE 100 MG/ML
2 INJECTION INTRAMUSCULAR EVERY 8 HOURS
Refills: 0 | Status: DISCONTINUED | OUTPATIENT
Start: 2022-11-21 | End: 2022-11-28

## 2022-11-21 RX ORDER — CEFTRIAXONE 500 MG/1
1000 INJECTION, POWDER, FOR SOLUTION INTRAMUSCULAR; INTRAVENOUS EVERY 24 HOURS
Refills: 0 | Status: COMPLETED | OUTPATIENT
Start: 2022-11-21 | End: 2022-11-25

## 2022-11-21 RX ORDER — ASPIRIN/CALCIUM CARB/MAGNESIUM 324 MG
81 TABLET ORAL DAILY
Refills: 0 | Status: DISCONTINUED | OUTPATIENT
Start: 2022-11-21 | End: 2022-11-22

## 2022-11-21 RX ADMIN — HALOPERIDOL DECANOATE 2 MILLIGRAM(S): 100 INJECTION INTRAMUSCULAR at 16:38

## 2022-11-21 RX ADMIN — CEFTRIAXONE 100 MILLIGRAM(S): 500 INJECTION, POWDER, FOR SOLUTION INTRAMUSCULAR; INTRAVENOUS at 11:28

## 2022-11-21 RX ADMIN — DEXTROSE MONOHYDRATE, SODIUM CHLORIDE, AND POTASSIUM CHLORIDE 75 MILLILITER(S): 50; .745; 4.5 INJECTION, SOLUTION INTRAVENOUS at 11:30

## 2022-11-21 RX ADMIN — MEMANTINE HYDROCHLORIDE 10 MILLIGRAM(S): 10 TABLET ORAL at 22:02

## 2022-11-21 RX ADMIN — ATORVASTATIN CALCIUM 40 MILLIGRAM(S): 80 TABLET, FILM COATED ORAL at 22:03

## 2022-11-21 RX ADMIN — DONEPEZIL HYDROCHLORIDE 10 MILLIGRAM(S): 10 TABLET, FILM COATED ORAL at 22:02

## 2022-11-21 RX ADMIN — HEPARIN SODIUM 5000 UNIT(S): 5000 INJECTION INTRAVENOUS; SUBCUTANEOUS at 05:32

## 2022-11-21 RX ADMIN — AMLODIPINE BESYLATE 2.5 MILLIGRAM(S): 2.5 TABLET ORAL at 05:43

## 2022-11-21 RX ADMIN — HEPARIN SODIUM 5000 UNIT(S): 5000 INJECTION INTRAVENOUS; SUBCUTANEOUS at 17:25

## 2022-11-21 RX ADMIN — QUETIAPINE FUMARATE 25 MILLIGRAM(S): 200 TABLET, FILM COATED ORAL at 22:03

## 2022-11-21 RX ADMIN — Medication 81 MILLIGRAM(S): at 11:28

## 2022-11-21 NOTE — H&P ADULT - ASSESSMENT
Patient is an 87M with a PMH of dementia, HTN, prostate cancer s/p removal who presents to the ED for lethargy.  Patient currently sedated, received IV haldol and zyprexa in ED.  Unable to provide history.  Per ED staff who spoke to family member - patient noted to have increased lethargy and dry cough over the last 3 days.  Family also concerned as patient has had poor appetite and worsening agitations.  Wife feels as though she can no longer take care of him at home.  Patient reportedly AAOx1 at baseline, ambulates without difficulty.  Vitals stable, labs show mild leukocytosis.  Will admit to med surg.      IMPROVE VTE Individual Risk Assessment          RISK                                                          Points  [  ] Previous VTE                                                3  [  ] Thrombophilia                                             2  [  ] Lower limb paralysis                                    2        (unable to hold up >15 seconds)    [  ] Current Cancer                                             2         (within 6 months)  [  ] Immobilization > 24 hrs                              1  [  ] ICU/CCU stay > 24 hours                            1  [  ] Age > 60                                                    1    IMPROVE VTE Score - 1

## 2022-11-21 NOTE — PATIENT PROFILE ADULT - HISTORY OF COVID-19 VACCINATION
Percocet      Last Written Prescription Date:  1/19/17  Last Fill Quantity: 150,   # refills: 0  Last Office Visit with [facility] or  Health prescribing provider: 11/28/16 Dr. Michael    Future Office visit:       Routing refill request to provider for review/approval because:  Drug not on the [facility], [facility] or  Health refill protocol or controlled substance        [facility] [place])     Vaccine status unknown

## 2022-11-21 NOTE — H&P ADULT - PROBLEM SELECTOR PLAN 1
Unable to assess mental status in current state  Afebrile, mild leukocytosis.    UA negative, RVP pending  Family requesting long term care placement

## 2022-11-21 NOTE — PATIENT PROFILE ADULT - FALL HARM RISK - HARM RISK INTERVENTIONS
Assistance with ambulation/Assistance OOB with selected safe patient handling equipment/Communicate Risk of Fall with Harm to all staff/Discuss with provider need for PT consult/Monitor for mental status changes/Monitor gait and stability/Move patient closer to nurses' station/Reinforce activity limits and safety measures with patient and family/Reorient to person, place and time as needed/Tailored Fall Risk Interventions/Toileting schedule using arm’s reach rule for commode and bathroom/Use of alarms - bed, chair and/or voice tab/Visual Cue: Yellow wristband and red socks/Bed in lowest position, wheels locked, appropriate side rails in place/Call bell, personal items and telephone in reach/Instruct patient to call for assistance before getting out of bed or chair/Non-slip footwear when patient is out of bed/[place] to call system/Physically safe environment - no spills, clutter or unnecessary equipment/Purposeful Proactive Rounding/Room/bathroom lighting operational, light cord in reach

## 2022-11-21 NOTE — H&P ADULT - HISTORY OF PRESENT ILLNESS
Patient is an 87M with a PMH of dementia, HTN, prostate cancer s/p removal who presents to the ED for lethargy.  Patient currently sedated, received IV haldol and zyprexa in ED.  Unable to provide history.  Per ED staff who spoke to family member - patient noted to have increased lethargy and dry cough over the last 3 days.  Family also concerned as patient has had poor appetite and worsening agitations.  Wife feels as though she can no longer take care of him at home.  Patient reportedly AAOx1 at baseline, ambulates without difficulty.  Vitals stable, labs show mild leukocytosis.  Will admit to med surg.

## 2022-11-21 NOTE — H&P ADULT - NSICDXPASTMEDICALHX_GEN_ALL_CORE_FT
PAST MEDICAL HISTORY:  Fall 3/15/17, slipped on ice , injured left side of the body, denies broken bones, still with minor soreness.    [place] (hard of hearing) uses hearing aids PRN    Hypertension     Prostate ca 1992, Lupron Inj Q 3 months    Vertigo 2005 and 12/2016, uses meclizine PRN, last episode 12/2016

## 2022-11-21 NOTE — H&P ADULT - NSHPPHYSICALEXAM_GEN_ALL_CORE
Physical exam:  General: patient in no acute distress, resting comfortably  Head:  Atraumatic, Normocephalic  Eyes: EOMI, PERRLA, clear sclera  Neck: Supple, thyroid nontender, non enlarged  Cardio: S1/S2 +ve, regular rate and rhythm, no M/G/R  Resp: clear to ausculation bilaterally, no rales or wheezes  GI: abdomen soft, nontender, non distended, no guarding, BS +ve x 4  Ext: no significant pedal edema  Neuro: currently sedated   Skin: No rashes or lesions

## 2022-11-21 NOTE — H&P ADULT - NSHPLABSRESULTS_GEN_ALL_CORE
Recent Vitals  T(C): 36.6 (22 @ 23:55), Max: 36.9 (22 @ 11:47)  HR: 59 (22 @ 23:55) (53 - 88)  BP: 102/66 (22 @ 23:55) (102/66 - 152/84)  RR: 14 (22 @ 23:55) (14 - 19)  SpO2: 96% (22 @ 23:55) (95% - 99%)                        13.2   12.16 )-----------( 236      ( 2022 14:20 )             43.4         141  |  108  |  20  ----------------------------<  102<H>  4.7   |  27  |  1.08    Ca    9.5      2022 14:20  Phos  2.8       Mg     2.3         TPro  7.0  /  Alb  3.3  /  TBili  0.8  /  DBili  x   /  AST  14<L>  /  ALT  11<L>  /  AlkPhos  111  -20      LIVER FUNCTIONS - ( 2022 14:20 )  Alb: 3.3 g/dL / Pro: 7.0 gm/dL / ALK PHOS: 111 U/L / ALT: 11 U/L / AST: 14 U/L / GGT: x           Urinalysis Basic - ( 2022 20:41 )    Color: Yellow / Appearance: Clear / S.020 / pH: x  Gluc: x / Ketone: Trace  / Bili: Negative / Urobili: Negative mg/dL   Blood: x / Protein: 15 mg/dL / Nitrite: Negative   Leuk Esterase: Trace / RBC: 0-2 /HPF / WBC 3-5   Sq Epi: x / Non Sq Epi: Occasional / Bacteria: x        Home Medications:  amLODIPine 5 mg oral tablet: 1 tab(s) orally once a day (05 Dec 2017 21:59)  aspirin 81 mg oral delayed release tablet: 1 tab(s) orally once a day (2021 16:18)  Lupron Depot 22.5 mg/3 months intramuscular injection, extended release:  intramuscular every 3 month, last dose 17 (17 May 2017 06:05)

## 2022-11-22 LAB
ALBUMIN SERPL ELPH-MCNC: 2.8 G/DL — LOW (ref 3.3–5)
ALP SERPL-CCNC: 94 U/L — SIGNIFICANT CHANGE UP (ref 40–120)
ALT FLD-CCNC: 12 U/L — SIGNIFICANT CHANGE UP (ref 12–78)
ANION GAP SERPL CALC-SCNC: 8 MMOL/L — SIGNIFICANT CHANGE UP (ref 5–17)
AST SERPL-CCNC: 17 U/L — SIGNIFICANT CHANGE UP (ref 15–37)
BILIRUB SERPL-MCNC: 0.8 MG/DL — SIGNIFICANT CHANGE UP (ref 0.2–1.2)
BUN SERPL-MCNC: 18 MG/DL — SIGNIFICANT CHANGE UP (ref 7–23)
CALCIUM SERPL-MCNC: 8.7 MG/DL — SIGNIFICANT CHANGE UP (ref 8.5–10.1)
CHLORIDE SERPL-SCNC: 110 MMOL/L — HIGH (ref 96–108)
CO2 SERPL-SCNC: 24 MMOL/L — SIGNIFICANT CHANGE UP (ref 22–31)
CREAT SERPL-MCNC: 0.85 MG/DL — SIGNIFICANT CHANGE UP (ref 0.5–1.3)
CULTURE RESULTS: NO GROWTH — SIGNIFICANT CHANGE UP
EGFR: 85 ML/MIN/1.73M2 — SIGNIFICANT CHANGE UP
GLUCOSE SERPL-MCNC: 119 MG/DL — HIGH (ref 70–99)
HCT VFR BLD CALC: 39.5 % — SIGNIFICANT CHANGE UP (ref 39–50)
HGB BLD-MCNC: 12.4 G/DL — LOW (ref 13–17)
MAGNESIUM SERPL-MCNC: 2.1 MG/DL — SIGNIFICANT CHANGE UP (ref 1.6–2.6)
MCHC RBC-ENTMCNC: 26.2 PG — LOW (ref 27–34)
MCHC RBC-ENTMCNC: 31.4 G/DL — LOW (ref 32–36)
MCV RBC AUTO: 83.5 FL — SIGNIFICANT CHANGE UP (ref 80–100)
NRBC # BLD: 0 /100 WBCS — SIGNIFICANT CHANGE UP (ref 0–0)
PHOSPHATE SERPL-MCNC: 2.7 MG/DL — SIGNIFICANT CHANGE UP (ref 2.5–4.5)
PLATELET # BLD AUTO: 227 K/UL — SIGNIFICANT CHANGE UP (ref 150–400)
POTASSIUM SERPL-MCNC: 3.8 MMOL/L — SIGNIFICANT CHANGE UP (ref 3.5–5.3)
POTASSIUM SERPL-SCNC: 3.8 MMOL/L — SIGNIFICANT CHANGE UP (ref 3.5–5.3)
PROCALCITONIN SERPL-MCNC: 0.09 NG/ML — SIGNIFICANT CHANGE UP (ref 0.02–0.1)
PROT SERPL-MCNC: 5.8 GM/DL — LOW (ref 6–8.3)
RBC # BLD: 4.73 M/UL — SIGNIFICANT CHANGE UP (ref 4.2–5.8)
RBC # FLD: 15.9 % — HIGH (ref 10.3–14.5)
SODIUM SERPL-SCNC: 142 MMOL/L — SIGNIFICANT CHANGE UP (ref 135–145)
SPECIMEN SOURCE: SIGNIFICANT CHANGE UP
WBC # BLD: 9.56 K/UL — SIGNIFICANT CHANGE UP (ref 3.8–10.5)
WBC # FLD AUTO: 9.56 K/UL — SIGNIFICANT CHANGE UP (ref 3.8–10.5)

## 2022-11-22 PROCEDURE — 99232 SBSQ HOSP IP/OBS MODERATE 35: CPT

## 2022-11-22 PROCEDURE — 90792 PSYCH DIAG EVAL W/MED SRVCS: CPT

## 2022-11-22 RX ORDER — ASPIRIN/CALCIUM CARB/MAGNESIUM 324 MG
81 TABLET ORAL DAILY
Refills: 0 | Status: DISCONTINUED | OUTPATIENT
Start: 2022-11-22 | End: 2022-11-28

## 2022-11-22 RX ORDER — QUETIAPINE FUMARATE 200 MG/1
12.5 TABLET, FILM COATED ORAL THREE TIMES A DAY
Refills: 0 | Status: DISCONTINUED | OUTPATIENT
Start: 2022-11-22 | End: 2022-11-28

## 2022-11-22 RX ADMIN — HEPARIN SODIUM 5000 UNIT(S): 5000 INJECTION INTRAVENOUS; SUBCUTANEOUS at 17:26

## 2022-11-22 RX ADMIN — Medication 81 MILLIGRAM(S): at 12:33

## 2022-11-22 RX ADMIN — ATORVASTATIN CALCIUM 40 MILLIGRAM(S): 80 TABLET, FILM COATED ORAL at 21:46

## 2022-11-22 RX ADMIN — QUETIAPINE FUMARATE 25 MILLIGRAM(S): 200 TABLET, FILM COATED ORAL at 21:46

## 2022-11-22 RX ADMIN — AMLODIPINE BESYLATE 2.5 MILLIGRAM(S): 2.5 TABLET ORAL at 06:51

## 2022-11-22 RX ADMIN — HEPARIN SODIUM 5000 UNIT(S): 5000 INJECTION INTRAVENOUS; SUBCUTANEOUS at 06:51

## 2022-11-22 RX ADMIN — DONEPEZIL HYDROCHLORIDE 10 MILLIGRAM(S): 10 TABLET, FILM COATED ORAL at 21:46

## 2022-11-22 RX ADMIN — CEFTRIAXONE 100 MILLIGRAM(S): 500 INJECTION, POWDER, FOR SOLUTION INTRAMUSCULAR; INTRAVENOUS at 11:37

## 2022-11-22 RX ADMIN — MEMANTINE HYDROCHLORIDE 10 MILLIGRAM(S): 10 TABLET ORAL at 21:46

## 2022-11-22 RX ADMIN — DEXTROSE MONOHYDRATE, SODIUM CHLORIDE, AND POTASSIUM CHLORIDE 75 MILLILITER(S): 50; .745; 4.5 INJECTION, SOLUTION INTRAVENOUS at 00:51

## 2022-11-22 NOTE — BH CONSULTATION LIAISON ASSESSMENT NOTE - SUMMARY
88 yo male with advancing Alzheimer's Dementia with need-based Behavioral Disturbances, minimal verbal ability hence not able to communicate his needs, during ADL assistance, already on Aricept 10mg PO qhs and Namenda 10mg PO qd, + Seroquel 25mg PO qhs.

## 2022-11-22 NOTE — PHARMACOTHERAPY INTERVENTION NOTE - COMMENTS
Updated penicillin allergy to state that pt tolerated cephalexin in 2017 and ceftriaxone on this admission with no documented reaction.

## 2022-11-22 NOTE — PHYSICAL THERAPY INITIAL EVALUATION ADULT - GAIT TRAINING, PT EVAL
Pt will be able to ambulate using assistive device up to 50 ft or more, be able to negotiate 4 steps safely observing proper gait, posture and prevent falls.

## 2022-11-22 NOTE — BH CONSULTATION LIAISON ASSESSMENT NOTE - NSBHCHARTREVIEWVS_PSY_A_CORE FT
Vital Signs Last 24 Hrs  T(C): 36.6 (22 Nov 2022 11:46), Max: 36.6 (22 Nov 2022 11:46)  T(F): 97.8 (22 Nov 2022 11:46), Max: 97.8 (22 Nov 2022 11:46)  HR: 78 (22 Nov 2022 11:46) (62 - 101)  BP: 126/79 (22 Nov 2022 11:46) (109/67 - 134/84)  BP(mean): --  RR: 18 (22 Nov 2022 11:46) (18 - 18)  SpO2: 97% (22 Nov 2022 11:46) (94% - 98%)    Parameters below as of 22 Nov 2022 11:46  Patient On (Oxygen Delivery Method): room air

## 2022-11-22 NOTE — BH CONSULTATION LIAISON ASSESSMENT NOTE - OTHER
not establishing meaningful eye contact and with minimal response even to his name being called.  adequate  does not look distressed or uncomfortable  deferred at this time  impaired

## 2022-11-22 NOTE — BH CONSULTATION LIAISON ASSESSMENT NOTE - HPI (INCLUDE ILLNESS QUALITY, SEVERITY, DURATION, TIMING, CONTEXT, MODIFYING FACTORS, ASSOCIATED SIGNS AND SYMPTOMS)
PMH of [place] (stress urinary incontinence) s/p Artificial urinary sphincter  05/17/2017; Alzheimer's Dementia with need-based Behavioral Disturbances during ADL assistance, on Aricept 10mg PO qhs and Namenda 10mg PO qd, + Seroquel 25mg PO qhs, (MRI brain: global atrophy, chronic ischemic white matter changes & EEG  moderate diffuse cerebral dysfunction 04/2018),  who presented from home on 11/20/22 fr urinary complaints and viral symptoms.  Per wife, patient has been sneezing for the last couple days followed by decreased appetite, dry cough, congestion and agitation x 2-3 days. Xray Chest (11.20.22) New right basilar opacity, possibly a small right pleural effusion extending into the major fissure with likely associated passive atelectasis.          88 yo  male, , noncaregiver, retired, domiciled with family, with advancing Alzheimer's Dementia with need-based Behavioral Disturbances during ADL assistance, on Aricept 10mg PO qhs and Namenda 10mg PO qd, + Seroquel 25mg PO qhs, (MRI brain: global atrophy, chronic ischemic white matter changes & EEG  moderate diffuse cerebral dysfunction 04/2018), PMH of WINSTON (stress urinary incontinence) s/p Artificial urinary sphincter  05/17/2017, who presented from home on 11/20/22 fr urinary complaints and viral symptoms.  Per wife, patient has been sneezing for the last couple days followed by decreased appetite, dry cough, congestion and agitation x 2-3 days. Xray Chest (11.20.22) New right basilar opacity, possibly a small right pleural effusion extending into the major fissure with likely associated passive atelectasis.     EXAM: calm, cooperative, sitting in his bed looking at Writer but not establishing meaningful eye contact and with minimal response even to his name being called. Unable to follow simple verbal commands. Simple passive commands such as Writer extending her hand towards Patient's hand and he allows her to check hi s ID tag. Not able to verbalize his needs, not able to engage in any meaningful verbal or non-verbal manner. Mumbles sporadic words at times. Writer turned on the TV for him with no response. Constricted affect with minimal reactivity. However, does not looks to be in pain or distress.

## 2022-11-22 NOTE — BH CONSULTATION LIAISON ASSESSMENT NOTE - CURRENT MEDICATION
MEDICATIONS  (STANDING):  amLODIPine   Tablet 2.5 milliGRAM(s) Oral daily  aspirin  chewable 81 milliGRAM(s) Oral daily  atorvastatin 40 milliGRAM(s) Oral at bedtime  cefTRIAXone   IVPB 1000 milliGRAM(s) IV Intermittent every 24 hours  dextrose 5% + sodium chloride 0.45% with potassium chloride 20 mEq/L 1000 milliLiter(s) (75 mL/Hr) IV Continuous <Continuous>  donepezil 10 milliGRAM(s) Oral at bedtime  heparin   Injectable 5000 Unit(s) SubCutaneous every 12 hours  memantine 10 milliGRAM(s) Oral at bedtime  QUEtiapine 25 milliGRAM(s) Oral at bedtime    MEDICATIONS  (PRN):  haloperidol    Injectable 2 milliGRAM(s) IntraMuscular every 8 hours PRN agitation

## 2022-11-22 NOTE — PHYSICAL THERAPY INITIAL EVALUATION ADULT - AMBULATION SKILLS, REHAB EVAL
Until 11/18/22 pt was able to walk spontaneously in home environement but since 11/19/22  his walking ability declined and behavior problems incleased. Pt staye din bed on  11/19-11/20  and then came to [facility] on 11/20/22 via ambulance. Pt has been  bed bound since then/needed assist (1) Other Diagnosis

## 2022-11-22 NOTE — BH CONSULTATION LIAISON ASSESSMENT NOTE - RISK ASSESSMENT
low risk for intentional, planned out and executed harm to self or others given advanced age, physical frailty and multi-domain cognitive deficits. More likely to unintentionally/accidentally lash out at caretakers during ADL assistance or hurt self by trying to climb out of bed/pulls lines etc secondary to chronically confused state.

## 2022-11-22 NOTE — PHYSICAL THERAPY INITIAL EVALUATION ADULT - ADDITIONAL COMMENTS
Lives with wife in a  with 4 steps to enter from back entrance  c 2 closeby handrails. Bed/bath on same level on 1st floor. Pt wears diapers/pull ups 24 hr/day. Wife uses adult wipes to clean him as giving shower alone I snot easy for her. Pt is generally never left alone. If wife has to go for some errands, then she does them when patient is sleeping. Per wife pt sleeps soundly in the morning and does not wake up unless woken up by wife. Wife does al IADLS and helps with most BADLS - toileting, dressing, feeding . Some days when pt is less confused then he can participate partially in BADLs.

## 2022-11-22 NOTE — BH CONSULTATION LIAISON ASSESSMENT NOTE - NSBHCONSULTRECOMMENDOTHER_PSY_A_CORE FT
continue Aricept 10mg PO qhs and Namenda 10mg PO qd, + Seroquel 25mg PO qhs for now  - trying Seroquel 12.5mg PO 30 minutes prior to ADL assistance   - Patient has not been agitated at VS thus far and was able to tolerate PT assessment today  - agitation at home also could have been due to acute medical condition (pneumonia); Patient was not feeling well and engaged in the only mode of communication he is able to   - no capacity to make his medical decisions; spouse is decision maker

## 2022-11-22 NOTE — PHYSICAL THERAPY INITIAL EVALUATION ADULT - GENERAL OBSERVATIONS, REHAB EVAL
Pt recd supine NAD. Able to establish eye contact and maintain it for 4-5 sec. + confusion. Disoriented x 4. Able to follow 1 step commands with  verbal ,& tactile cues 10% of the times. Tendency to resist  participation 2* confusion and then gets agitated. Redirected throughout to elicit participation.  +Lara

## 2022-11-22 NOTE — PHYSICAL THERAPY INITIAL EVALUATION ADULT - TRANSFER TRAINING, PT EVAL
Supervision  in  transfer ability bed to chair and vice versa using appropriate assistive device  and prevent falls.

## 2022-11-23 LAB
ALBUMIN SERPL ELPH-MCNC: 2.6 G/DL — LOW (ref 3.3–5)
ALP SERPL-CCNC: 93 U/L — SIGNIFICANT CHANGE UP (ref 40–120)
ALT FLD-CCNC: 30 U/L — SIGNIFICANT CHANGE UP (ref 12–78)
ANION GAP SERPL CALC-SCNC: 6 MMOL/L — SIGNIFICANT CHANGE UP (ref 5–17)
AST SERPL-CCNC: 34 U/L — SIGNIFICANT CHANGE UP (ref 15–37)
BASOPHILS # BLD AUTO: 0.03 K/UL — SIGNIFICANT CHANGE UP (ref 0–0.2)
BASOPHILS NFR BLD AUTO: 0.4 % — SIGNIFICANT CHANGE UP (ref 0–2)
BILIRUB SERPL-MCNC: 0.4 MG/DL — SIGNIFICANT CHANGE UP (ref 0.2–1.2)
BUN SERPL-MCNC: 20 MG/DL — SIGNIFICANT CHANGE UP (ref 7–23)
CALCIUM SERPL-MCNC: 8.8 MG/DL — SIGNIFICANT CHANGE UP (ref 8.5–10.1)
CHLORIDE SERPL-SCNC: 110 MMOL/L — HIGH (ref 96–108)
CO2 SERPL-SCNC: 24 MMOL/L — SIGNIFICANT CHANGE UP (ref 22–31)
CREAT SERPL-MCNC: 0.84 MG/DL — SIGNIFICANT CHANGE UP (ref 0.5–1.3)
EGFR: 85 ML/MIN/1.73M2 — SIGNIFICANT CHANGE UP
EOSINOPHIL # BLD AUTO: 0.42 K/UL — SIGNIFICANT CHANGE UP (ref 0–0.5)
EOSINOPHIL NFR BLD AUTO: 5.4 % — SIGNIFICANT CHANGE UP (ref 0–6)
GLUCOSE SERPL-MCNC: 93 MG/DL — SIGNIFICANT CHANGE UP (ref 70–99)
HCT VFR BLD CALC: 39.7 % — SIGNIFICANT CHANGE UP (ref 39–50)
HGB BLD-MCNC: 12.4 G/DL — LOW (ref 13–17)
IMM GRANULOCYTES NFR BLD AUTO: 0.4 % — SIGNIFICANT CHANGE UP (ref 0–0.9)
LYMPHOCYTES # BLD AUTO: 1.41 K/UL — SIGNIFICANT CHANGE UP (ref 1–3.3)
LYMPHOCYTES # BLD AUTO: 18.3 % — SIGNIFICANT CHANGE UP (ref 13–44)
MAGNESIUM SERPL-MCNC: 2.1 MG/DL — SIGNIFICANT CHANGE UP (ref 1.6–2.6)
MCHC RBC-ENTMCNC: 26.3 PG — LOW (ref 27–34)
MCHC RBC-ENTMCNC: 31.2 G/DL — LOW (ref 32–36)
MCV RBC AUTO: 84.1 FL — SIGNIFICANT CHANGE UP (ref 80–100)
MONOCYTES # BLD AUTO: 0.74 K/UL — SIGNIFICANT CHANGE UP (ref 0–0.9)
MONOCYTES NFR BLD AUTO: 9.6 % — SIGNIFICANT CHANGE UP (ref 2–14)
NEUTROPHILS # BLD AUTO: 5.08 K/UL — SIGNIFICANT CHANGE UP (ref 1.8–7.4)
NEUTROPHILS NFR BLD AUTO: 65.9 % — SIGNIFICANT CHANGE UP (ref 43–77)
NRBC # BLD: 0 /100 WBCS — SIGNIFICANT CHANGE UP (ref 0–0)
PHOSPHATE SERPL-MCNC: 3.4 MG/DL — SIGNIFICANT CHANGE UP (ref 2.5–4.5)
PLATELET # BLD AUTO: 244 K/UL — SIGNIFICANT CHANGE UP (ref 150–400)
POTASSIUM SERPL-MCNC: 3.8 MMOL/L — SIGNIFICANT CHANGE UP (ref 3.5–5.3)
POTASSIUM SERPL-SCNC: 3.8 MMOL/L — SIGNIFICANT CHANGE UP (ref 3.5–5.3)
PROT SERPL-MCNC: 6 GM/DL — SIGNIFICANT CHANGE UP (ref 6–8.3)
RBC # BLD: 4.72 M/UL — SIGNIFICANT CHANGE UP (ref 4.2–5.8)
RBC # FLD: 16 % — HIGH (ref 10.3–14.5)
SODIUM SERPL-SCNC: 140 MMOL/L — SIGNIFICANT CHANGE UP (ref 135–145)
WBC # BLD: 7.71 K/UL — SIGNIFICANT CHANGE UP (ref 3.8–10.5)
WBC # FLD AUTO: 7.71 K/UL — SIGNIFICANT CHANGE UP (ref 3.8–10.5)

## 2022-11-23 PROCEDURE — 99231 SBSQ HOSP IP/OBS SF/LOW 25: CPT

## 2022-11-23 PROCEDURE — 99222 1ST HOSP IP/OBS MODERATE 55: CPT

## 2022-11-23 RX ORDER — DONEPEZIL HYDROCHLORIDE 10 MG/1
15 TABLET, FILM COATED ORAL AT BEDTIME
Refills: 0 | Status: DISCONTINUED | OUTPATIENT
Start: 2022-11-23 | End: 2022-11-28

## 2022-11-23 RX ORDER — MEMANTINE HYDROCHLORIDE 10 MG/1
10 TABLET ORAL DAILY
Refills: 0 | Status: DISCONTINUED | OUTPATIENT
Start: 2022-11-23 | End: 2022-11-28

## 2022-11-23 RX ADMIN — DEXTROSE MONOHYDRATE, SODIUM CHLORIDE, AND POTASSIUM CHLORIDE 75 MILLILITER(S): 50; .745; 4.5 INJECTION, SOLUTION INTRAVENOUS at 14:19

## 2022-11-23 RX ADMIN — HEPARIN SODIUM 5000 UNIT(S): 5000 INJECTION INTRAVENOUS; SUBCUTANEOUS at 17:52

## 2022-11-23 RX ADMIN — HEPARIN SODIUM 5000 UNIT(S): 5000 INJECTION INTRAVENOUS; SUBCUTANEOUS at 06:01

## 2022-11-23 RX ADMIN — QUETIAPINE FUMARATE 25 MILLIGRAM(S): 200 TABLET, FILM COATED ORAL at 21:14

## 2022-11-23 RX ADMIN — DONEPEZIL HYDROCHLORIDE 15 MILLIGRAM(S): 10 TABLET, FILM COATED ORAL at 21:15

## 2022-11-23 RX ADMIN — ATORVASTATIN CALCIUM 40 MILLIGRAM(S): 80 TABLET, FILM COATED ORAL at 21:15

## 2022-11-23 RX ADMIN — Medication 81 MILLIGRAM(S): at 12:24

## 2022-11-23 RX ADMIN — AMLODIPINE BESYLATE 2.5 MILLIGRAM(S): 2.5 TABLET ORAL at 06:01

## 2022-11-23 RX ADMIN — MEMANTINE HYDROCHLORIDE 10 MILLIGRAM(S): 10 TABLET ORAL at 17:52

## 2022-11-23 RX ADMIN — CEFTRIAXONE 100 MILLIGRAM(S): 500 INJECTION, POWDER, FOR SOLUTION INTRAMUSCULAR; INTRAVENOUS at 12:21

## 2022-11-23 NOTE — DIETITIAN INITIAL EVALUATION ADULT - OTHER INFO
Pt with PMH dementia, HTN, prostate cancer (on Lupron); family present at bedside to provide information regarding pt's diet and wt hx.  Pt lives with spouse who does food shopping/cooking; pt uses rolling walker, requires assistance of spouse for ADLs.  Pt with variable appetite PTA, however decreased appetite and increased lethargy x 3 days PTA. No dietary restrictions at home; preferences include egg sandwiches, pasta with broccoli. Appears appetite has improved, po intake >75% during admission; tolerating/managing easy to chew consistency well; assistance with meals provided. Family amenable to Ensure nutrition supplement for additional kcal & protein.  Pt's  lbs. x many years ago; family acknowledges gradual wt loss x years.

## 2022-11-23 NOTE — BH CONSULTATION LIAISON PROGRESS NOTE - NSBHCONSULTRECOMMENDOTHER_PSY_A_CORE FT
Writer called pt's pharmacy at [facility] [phone]: Neurologist Arvin Espinoza MD prescribes the Aricept which was started as the orally dissolvable tab on 09/2020 and changed to the regular tab on 06/2021. Kept at 10mg. Family Medicine physician Rebecca Bustos MD prescribes the Namenda which was started at 5mg on 02/2021 and increased to 10mg on 09/22.  - increase Aricept from 10mg PO qhs to 15mg PO qhs (has not been increased in 1 year); continue Namenda 10mg PO qhs as it was just increased last month Writer called pt's pharmacy at [facility] [phone]: Neurologist Arvin Espinoza MD prescribes the Aricept which was started as the orally dissolvable tab on 09/2020 and changed to the regular tab on 06/2021. Kept at 10mg. Family Medicine physician Rebecca Bustos MD prescribes the Namenda which was started at 5mg on 02/2021 and increased to 10mg on 09/22.  - increase Aricept from 10mg PO qhs to 15mg PO qhs (has not been increased in 1 year; discussed with Pt's spouse Maggie); continue Namenda 10mg PO qhs as it was just increased last month  - Patient does not have capacity to make his medical decisions; decision-maker is his wife Maggie

## 2022-11-23 NOTE — BH CONSULTATION LIAISON PROGRESS NOTE - NSBHFUPINTERVALHXFT_PSY_A_CORE
Patient did not need any PRNs since last seen; calm, cooperative, no combative behavior. Medical compliant. Same clinical presentation which is highly likely Pt's baseline. Writer called pt's pharmacy at [facility] [phone]: Neurologist Arvin Espinoza MD prescribes the Aricept which was started as the orally dissolvable tab on 09/2020 and changed to the regular tab on 06/2021. Kept at 10mg. Family Medicine physician Rebecca Bustos MD prescribes the Namenda which was started at 5mg on 02/2021 and increased to 10mg on 09/22.  Patient did not need any PRNs since last seen; calm, cooperative, no combative behavior. Medical compliant. Same clinical presentation which is highly likely Pt's baseline. Writer called pt's pharmacy at [facility] [phone]: Neurologist Arvin Espinoza MD prescribes the Aricept which was started as the orally dissolvable tab on 09/2020 and changed to the regular tab on 06/2021. Kept at 10mg. Family Medicine physician Rebecca Bustos MD prescribes the Namenda which was started at 5mg on 02/2021 and increased to 10mg on 09/22. COLLATERAL FROM WIFE HORTENSIA (at bedside): Patient sleeps well on the Seroquel, has a few nights here and there where he just wonders around, but does not leave the house. His appetite got better on the Namenda. Confirms recent dose increase and also that Aricept was not dose adjusted in a year. Agreeable to trying Aricept 15mg PO for Patient. Confirms Patient is back to his baseline at this time.

## 2022-11-23 NOTE — DIETITIAN INITIAL EVALUATION ADULT - PERTINENT MEDS FT
MEDICATIONS  (STANDING):  amLODIPine   Tablet 2.5 milliGRAM(s) Oral daily  aspirin  chewable 81 milliGRAM(s) Oral daily  atorvastatin 40 milliGRAM(s) Oral at bedtime  cefTRIAXone   IVPB 1000 milliGRAM(s) IV Intermittent every 24 hours  dextrose 5% + sodium chloride 0.45% with potassium chloride 20 mEq/L 1000 milliLiter(s) (75 mL/Hr) IV Continuous <Continuous>  donepezil 15 milliGRAM(s) Oral at bedtime  heparin   Injectable 5000 Unit(s) SubCutaneous every 12 hours  memantine 10 milliGRAM(s) Oral daily  QUEtiapine 25 milliGRAM(s) Oral at bedtime    MEDICATIONS  (PRN):  haloperidol    Injectable 2 milliGRAM(s) IntraMuscular every 8 hours PRN agitation  QUEtiapine 12.5 milliGRAM(s) Oral three times a day PRN mild agitation / give 30 minutes prior to ADL assistance

## 2022-11-23 NOTE — DIETITIAN INITIAL EVALUATION ADULT - NSICDXPASTMEDICALHX_GEN_ALL_CORE_FT
PAST MEDICAL HISTORY:  Fall 3/15/17, slipped on ice , injured left side of the body, denies broken bones, still with minor soreness.    [place] (hard of hearing) uses hearing aids PRN    Hypertension     Prostate ca 1992, Lupron Inj Q 3 months    Vertigo 2005 and 12/2016, uses meclizine PRN, last episode 12/2016

## 2022-11-23 NOTE — BH CONSULTATION LIAISON PROGRESS NOTE - OTHER
impaired  does not look distressed or uncomfortable  not establishing meaningful eye contact and with minimal response even to his name being called.  deferred at this time  adequate  impaired / very limited

## 2022-11-23 NOTE — DIETITIAN INITIAL EVALUATION ADULT - NSFNSGIIOFT_GEN_A_CORE
11-22-22 @ 07:01  -  11-23-22 @ 07:00  --------------------------------------------------------  OUT:    Stool (mL): 0 mL  Total OUT: 0 mL    Total NET: 0 mL

## 2022-11-23 NOTE — DIETITIAN NUTRITION RISK NOTIFICATION - TREATMENT: THE FOLLOWING DIET HAS BEEN RECOMMENDED
Diet, Easy to Chew:   Low Sodium  Supplement Feeding Modality:  Oral  Ensure Enlive Cans or Servings Per Day:  1       Frequency:  Two Times a day (11-23-22 @ 13:47) [Pending Verification By Attending]  Diet, DASH/TLC:   Sodium & Cholesterol Restricted  Easy to Chew (EASYTOCHEW) (11-21-22 @ 00:18) [Active]

## 2022-11-23 NOTE — DIETITIAN INITIAL EVALUATION ADULT - PERTINENT LABORATORY DATA
11-23    140  |  110<H>  |  20  ----------------------------<  93  3.8   |  24  |  0.84    Ca    8.8      23 Nov 2022 05:45  Phos  3.4     11-23  Mg     2.1     11-23    TPro  6.0  /  Alb  2.6<L>  /  TBili  0.4  /  DBili  x   /  AST  34  /  ALT  30  /  AlkPhos  93  11-23

## 2022-11-24 PROCEDURE — 99232 SBSQ HOSP IP/OBS MODERATE 35: CPT

## 2022-11-24 RX ADMIN — CEFTRIAXONE 100 MILLIGRAM(S): 500 INJECTION, POWDER, FOR SOLUTION INTRAMUSCULAR; INTRAVENOUS at 13:58

## 2022-11-24 RX ADMIN — DEXTROSE MONOHYDRATE, SODIUM CHLORIDE, AND POTASSIUM CHLORIDE 75 MILLILITER(S): 50; .745; 4.5 INJECTION, SOLUTION INTRAVENOUS at 06:04

## 2022-11-24 RX ADMIN — QUETIAPINE FUMARATE 25 MILLIGRAM(S): 200 TABLET, FILM COATED ORAL at 21:06

## 2022-11-24 RX ADMIN — MEMANTINE HYDROCHLORIDE 10 MILLIGRAM(S): 10 TABLET ORAL at 13:52

## 2022-11-24 RX ADMIN — DONEPEZIL HYDROCHLORIDE 15 MILLIGRAM(S): 10 TABLET, FILM COATED ORAL at 21:05

## 2022-11-24 RX ADMIN — Medication 81 MILLIGRAM(S): at 13:52

## 2022-11-24 RX ADMIN — ATORVASTATIN CALCIUM 40 MILLIGRAM(S): 80 TABLET, FILM COATED ORAL at 21:06

## 2022-11-24 RX ADMIN — HEPARIN SODIUM 5000 UNIT(S): 5000 INJECTION INTRAVENOUS; SUBCUTANEOUS at 06:04

## 2022-11-24 RX ADMIN — AMLODIPINE BESYLATE 2.5 MILLIGRAM(S): 2.5 TABLET ORAL at 06:04

## 2022-11-24 RX ADMIN — HEPARIN SODIUM 5000 UNIT(S): 5000 INJECTION INTRAVENOUS; SUBCUTANEOUS at 17:54

## 2022-11-25 LAB
ANION GAP SERPL CALC-SCNC: 5 MMOL/L — SIGNIFICANT CHANGE UP (ref 5–17)
BUN SERPL-MCNC: 21 MG/DL — SIGNIFICANT CHANGE UP (ref 7–23)
CALCIUM SERPL-MCNC: 9.1 MG/DL — SIGNIFICANT CHANGE UP (ref 8.5–10.1)
CHLORIDE SERPL-SCNC: 109 MMOL/L — HIGH (ref 96–108)
CO2 SERPL-SCNC: 24 MMOL/L — SIGNIFICANT CHANGE UP (ref 22–31)
CREAT SERPL-MCNC: 1.07 MG/DL — SIGNIFICANT CHANGE UP (ref 0.5–1.3)
EGFR: 68 ML/MIN/1.73M2 — SIGNIFICANT CHANGE UP
FLUAV AG NPH QL: SIGNIFICANT CHANGE UP
FLUBV AG NPH QL: SIGNIFICANT CHANGE UP
GLUCOSE SERPL-MCNC: 185 MG/DL — HIGH (ref 70–99)
POTASSIUM SERPL-MCNC: 4.3 MMOL/L — SIGNIFICANT CHANGE UP (ref 3.5–5.3)
POTASSIUM SERPL-SCNC: 4.3 MMOL/L — SIGNIFICANT CHANGE UP (ref 3.5–5.3)
SARS-COV-2 RNA SPEC QL NAA+PROBE: SIGNIFICANT CHANGE UP
SODIUM SERPL-SCNC: 138 MMOL/L — SIGNIFICANT CHANGE UP (ref 135–145)

## 2022-11-25 PROCEDURE — 99232 SBSQ HOSP IP/OBS MODERATE 35: CPT

## 2022-11-25 PROCEDURE — 99231 SBSQ HOSP IP/OBS SF/LOW 25: CPT

## 2022-11-25 RX ADMIN — AMLODIPINE BESYLATE 2.5 MILLIGRAM(S): 2.5 TABLET ORAL at 05:16

## 2022-11-25 RX ADMIN — DONEPEZIL HYDROCHLORIDE 15 MILLIGRAM(S): 10 TABLET, FILM COATED ORAL at 21:38

## 2022-11-25 RX ADMIN — ATORVASTATIN CALCIUM 40 MILLIGRAM(S): 80 TABLET, FILM COATED ORAL at 21:38

## 2022-11-25 RX ADMIN — CEFTRIAXONE 100 MILLIGRAM(S): 500 INJECTION, POWDER, FOR SOLUTION INTRAMUSCULAR; INTRAVENOUS at 13:57

## 2022-11-25 RX ADMIN — QUETIAPINE FUMARATE 25 MILLIGRAM(S): 200 TABLET, FILM COATED ORAL at 21:38

## 2022-11-25 RX ADMIN — HEPARIN SODIUM 5000 UNIT(S): 5000 INJECTION INTRAVENOUS; SUBCUTANEOUS at 18:57

## 2022-11-25 RX ADMIN — DEXTROSE MONOHYDRATE, SODIUM CHLORIDE, AND POTASSIUM CHLORIDE 75 MILLILITER(S): 50; .745; 4.5 INJECTION, SOLUTION INTRAVENOUS at 20:48

## 2022-11-25 RX ADMIN — MEMANTINE HYDROCHLORIDE 10 MILLIGRAM(S): 10 TABLET ORAL at 13:54

## 2022-11-25 RX ADMIN — Medication 81 MILLIGRAM(S): at 13:58

## 2022-11-25 RX ADMIN — HEPARIN SODIUM 5000 UNIT(S): 5000 INJECTION INTRAVENOUS; SUBCUTANEOUS at 05:16

## 2022-11-25 NOTE — BH CONSULTATION LIAISON PROGRESS NOTE - NSBHFUPINTERVALHXFT_PSY_A_CORE
Patient thus far tolerating the increased Aricept dose of 15mg PO qhs; too early to ascertain clinical response. Patient did not need any PRNs since last seen; remains calm, cooperative, no combative behavior. Medication compliant. Same clinical presentation which is highly likely Pt's baseline. Obtained collateral from his pharmacy and his wife Maggie on 11/23/22.

## 2022-11-25 NOTE — BH CONSULTATION LIAISON PROGRESS NOTE - NSBHCONSULTRECOMMENDOTHER_PSY_A_CORE FT
11/23/22: Writer called pt's pharmacy at [facility] [phone]: Neurologist Arvin Espinoza MD prescribes the Aricept which was started as the orally dissolvable tab on 09/2020 and changed to the regular tab on 06/2021. Kept at 10mg. Family Medicine physician Rebecca Bustos MD prescribes the Namenda which was started at 5mg on 02/2021 and increased to 10mg on 09/22.  - increase Aricept from 10mg PO qhs to 15mg PO qhs (has not been increased in 1 year; discussed with Pt's spouse Maggie); continue Namenda 10mg PO qhs as it was just increased last month  - Patient does not have capacity to make his medical decisions; decision-maker is his wife Maggie   11/25/22: continue Aricept 15mg PO qhs, Namenda 10mg PO in am, Seroquel 25mg PO qhs + low dose PRNs  - consider GOC conversation with spouse given advancing neurodegenerative process for which there is no formal treatment

## 2022-11-25 NOTE — BH CONSULTATION LIAISON PROGRESS NOTE - OTHER
does not look distressed or uncomfortable  deferred at this time  not establishing meaningful eye contact and with minimal response even to his name being called.  adequate  impaired / very limited

## 2022-11-26 PROCEDURE — 99232 SBSQ HOSP IP/OBS MODERATE 35: CPT

## 2022-11-26 RX ADMIN — AMLODIPINE BESYLATE 2.5 MILLIGRAM(S): 2.5 TABLET ORAL at 05:29

## 2022-11-26 RX ADMIN — ATORVASTATIN CALCIUM 40 MILLIGRAM(S): 80 TABLET, FILM COATED ORAL at 21:58

## 2022-11-26 RX ADMIN — Medication 81 MILLIGRAM(S): at 11:15

## 2022-11-26 RX ADMIN — QUETIAPINE FUMARATE 25 MILLIGRAM(S): 200 TABLET, FILM COATED ORAL at 21:58

## 2022-11-26 RX ADMIN — DONEPEZIL HYDROCHLORIDE 15 MILLIGRAM(S): 10 TABLET, FILM COATED ORAL at 21:58

## 2022-11-26 RX ADMIN — HEPARIN SODIUM 5000 UNIT(S): 5000 INJECTION INTRAVENOUS; SUBCUTANEOUS at 17:34

## 2022-11-26 RX ADMIN — MEMANTINE HYDROCHLORIDE 10 MILLIGRAM(S): 10 TABLET ORAL at 11:09

## 2022-11-26 RX ADMIN — HEPARIN SODIUM 5000 UNIT(S): 5000 INJECTION INTRAVENOUS; SUBCUTANEOUS at 05:29

## 2022-11-26 RX ADMIN — DEXTROSE MONOHYDRATE, SODIUM CHLORIDE, AND POTASSIUM CHLORIDE 75 MILLILITER(S): 50; .745; 4.5 INJECTION, SOLUTION INTRAVENOUS at 11:07

## 2022-11-27 PROCEDURE — 99232 SBSQ HOSP IP/OBS MODERATE 35: CPT

## 2022-11-27 RX ADMIN — QUETIAPINE FUMARATE 25 MILLIGRAM(S): 200 TABLET, FILM COATED ORAL at 22:09

## 2022-11-27 RX ADMIN — DONEPEZIL HYDROCHLORIDE 15 MILLIGRAM(S): 10 TABLET, FILM COATED ORAL at 22:10

## 2022-11-27 RX ADMIN — Medication 81 MILLIGRAM(S): at 16:16

## 2022-11-27 RX ADMIN — DEXTROSE MONOHYDRATE, SODIUM CHLORIDE, AND POTASSIUM CHLORIDE 75 MILLILITER(S): 50; .745; 4.5 INJECTION, SOLUTION INTRAVENOUS at 03:42

## 2022-11-27 RX ADMIN — HEPARIN SODIUM 5000 UNIT(S): 5000 INJECTION INTRAVENOUS; SUBCUTANEOUS at 07:00

## 2022-11-27 RX ADMIN — HEPARIN SODIUM 5000 UNIT(S): 5000 INJECTION INTRAVENOUS; SUBCUTANEOUS at 19:26

## 2022-11-27 RX ADMIN — ATORVASTATIN CALCIUM 40 MILLIGRAM(S): 80 TABLET, FILM COATED ORAL at 22:10

## 2022-11-27 RX ADMIN — AMLODIPINE BESYLATE 2.5 MILLIGRAM(S): 2.5 TABLET ORAL at 07:00

## 2022-11-27 RX ADMIN — Medication 100 MILLIGRAM(S): at 06:07

## 2022-11-27 RX ADMIN — MEMANTINE HYDROCHLORIDE 10 MILLIGRAM(S): 10 TABLET ORAL at 16:14

## 2022-11-27 NOTE — PROGRESS NOTE ADULT - SUBJECTIVE AND OBJECTIVE BOX
Patient is a 87y old  Male who presents with a chief complaint of AMS, lethargy (2022 12:02)    INTERVAL HPI/OVERNIGHT EVENTS: no over night events  SUBJECTIVE: unable to obtain    MEDICATIONS  (STANDING):  amLODIPine   Tablet 2.5 milliGRAM(s) Oral daily  aspirin enteric coated 81 milliGRAM(s) Oral daily  atorvastatin 40 milliGRAM(s) Oral at bedtime  cefTRIAXone   IVPB 1000 milliGRAM(s) IV Intermittent every 24 hours  dextrose 5% + sodium chloride 0.45% with potassium chloride 20 mEq/L 1000 milliLiter(s) (75 mL/Hr) IV Continuous <Continuous>  donepezil 10 milliGRAM(s) Oral at bedtime  heparin   Injectable 5000 Unit(s) SubCutaneous every 12 hours  memantine 10 milliGRAM(s) Oral at bedtime  QUEtiapine 25 milliGRAM(s) Oral at bedtime    MEDICATIONS  (PRN):  haloperidol    Injectable 2 milliGRAM(s) IntraMuscular every 8 hours PRN agitation    Allergies    penicillin (Hives)    Intolerances      REVIEW OF SYSTEMS:  All other systems reviewed and are negative    Vital Signs Last 24 Hrs  T(C): 36.2 (2022 05:53), Max: 36.3 (2022 17:20)  T(F): 97.2 (2022 05:53), Max: 97.3 (2022 17:20)  HR: 62 (2022 05:53) (62 - 101)  BP: 109/67 (2022 05:53) (109/67 - 134/84)  BP(mean): --  RR: 18 (2022 05:53) (18 - 18)  SpO2: 94% (2022 05:53) (94% - 98%)    Parameters below as of 2022 17:20  Patient On (Oxygen Delivery Method): room air      Daily     Daily   I&O's Summary    2022 07:01  -  2022 07:00  --------------------------------------------------------  IN: 612.5 mL / OUT: 500 mL / NET: 112.5 mL      CAPILLARY BLOOD GLUCOSE        PHYSICAL EXAM:  GENERAL: NAD, well-groomed, well-developed. cachetic. sleeping  HEAD:  Atraumatic, Normocephalic  EYES: EOMI, PERRLA, conjunctiva and sclera clear  ENMT: No tonsillar erythema, exudates, or enlargement; Moist mucous membranes, Good dentition, No lesions  NECK: Supple, No JVD, Normal thyroid  CHEST/LUNG: Clear to percussion bilaterally; No rales, rhonchi, wheezing, or rubs  HEART: Regular rate and rhythm; No murmurs, rubs, or gallops  ABDOMEN: Soft, Nontender, Nondistended; Bowel sounds present  EXTREMITIES:  2+ Peripheral Pulses, No clubbing, cyanosis, or edema  LYMPH: No lymphadenopathy noted  SKIN: No rashes or lesions  Lines: aleman    Labs                          12.4   9.56  )-----------( 227      ( 2022 06:05 )             39.5     11-22    142  |  110<H>  |  18  ----------------------------<  119<H>  3.8   |  24  |  0.85    Ca    8.7      2022 06:05  Phos  2.7     11-  Mg     2.1     -    TPro  5.8<L>  /  Alb  2.8<L>  /  TBili  0.8  /  DBili  x   /  AST  17  /  ALT  12  /  AlkPhos  94  11-22          Urinalysis Basic - ( 2022 20:41 )    Color: Yellow / Appearance: Clear / S.020 / pH: x  Gluc: x / Ketone: Trace  / Bili: Negative / Urobili: Negative mg/dL   Blood: x / Protein: 15 mg/dL / Nitrite: Negative   Leuk Esterase: Trace / RBC: 0-2 /HPF / WBC 3-5   Sq Epi: x / Non Sq Epi: Occasional / Bacteria: x        Culture - Urine (collected 2022 20:41)  Source: Clean Catch Clean Catch (Midstream)  Final Report (2022 09:33):    No growth        
Patient is a 87y old  Male who presents with a chief complaint of AMS, lethargy (23 Nov 2022 14:02)    INTERVAL HPI/OVERNIGHT EVENTS: had profuse coughing after breakfast this morning  SUBJECTIVE: no complaints    MEDICATIONS  (STANDING):  amLODIPine   Tablet 2.5 milliGRAM(s) Oral daily  aspirin  chewable 81 milliGRAM(s) Oral daily  atorvastatin 40 milliGRAM(s) Oral at bedtime  cefTRIAXone   IVPB 1000 milliGRAM(s) IV Intermittent every 24 hours  dextrose 5% + sodium chloride 0.45% with potassium chloride 20 mEq/L 1000 milliLiter(s) (75 mL/Hr) IV Continuous <Continuous>  donepezil 15 milliGRAM(s) Oral at bedtime  heparin   Injectable 5000 Unit(s) SubCutaneous every 12 hours  memantine 10 milliGRAM(s) Oral daily  QUEtiapine 25 milliGRAM(s) Oral at bedtime    MEDICATIONS  (PRN):  haloperidol    Injectable 2 milliGRAM(s) IntraMuscular every 8 hours PRN agitation  QUEtiapine 12.5 milliGRAM(s) Oral three times a day PRN mild agitation / give 30 minutes prior to ADL assistance    Allergies    penicillin (Hives)    Intolerances      REVIEW OF SYSTEMS:  All other systems reviewed and are negative    Vital Signs Last 24 Hrs  T(C): 36.7 (24 Nov 2022 12:59), Max: 37.3 (24 Nov 2022 05:23)  T(F): 98 (24 Nov 2022 12:59), Max: 99.2 (24 Nov 2022 05:23)  HR: 89 (24 Nov 2022 12:59) (81 - 92)  BP: 112/74 (24 Nov 2022 12:59) (105/65 - 146/82)  BP(mean): --  RR: 20 (24 Nov 2022 12:59) (18 - 20)  SpO2: 97% (24 Nov 2022 12:59) (95% - 97%)    Parameters below as of 24 Nov 2022 12:59  Patient On (Oxygen Delivery Method): room air      Daily     Daily   I&O's Summary    23 Nov 2022 07:01  -  24 Nov 2022 07:00  --------------------------------------------------------  IN: 237 mL / OUT: 0 mL / NET: 237 mL      CAPILLARY BLOOD GLUCOSE        PHYSICAL EXAM:  GENERAL: NAD, well-groomed, well-developed. cachetic. frail  HEAD:  Atraumatic, Normocephalic  EYES: EOMI, PERRLA, conjunctiva and sclera clear  ENMT: No tonsillar erythema, exudates, or enlargement; Moist mucous membranes, Good dentition, No lesions  NECK: Supple, No JVD, Normal thyroid  NERVOUS SYSTEM:  Alert & Oriented X3, Good concentration; Motor Strength 5/5 B/L upper and lower extremities; DTRs 2+ intact and symmetric  CHEST/LUNG: Clear to percussion bilaterally; No rales, rhonchi, wheezing, or rubs  HEART: Regular rate and rhythm; No murmurs, rubs, or gallops  ABDOMEN: Soft, Nontender, Nondistended; Bowel sounds present  EXTREMITIES:  2+ Peripheral Pulses, No clubbing, cyanosis, or edema  LYMPH: No lymphadenopathy noted  SKIN: No rashes or lesions    Labs                          12.4   7.71  )-----------( 244      ( 23 Nov 2022 05:45 )             39.7     11-23    140  |  110<H>  |  20  ----------------------------<  93  3.8   |  24  |  0.84    Ca    8.8      23 Nov 2022 05:45  Phos  3.4     11-23  Mg     2.1     11-23    TPro  6.0  /  Alb  2.6<L>  /  TBili  0.4  /  DBili  x   /  AST  34  /  ALT  30  /  AlkPhos  93  11-23                    
Patient is a 87y old  Male who presents with a chief complaint of AMS, lethargy (26 Nov 2022 09:54)    INTERVAL HPI/OVERNIGHT EVENTS: no events     MEDICATIONS  (STANDING):  amLODIPine   Tablet 2.5 milliGRAM(s) Oral daily  aspirin  chewable 81 milliGRAM(s) Oral daily  atorvastatin 40 milliGRAM(s) Oral at bedtime  dextrose 5% + sodium chloride 0.45% with potassium chloride 20 mEq/L 1000 milliLiter(s) (75 mL/Hr) IV Continuous <Continuous>  donepezil 15 milliGRAM(s) Oral at bedtime  heparin   Injectable 5000 Unit(s) SubCutaneous every 12 hours  memantine 10 milliGRAM(s) Oral daily  QUEtiapine 25 milliGRAM(s) Oral at bedtime    MEDICATIONS  (PRN):  guaiFENesin Oral Liquid (Sugar-Free) 100 milliGRAM(s) Oral every 6 hours PRN Cough  haloperidol    Injectable 2 milliGRAM(s) IntraMuscular every 8 hours PRN agitation  QUEtiapine 12.5 milliGRAM(s) Oral three times a day PRN mild agitation / give 30 minutes prior to ADL assistance    Allergies    penicillin (Hives)    Intolerances      REVIEW OF SYSTEMS:  All other systems reviewed and are negative    Vital Signs Last 24 Hrs  T(C): 36.2 (27 Nov 2022 05:17), Max: 37.1 (26 Nov 2022 13:00)  T(F): 97.2 (27 Nov 2022 05:17), Max: 98.7 (26 Nov 2022 13:00)  HR: 69 (27 Nov 2022 05:17) (69 - 88)  BP: 141/83 (27 Nov 2022 05:17) (110/69 - 145/82)  BP(mean): --  RR: 18 (27 Nov 2022 05:17) (18 - 18)  SpO2: 94% (27 Nov 2022 05:17) (91% - 95%)    Parameters below as of 27 Nov 2022 00:00  Patient On (Oxygen Delivery Method): room air      Daily     Daily   I&O's Summary    26 Nov 2022 07:01  -  27 Nov 2022 07:00  --------------------------------------------------------  IN: 900 mL / OUT: 0 mL / NET: 900 mL      CAPILLARY BLOOD GLUCOSE        PHYSICAL EXAM:  GENERAL: NAD, well-groomed, well-developed  HEAD:  Atraumatic, Normocephalic  EYES: EOMI, PERRLA, conjunctiva and sclera clear  ENMT: No tonsillar erythema, exudates, or enlargement; Moist mucous membranes, Good dentition, No lesions  NECK: Supple, No JVD, Normal thyroid  CHEST/LUNG: Clear to percussion bilaterally; No rales, rhonchi, wheezing, or rubs  HEART: Regular rate and rhythm; No murmurs, rubs, or gallops  ABDOMEN: Soft, Nontender, Nondistended; Bowel sounds present  EXTREMITIES:  2+ Peripheral Pulses, No clubbing, cyanosis, or edema  LYMPH: No lymphadenopathy noted  SKIN: No rashes or lesions    Labs      11-25    138  |  109<H>  |  21  ----------------------------<  185<H>  4.3   |  24  |  1.07    Ca    9.1      25 Nov 2022 10:30                          DVT prophylaxis: > Lovenox 40mg SQ daily  > Heparin   > SCD's
Patient is a 87y old  Male who presents with a chief complaint of AMS, lethargy (2022 00:11)    INTERVAL HPI/OVERNIGHT EVENTS: no acute events  SUBJECTIVE: unable to obtain    MEDICATIONS  (STANDING):  amLODIPine   Tablet 2.5 milliGRAM(s) Oral daily  aspirin enteric coated 81 milliGRAM(s) Oral daily  atorvastatin 40 milliGRAM(s) Oral at bedtime  cefTRIAXone   IVPB 1000 milliGRAM(s) IV Intermittent every 24 hours  dextrose 5% + sodium chloride 0.45% with potassium chloride 20 mEq/L 1000 milliLiter(s) (75 mL/Hr) IV Continuous <Continuous>  donepezil 10 milliGRAM(s) Oral at bedtime  heparin   Injectable 5000 Unit(s) SubCutaneous every 12 hours  memantine 10 milliGRAM(s) Oral at bedtime  QUEtiapine 25 milliGRAM(s) Oral at bedtime    MEDICATIONS  (PRN):    Allergies    penicillin (Hives)    Intolerances      REVIEW OF SYSTEMS:  All other systems reviewed and are negative    Vital Signs Last 24 Hrs  T(C): 36.4 (2022 05:42), Max: 36.9 (2022 01:39)  T(F): 97.6 (2022 05:42), Max: 98.4 (2022 01:39)  HR: 62 (2022 05:42) (53 - 85)  BP: 161/87 (2022 05:42) (102/66 - 161/87)  BP(mean): --  RR: 18 (2022 05:42) (14 - 19)  SpO2: 94% (2022 05:42) (94% - 96%)    Parameters below as of 2022 01:39  Patient On (Oxygen Delivery Method): room air      Daily Height in cm: 175.3 (2022 01:39)    Daily   I&O's Summary    2022 07:01  -  2022 07:00  --------------------------------------------------------  IN: 10 mL / OUT: 850 mL / NET: -840 mL      CAPILLARY BLOOD GLUCOSE        PHYSICAL EXAM:  GENERAL: NAD, well-groomed, well-developed. cachetic. sedated but comfortable  HEAD:  Atraumatic, Normocephalic  EYES: EOMI, PERRLA, conjunctiva and sclera clear  ENMT: No tonsillar erythema, exudates, or enlargement; Moist mucous membranes, Good dentition, No lesions  CHEST/LUNG: Clear to percussion bilaterally; No rales, rhonchi, wheezing, or rubs  HEART: Regular rate and rhythm; No murmurs, rubs, or gallops  ABDOMEN: Soft, Nontender, Nondistended; Bowel sounds present  LYMPH: No lymphadenopathy noted    Labs                          13.2   12.16 )-----------( 236      ( 2022 14:20 )             43.4     -    141  |  108  |  20  ----------------------------<  102<H>  4.7   |  27  |  1.08    Ca    9.5      2022 14:20  Phos  2.8     1120  Mg     2.3         TPro  7.0  /  Alb  3.3  /  TBili  0.8  /  DBili  x   /  AST  14<L>  /  ALT  11<L>  /  AlkPhos  111  11-20          Urinalysis Basic - ( 2022 20:41 )    Color: Yellow / Appearance: Clear / S.020 / pH: x  Gluc: x / Ketone: Trace  / Bili: Negative / Urobili: Negative mg/dL   Blood: x / Protein: 15 mg/dL / Nitrite: Negative   Leuk Esterase: Trace / RBC: 0-2 /HPF / WBC 3-5   Sq Epi: x / Non Sq Epi: Occasional / Bacteria: x
Patient is a 87y old  Male who presents with a chief complaint of AMS, lethargy (24 Nov 2022 15:13)    INTERVAL HPI/OVERNIGHT EVENTS: coughing and made dark phelgm  SUBJECTIVE: no fever/chills    MEDICATIONS  (STANDING):  amLODIPine   Tablet 2.5 milliGRAM(s) Oral daily  aspirin  chewable 81 milliGRAM(s) Oral daily  atorvastatin 40 milliGRAM(s) Oral at bedtime  cefTRIAXone   IVPB 1000 milliGRAM(s) IV Intermittent every 24 hours  dextrose 5% + sodium chloride 0.45% with potassium chloride 20 mEq/L 1000 milliLiter(s) (75 mL/Hr) IV Continuous <Continuous>  donepezil 15 milliGRAM(s) Oral at bedtime  heparin   Injectable 5000 Unit(s) SubCutaneous every 12 hours  memantine 10 milliGRAM(s) Oral daily  QUEtiapine 25 milliGRAM(s) Oral at bedtime    MEDICATIONS  (PRN):  haloperidol    Injectable 2 milliGRAM(s) IntraMuscular every 8 hours PRN agitation  QUEtiapine 12.5 milliGRAM(s) Oral three times a day PRN mild agitation / give 30 minutes prior to ADL assistance    Allergies    penicillin (Hives)    Intolerances      REVIEW OF SYSTEMS:  All other systems reviewed and are negative    Vital Signs Last 24 Hrs  T(C): 36.4 (25 Nov 2022 11:47), Max: 36.7 (24 Nov 2022 12:59)  T(F): 97.5 (25 Nov 2022 11:47), Max: 98 (24 Nov 2022 12:59)  HR: 79 (25 Nov 2022 11:47) (79 - 97)  BP: 95/62 (25 Nov 2022 11:47) (95/53 - 155/82)  BP(mean): --  RR: 17 (25 Nov 2022 11:47) (17 - 20)  SpO2: 94% (25 Nov 2022 11:47) (93% - 97%)    Parameters below as of 25 Nov 2022 05:20  Patient On (Oxygen Delivery Method): room air      Daily     Daily   I&O's Summary    24 Nov 2022 07:01  -  25 Nov 2022 07:00  --------------------------------------------------------  IN: 125 mL / OUT: 150 mL / NET: -25 mL      CAPILLARY BLOOD GLUCOSE        PHYSICAL EXAM:  GENERAL: NAD, well-groomed, well-developed  HEAD:  Atraumatic, Normocephalic  EYES: EOMI, PERRLA, conjunctiva and sclera clear  ENMT: No tonsillar erythema, exudates, or enlargement; Moist mucous membranes, Good dentition, No lesions  NECK: Supple, No JVD, Normal thyroid  CHEST/LUNG: Clear to percussion bilaterally; No rales, rhonchi, wheezing, or rubs  HEART: Regular rate and rhythm; No murmurs, rubs, or gallops  ABDOMEN: Soft, Nontender, Nondistended; Bowel sounds present  EXTREMITIES:  2+ Peripheral Pulses, No clubbing, cyanosis, or edema  LYMPH: No lymphadenopathy noted  SKIN: No rashes or lesions    Labs      11-25    138  |  109<H>  |  21  ----------------------------<  185<H>  4.3   |  24  |  1.07    Ca    9.1      25 Nov 2022 10:30                    
Patient is a 87y old  Male who presents with a chief complaint of AMS     (23 Nov 2022 13:14)    INTERVAL HPI/OVERNIGHT EVENTS: no overnight events  SUBJECTIVE: unable to obtain    MEDICATIONS  (STANDING):  amLODIPine   Tablet 2.5 milliGRAM(s) Oral daily  aspirin  chewable 81 milliGRAM(s) Oral daily  atorvastatin 40 milliGRAM(s) Oral at bedtime  cefTRIAXone   IVPB 1000 milliGRAM(s) IV Intermittent every 24 hours  dextrose 5% + sodium chloride 0.45% with potassium chloride 20 mEq/L 1000 milliLiter(s) (75 mL/Hr) IV Continuous <Continuous>  donepezil 15 milliGRAM(s) Oral at bedtime  heparin   Injectable 5000 Unit(s) SubCutaneous every 12 hours  memantine 10 milliGRAM(s) Oral daily  QUEtiapine 25 milliGRAM(s) Oral at bedtime    MEDICATIONS  (PRN):  haloperidol    Injectable 2 milliGRAM(s) IntraMuscular every 8 hours PRN agitation  QUEtiapine 12.5 milliGRAM(s) Oral three times a day PRN mild agitation / give 30 minutes prior to ADL assistance    Allergies    penicillin (Hives)    Intolerances      REVIEW OF SYSTEMS:  All other systems reviewed and are negative    Vital Signs Last 24 Hrs  T(C): 37.1 (23 Nov 2022 11:00), Max: 37.1 (23 Nov 2022 11:00)  T(F): 98.7 (23 Nov 2022 11:00), Max: 98.7 (23 Nov 2022 11:00)  HR: 64 (23 Nov 2022 11:00) (60 - 77)  BP: 122/74 (23 Nov 2022 11:00) (122/74 - 151/77)  BP(mean): --  RR: 18 (23 Nov 2022 11:00) (18 - 18)  SpO2: 94% (23 Nov 2022 11:00) (94% - 95%)    Parameters below as of 23 Nov 2022 11:00  Patient On (Oxygen Delivery Method): room air      Daily     Daily   I&O's Summary    22 Nov 2022 07:01  -  23 Nov 2022 07:00  --------------------------------------------------------  IN: 0 mL / OUT: 700 mL / NET: -700 mL      CAPILLARY BLOOD GLUCOSE        PHYSICAL EXAM:  GENERAL: NAD, well-groomed, well-developed. cachetic  HEAD:  Atraumatic, Normocephalic  EYES: EOMI, PERRLA, conjunctiva and sclera clear  ENMT: No tonsillar erythema, exudates, or enlargement; Moist mucous membranes, Good dentition, No lesions  NECK: Supple, No JVD, Normal thyroid  NERVOUS SYSTEM:  Alert & Oriented X3, Good concentration; Motor Strength 5/5 B/L upper and lower extremities; DTRs 2+ intact and symmetric  CHEST/LUNG: Clear to percussion bilaterally; No rales, rhonchi, wheezing, or rubs  HEART: Regular rate and rhythm; No murmurs, rubs, or gallops  ABDOMEN: Soft, Nontender, Nondistended; Bowel sounds present  EXTREMITIES:  2+ Peripheral Pulses, No clubbing, cyanosis, or edema  LYMPH: No lymphadenopathy noted  SKIN: No rashes or lesions    Labs                          12.4   7.71  )-----------( 244      ( 23 Nov 2022 05:45 )             39.7     11-23    140  |  110<H>  |  20  ----------------------------<  93  3.8   |  24  |  0.84    Ca    8.8      23 Nov 2022 05:45  Phos  3.4     11-23  Mg     2.1     11-23    TPro  6.0  /  Alb  2.6<L>  /  TBili  0.4  /  DBili  x   /  AST  34  /  ALT  30  /  AlkPhos  93  11-23              Culture - Urine (collected 20 Nov 2022 20:41)  Source: Clean Catch Clean Catch (Midstream)  Final Report (22 Nov 2022 09:33):    No growth          
Patient is a 87y old  Male who presents with a chief complaint of AMS, lethargy (25 Nov 2022 12:53)    INTERVAL HPI/OVERNIGHT EVENTS:    MEDICATIONS  (STANDING):  amLODIPine   Tablet 2.5 milliGRAM(s) Oral daily  aspirin  chewable 81 milliGRAM(s) Oral daily  atorvastatin 40 milliGRAM(s) Oral at bedtime  dextrose 5% + sodium chloride 0.45% with potassium chloride 20 mEq/L 1000 milliLiter(s) (75 mL/Hr) IV Continuous <Continuous>  donepezil 15 milliGRAM(s) Oral at bedtime  heparin   Injectable 5000 Unit(s) SubCutaneous every 12 hours  memantine 10 milliGRAM(s) Oral daily  QUEtiapine 25 milliGRAM(s) Oral at bedtime    MEDICATIONS  (PRN):  haloperidol    Injectable 2 milliGRAM(s) IntraMuscular every 8 hours PRN agitation  QUEtiapine 12.5 milliGRAM(s) Oral three times a day PRN mild agitation / give 30 minutes prior to ADL assistance    Allergies    penicillin (Hives)    Intolerances      REVIEW OF SYSTEMS:  All other systems reviewed and are negative    Vital Signs Last 24 Hrs  T(C): 36.6 (26 Nov 2022 05:18), Max: 36.8 (25 Nov 2022 17:46)  T(F): 97.8 (26 Nov 2022 05:18), Max: 98.2 (25 Nov 2022 17:46)  HR: 83 (26 Nov 2022 05:18) (62 - 98)  BP: 145/78 (26 Nov 2022 05:18) (95/62 - 149/80)  BP(mean): --  RR: 18 (26 Nov 2022 05:18) (17 - 18)  SpO2: 91% (26 Nov 2022 05:18) (91% - 95%)    Parameters below as of 25 Nov 2022 17:46  Patient On (Oxygen Delivery Method): room air      Daily     Daily   I&O's Summary    25 Nov 2022 07:01  -  26 Nov 2022 07:00  --------------------------------------------------------  IN: 900 mL / OUT: 0 mL / NET: 900 mL      CAPILLARY BLOOD GLUCOSE        PHYSICAL EXAM:  GENERAL: NAD, well-groomed, well-developed  HEAD:  Atraumatic, Normocephalic  EYES: EOMI, PERRLA, conjunctiva and sclera clear  ENMT: No tonsillar erythema, exudates, or enlargement; Moist mucous membranes, Good dentition, No lesions  NECK: Supple, No JVD, Normal thyroid  CHEST/LUNG: Clear to percussion bilaterally; No rales, rhonchi, wheezing, or rubs  HEART: Regular rate and rhythm; No murmurs, rubs, or gallops  ABDOMEN: Soft, Nontender, Nondistended; Bowel sounds present  EXTREMITIES:  2+ Peripheral Pulses, No clubbing, cyanosis, or edema  LYMPH: No lymphadenopathy noted  SKIN: No rashes or lesions    Labs      11-25    138  |  109<H>  |  21  ----------------------------<  185<H>  4.3   |  24  |  1.07    Ca    9.1      25 Nov 2022 10:30                          DVT prophylaxis: > Lovenox 40mg SQ daily  > Heparin   > SCD's

## 2022-11-27 NOTE — PROGRESS NOTE ADULT - PROVIDER SPECIALTY LIST ADULT
Internal Medicine
Internal Medicine
Hospitalist
Internal Medicine
Hospitalist
Internal Medicine
Internal Medicine

## 2022-11-27 NOTE — PROGRESS NOTE ADULT - NUTRITIONAL ASSESSMENT
This patient has been assessed with a concern for Malnutrition and has been determined to have a diagnosis/diagnoses of Severe protein-calorie malnutrition and Underweight (BMI < 19).    This patient is being managed with:   Diet DASH/TLC-  Sodium & Cholesterol Restricted  Easy to Chew (EASYTOCHEW)  Entered: Nov 21 2022 12:18AM    The following pending diet order is being considered for treatment of Severe protein-calorie malnutrition and Underweight (BMI < 19):  Diet Easy to Chew-  Low Sodium  Supplement Feeding Modality:  Oral  Ensure Enlive Cans or Servings Per Day:  1       Frequency:  Two Times a day  Entered: Nov 23 2022  1:42PM  
This patient has been assessed with a concern for Malnutrition and has been determined to have a diagnosis/diagnoses of Severe protein-calorie malnutrition and Underweight (BMI < 19).    This patient is being managed with:   Diet Easy to Chew-  Low Sodium  Supplement Feeding Modality:  Oral  Ensure Enlive Cans or Servings Per Day:  1       Frequency:  Two Times a day  Entered: Nov 23 2022  1:42PM    
This patient has been assessed with a concern for Malnutrition and has been determined to have a diagnosis/diagnoses of Severe protein-calorie malnutrition and Underweight (BMI < 19).    This patient is being managed with:   Diet DASH/TLC-  Sodium & Cholesterol Restricted  Easy to Chew (EASYTOCHEW)  Entered: Nov 21 2022 12:18AM    The following pending diet order is being considered for treatment of Severe protein-calorie malnutrition and Underweight (BMI < 19):  Diet Easy to Chew-  Low Sodium  Supplement Feeding Modality:  Oral  Ensure Enlive Cans or Servings Per Day:  1       Frequency:  Two Times a day  Entered: Nov 23 2022  1:42PM  
This patient has been assessed with a concern for Malnutrition and has been determined to have a diagnosis/diagnoses of Severe protein-calorie malnutrition and Underweight (BMI < 19).    This patient is being managed with:   Diet Easy to Chew-  Low Sodium  Supplement Feeding Modality:  Oral  Ensure Enlive Cans or Servings Per Day:  1       Frequency:  Two Times a day  Entered: Nov 23 2022  1:42PM    
This patient has been assessed with a concern for Malnutrition and has been determined to have a diagnosis/diagnoses of Severe protein-calorie malnutrition and Underweight (BMI < 19).    This patient is being managed with:   Diet Easy to Chew-  Low Sodium  Supplement Feeding Modality:  Oral  Ensure Enlive Cans or Servings Per Day:  1       Frequency:  Two Times a day  Entered: Nov 23 2022  1:42PM

## 2022-11-27 NOTE — PROGRESS NOTE ADULT - ASSESSMENT
86 yo male w/ pmhx of dementia w/ behavioral disturbances, HTN, prostate cancer on lupron admiitted to [facility] for acute agitated delirium    PSYCHIATRY/NEUROLOGY  # dementia w/ behavioral disturbances  # acute agitated delirium  - unclear etiology, possibly secondary to progressive dementia vs URI/INFECTIOUS DISEASE  - per pt's wife at bedside, pt had similar event in the past due to aspiration PNA  -   mild leukocytosis resolved   - on seroquel, memantine, aricept    CARDIOLOGY  # HTN  - norvasc    HEME/ONC  # prostate cancer  - gets lupron injection every 3 months         - care plan discussed with pt's wife at bedside on 11/25. she would like him to go to rehab.     Auth pending     
88 yo male w/ pmhx of dementia w/ behavioral disturbances, HTN, prostate cancer on lupron admiitted to [facility] for acute agitated delirium    PSYCHIATRY/NEUROLOGY  # dementia w/ behavioral disturbances  # acute agitated delirium  - unclear etiology, possibly secondary to progressive dementia vs URI/INFECTIOUS DISEASE  - per pt's wife at bedside, pt had similar event in the past due to aspiration PNA  - there has been no fever but there is a mild leukocytosis  - on seroquel, memantine, aricept    CARDIOLOGY  # HTN  - norvasc    HEME/ONC  # prostate cancer  - gets lupron injection every 3 months    PLAN  - c/w [facility]  - turn every 2 hrs  - start IVF  - c/w Rocephin  - PT reevaluation, possible DC in the am    - care plan discussed with pt's wife at bedside on 11/23. pt's wife reports that she wants to take him home. will have pt reevaluation. possible DC in the am, if pt at his baseline level of activity      
88 yo male w/ pmhx of dementia w/ behavioral disturbances, HTN, prostate cancer on lupron admiitted to [facility] for acute agitated delirium    PSYCHIATRY/NEUROLOGY  # dementia w/ behavioral disturbances  # acute agitated delirium  - unclear etiology, possibly secondary to progressive dementia vs URI/INFECTIOUS DISEASE  - per pt's wife at bedside, pt had similar event in the past due to aspiration PNA  - there has been no fever but there is a mild leukocytosis  - on seroquel, memantine, aricept    CARDIOLOGY  # HTN  - norvasc    HEME/ONC  # prostate cancer  - gets lupron injection every 3 months    PLAN  - c/w [facility]  - turn every 2 hrs  - start IVF  - c/w Rocephin  - PT reevaluation, possible DC in the am    - care plan discussed with pt's wife at bedside on 11/23. pt's wife reports that she wants to take him home. will have pt reevaluation. possible DC in the am, if pt at his baseline level of activity  
88 yo male w/ pmhx of dementia w/ behavioral disturbances, HTN, prostate cancer on lupron admiitted to [facility] for acute agitated delirium    PSYCHIATRY/NEUROLOGY  # dementia w/ behavioral disturbances  # acute agitated delirium  - unclear etiology, possibly secondary to progressive dementia vs URI/INFECTIOUS DISEASE  - per pt's wife at bedside, pt had similar event in the past due to aspiration PNA  - there has been no fever but there is a mild leukocytosis  - on seroquel, memantine, aricept    CARDIOLOGY  # HTN  - norvasc    HEME/ONC  # prostate cancer  - gets lupron injection every 3 months    PLAN  - c/w [facility]  - turn every 2 hrs  - start IVF  - check electrolytes in the am  - depending on clinical progress, if no improvement will consult PALLIATIVE CARE to clarify goals of care  - Rocephin for 5 days     - care plan discussed with pt's wife at bedside. she is very overwhelmed and considering long term placement. she does not have money for long term placement        
86 yo male w/ pmhx of dementia w/ behavioral disturbances, HTN, prostate cancer on lupron admiitted to [facility] for acute agitated delirium    PSYCHIATRY/NEUROLOGY  # dementia w/ behavioral disturbances  # acute agitated delirium  - unclear etiology, possibly secondary to progressive dementia vs URI/INFECTIOUS DISEASE  - per pt's wife at bedside, pt had similar event in the past due to aspiration PNA  - there has been no fever but there is a mild leukocytosis  - on seroquel, memantine, aricept    CARDIOLOGY  # HTN  - norvasc    HEME/ONC  # prostate cancer  - gets lupron injection every 3 months    PLAN  - c/w [facility]  - turn every 2 hrs  - start IVF  - c/w Rocephin  - PT reevaluation, possible DC in the am    - care plan discussed with pt's wife at bedside on 11/25. she would like him to go to rehab.     
88 yo male w/ pmhx of dementia w/ behavioral disturbances, HTN, prostate cancer on lupron admiitted to [facility] for acute agitated delirium    PSYCHIATRY/NEUROLOGY  # dementia w/ behavioral disturbances  # acute agitated delirium  - unclear etiology, possibly secondary to progressive dementia vs URI/INFECTIOUS DISEASE  - per pt's wife at bedside, pt had similar event in the past due to aspiration PNA  -   mild leukocytosis resolved   - on seroquel, memantine, aricept    CARDIOLOGY  # HTN  - norvasc    HEME/ONC  # prostate cancer  - gets lupron injection every 3 months         - care plan discussed with pt's wife at bedside   she would like him to go to rehab.     Auth pending     
88 yo male w/ pmhx of dementia w/ behavioral disturbances, HTN, prostate cancer on lupron admiitted to [facility] for acute agitated delirium    PSYCHIATRY/NEUROLOGY  # dementia w/ behavioral disturbances  # acute agitated delirium  - unclear etiology, possibly secondary to progressive dementia vs URI/INFECTIOUS DISEASE  - per pt's wife at bedside, pt had similar event in the past due to aspiration PNA  - there has been no fever but there is a mild leukocytosis  - on seroquel, memantine, aricept    CARDIOLOGY  # HTN  - norvasc    HEME/ONC  # prostate cancer  - gets lupron injection every 3 months    PLAN  - c/w [facility]  - turn every 2 hrs  - start IVF  - check electrolytes in the am  - depending on clinical progress, if no improvement will consult PALLIATIVE CARE to clarify goals of care  - Rocephin for 5 days     - care plan discussed with pt's wife at bedside. she is very overwhelmed and considering long term placement. she does not have money for long term placement

## 2022-11-28 ENCOUNTER — TRANSCRIPTION ENCOUNTER (OUTPATIENT)
Age: 87
End: 2022-11-28

## 2022-11-28 VITALS
OXYGEN SATURATION: 95 % | DIASTOLIC BLOOD PRESSURE: 82 MMHG | HEART RATE: 85 BPM | RESPIRATION RATE: 18 BRPM | SYSTOLIC BLOOD PRESSURE: 133 MMHG | TEMPERATURE: 98 F

## 2022-11-28 PROCEDURE — 99231 SBSQ HOSP IP/OBS SF/LOW 25: CPT

## 2022-11-28 PROCEDURE — 99239 HOSP IP/OBS DSCHRG MGMT >30: CPT

## 2022-11-28 RX ORDER — AMLODIPINE BESYLATE 2.5 MG/1
1 TABLET ORAL
Qty: 0 | Refills: 0 | DISCHARGE
Start: 2022-11-28

## 2022-11-28 RX ORDER — SODIUM CHLORIDE 9 MG/ML
250 INJECTION INTRAMUSCULAR; INTRAVENOUS; SUBCUTANEOUS ONCE
Refills: 0 | Status: COMPLETED | OUTPATIENT
Start: 2022-11-28 | End: 2022-11-28

## 2022-11-28 RX ORDER — ASPIRIN/CALCIUM CARB/MAGNESIUM 324 MG
1 TABLET ORAL
Qty: 0 | Refills: 0 | DISCHARGE
Start: 2022-11-28

## 2022-11-28 RX ORDER — AMLODIPINE BESYLATE 2.5 MG/1
2.5 TABLET ORAL DAILY
Refills: 0 | Status: DISCONTINUED | OUTPATIENT
Start: 2022-11-28 | End: 2022-11-28

## 2022-11-28 RX ORDER — ATORVASTATIN CALCIUM 80 MG/1
1 TABLET, FILM COATED ORAL
Qty: 0 | Refills: 0 | DISCHARGE
Start: 2022-11-28

## 2022-11-28 RX ORDER — SODIUM CHLORIDE 9 MG/ML
1000 INJECTION INTRAMUSCULAR; INTRAVENOUS; SUBCUTANEOUS ONCE
Refills: 0 | Status: COMPLETED | OUTPATIENT
Start: 2022-11-28 | End: 2022-11-28

## 2022-11-28 RX ORDER — MEMANTINE HYDROCHLORIDE 10 MG/1
1 TABLET ORAL
Qty: 30 | Refills: 0
Start: 2022-11-28 | End: 2022-12-27

## 2022-11-28 RX ORDER — DONEPEZIL HYDROCHLORIDE 10 MG/1
3 TABLET, FILM COATED ORAL
Qty: 90 | Refills: 0
Start: 2022-11-28 | End: 2022-12-27

## 2022-11-28 RX ORDER — QUETIAPINE FUMARATE 200 MG/1
1 TABLET, FILM COATED ORAL
Qty: 0 | Refills: 0 | DISCHARGE
Start: 2022-11-28

## 2022-11-28 RX ORDER — AMLODIPINE BESYLATE 2.5 MG/1
1 TABLET ORAL
Qty: 0 | Refills: 0 | DISCHARGE

## 2022-11-28 RX ADMIN — SODIUM CHLORIDE 1000 MILLILITER(S): 9 INJECTION INTRAMUSCULAR; INTRAVENOUS; SUBCUTANEOUS at 13:06

## 2022-11-28 RX ADMIN — Medication 81 MILLIGRAM(S): at 12:17

## 2022-11-28 RX ADMIN — HEPARIN SODIUM 5000 UNIT(S): 5000 INJECTION INTRAVENOUS; SUBCUTANEOUS at 05:39

## 2022-11-28 RX ADMIN — AMLODIPINE BESYLATE 2.5 MILLIGRAM(S): 2.5 TABLET ORAL at 05:39

## 2022-11-28 RX ADMIN — SODIUM CHLORIDE 250 MILLILITER(S): 9 INJECTION INTRAMUSCULAR; INTRAVENOUS; SUBCUTANEOUS at 12:16

## 2022-11-28 RX ADMIN — MEMANTINE HYDROCHLORIDE 10 MILLIGRAM(S): 10 TABLET ORAL at 12:17

## 2022-11-28 NOTE — DISCHARGE NOTE NURSING/CASE MANAGEMENT/SOCIAL WORK - NSDCPEFALRISK_GEN_ALL_CORE
For information on Fall & Injury Prevention, visit: [email or web address] OR  [email or web address] OR  https://www.cdc.gov/steadi/patient.html

## 2022-11-28 NOTE — DISCHARGE NOTE PROVIDER - NSDCCPCAREPLAN_GEN_ALL_CORE_FT
PRINCIPAL DISCHARGE DIAGNOSIS  Diagnosis: AMS (altered mental status)  Assessment and Plan of Treatment: 86 yo male w/ pmhx of dementia w/ behavioral disturbances, HTN, prostate cancer on lupron admitted to [facility] for acute agitated delirium  PSYCHIATRY/NEUROLOGY  # dementia w/ behavioral disturbances  # acute agitated delirium  - unclear etiology, possibly secondary to progressive dementia vs URI/INFECTIOUS DISEASE  - per pt's wife at bedside, pt had similar event in the past due to aspiration PNA  -   mild leukocytosis resolved   - on seroquel, memantine, aricept  CARDIOLOGY  # HTN  - norvasc  HEME/ONC  # prostate cancer  - gets lupron injection every 3 months  Pt to be discharged to rehab. Follow up with PMD 2 weeks.

## 2022-11-28 NOTE — DISCHARGE NOTE PROVIDER - NSDCFUSCHEDAPPT_GEN_ALL_CORE_FT
[facility]  UROLOGY [address] [place] [address] R  Scheduled Appointment: 01/04/2023    Dominique Villa  [facility]  UROLOGY [address] [place] [address] R  Scheduled Appointment: 01/04/2023

## 2022-11-28 NOTE — DISCHARGE NOTE PROVIDER - NSDCMRMEDTOKEN_GEN_ALL_CORE_FT
amLODIPine 2.5 mg oral tablet: 1 tab(s) orally once a day  aspirin 81 mg oral tablet, chewable: 1 tab(s) orally once a day  atorvastatin 40 mg oral tablet: 1 tab(s) orally once a day (at bedtime)  donepezil 5 mg oral tablet: 3 tab(s) orally once a day (at bedtime)  memantine 10 mg oral tablet: 1 tab(s) orally once a day  QUEtiapine 25 mg oral tablet: 1 tab(s) orally once a day (at bedtime)

## 2022-11-28 NOTE — BH CONSULTATION LIAISON PROGRESS NOTE - NSBHCHARTREVIEWLAB_PSY_A_CORE FT
no new labs
11-25    138  |  109<H>  |  21  ----------------------------<  185<H>  4.3   |  24  |  1.07    Ca    9.1      25 Nov 2022 10:30    
11-23    140  |  110<H>  |  20  ----------------------------<  93  3.8   |  24  |  0.84    Ca    8.8      23 Nov 2022 05:45  Phos  3.4     11-23  Mg     2.1     11-23    TPro  6.0  /  Alb  2.6<L>  /  TBili  0.4  /  DBili  x   /  AST  34  /  ALT  30  /  AlkPhos  93  11-23

## 2022-11-28 NOTE — BH CONSULTATION LIAISON PROGRESS NOTE - NSBHCONSULTRECOMMENDOTHER_PSY_A_CORE FT
11/23/22: Writer called pt's pharmacy at [facility] [phone]: Neurologist Arvin Espinoza MD prescribes the Aricept which was started as the orally dissolvable tab on 09/2020 and changed to the regular tab on 06/2021. Kept at 10mg. Family Medicine physician Rebecca Bustos MD prescribes the Namenda which was started at 5mg on 02/2021 and increased to 10mg on 09/22.  - increase Aricept from 10mg PO qhs to 15mg PO qhs (has not been increased in 1 year; discussed with Pt's spouse Maggie); continue Namenda 10mg PO qhs as it was just increased last month  - Patient does not have capacity to make his medical decisions; decision-maker is his wife Maggie   11/25/22: continue Aricept 15mg PO qhs, Namenda 10mg PO in am, Seroquel 25mg PO qhs + low dose PRNs  - consider GOC conversation with spouse given advancing neurodegenerative process for which there is no formal treatment

## 2022-11-28 NOTE — BH CONSULTATION LIAISON PROGRESS NOTE - NSBHCHARTREVIEWVS_PSY_A_CORE FT
Vital Signs Last 24 Hrs  T(C): 36.4 (23 Nov 2022 05:46), Max: 36.6 (22 Nov 2022 17:22)  T(F): 97.5 (23 Nov 2022 05:46), Max: 97.9 (22 Nov 2022 17:22)  HR: 60 (23 Nov 2022 05:46) (60 - 77)  BP: 136/79 (23 Nov 2022 05:46) (127/83 - 151/77)  BP(mean): --  RR: 18 (23 Nov 2022 05:46) (18 - 18)  SpO2: 95% (23 Nov 2022 05:46) (95% - 95%)    Parameters below as of 22 Nov 2022 17:22  Patient On (Oxygen Delivery Method): room air    
Vital Signs Last 24 Hrs  T(C): 36.6 (28 Nov 2022 11:11), Max: 36.6 (28 Nov 2022 11:11)  T(F): 97.8 (28 Nov 2022 11:11), Max: 97.8 (28 Nov 2022 11:11)  HR: 91 (28 Nov 2022 11:11) (70 - 92)  BP: 90/58 (28 Nov 2022 11:11) (90/58 - 150/85)  BP(mean): --  RR: 18 (28 Nov 2022 11:11) (18 - 18)  SpO2: 92% (28 Nov 2022 11:11) (92% - 98%)    Parameters below as of 28 Nov 2022 11:11  Patient On (Oxygen Delivery Method): room air    
Vital Signs Last 24 Hrs  T(C): 36.4 (25 Nov 2022 11:47), Max: 36.7 (25 Nov 2022 00:08)  T(F): 97.5 (25 Nov 2022 11:47), Max: 98 (25 Nov 2022 00:08)  HR: 79 (25 Nov 2022 11:47) (79 - 97)  BP: 95/62 (25 Nov 2022 11:47) (95/53 - 155/82)  BP(mean): --  RR: 17 (25 Nov 2022 11:47) (17 - 20)  SpO2: 94% (25 Nov 2022 11:47) (93% - 95%)    Parameters below as of 25 Nov 2022 05:20  Patient On (Oxygen Delivery Method): room air

## 2022-11-28 NOTE — BH CONSULTATION LIAISON PROGRESS NOTE - NSBHATTESTBILLINGAW_PSY_A_CORE
[id number] Inpatient care - low complexity - 15 minutes
[id number]-Pnlfrwwqjm Inpatient care - low complexity - 15 minutes
[id number]-Imqnoazhir Inpatient care - low complexity - 15 minutes

## 2022-11-28 NOTE — BH CONSULTATION LIAISON PROGRESS NOTE - CURRENT MEDICATION
MEDICATIONS  (STANDING):  amLODIPine   Tablet 2.5 milliGRAM(s) Oral daily  aspirin  chewable 81 milliGRAM(s) Oral daily  atorvastatin 40 milliGRAM(s) Oral at bedtime  donepezil 15 milliGRAM(s) Oral at bedtime  heparin   Injectable 5000 Unit(s) SubCutaneous every 12 hours  memantine 10 milliGRAM(s) Oral daily  QUEtiapine 25 milliGRAM(s) Oral at bedtime    MEDICATIONS  (PRN):  guaiFENesin Oral Liquid (Sugar-Free) 100 milliGRAM(s) Oral every 6 hours PRN Cough  haloperidol    Injectable 2 milliGRAM(s) IntraMuscular every 8 hours PRN agitation  QUEtiapine 12.5 milliGRAM(s) Oral three times a day PRN mild agitation / give 30 minutes prior to ADL assistance  
MEDICATIONS  (STANDING):  amLODIPine   Tablet 2.5 milliGRAM(s) Oral daily  aspirin  chewable 81 milliGRAM(s) Oral daily  atorvastatin 40 milliGRAM(s) Oral at bedtime  cefTRIAXone   IVPB 1000 milliGRAM(s) IV Intermittent every 24 hours  dextrose 5% + sodium chloride 0.45% with potassium chloride 20 mEq/L 1000 milliLiter(s) (75 mL/Hr) IV Continuous <Continuous>  donepezil 10 milliGRAM(s) Oral at bedtime  heparin   Injectable 5000 Unit(s) SubCutaneous every 12 hours  memantine 10 milliGRAM(s) Oral at bedtime  QUEtiapine 25 milliGRAM(s) Oral at bedtime    MEDICATIONS  (PRN):  haloperidol    Injectable 2 milliGRAM(s) IntraMuscular every 8 hours PRN agitation  QUEtiapine 12.5 milliGRAM(s) Oral three times a day PRN mild agitation / give 30 minutes prior to ADL assistance  
MEDICATIONS  (STANDING):  amLODIPine   Tablet 2.5 milliGRAM(s) Oral daily  aspirin  chewable 81 milliGRAM(s) Oral daily  atorvastatin 40 milliGRAM(s) Oral at bedtime  cefTRIAXone   IVPB 1000 milliGRAM(s) IV Intermittent every 24 hours  dextrose 5% + sodium chloride 0.45% with potassium chloride 20 mEq/L 1000 milliLiter(s) (75 mL/Hr) IV Continuous <Continuous>  donepezil 15 milliGRAM(s) Oral at bedtime  heparin   Injectable 5000 Unit(s) SubCutaneous every 12 hours  memantine 10 milliGRAM(s) Oral daily  QUEtiapine 25 milliGRAM(s) Oral at bedtime    MEDICATIONS  (PRN):  haloperidol    Injectable 2 milliGRAM(s) IntraMuscular every 8 hours PRN agitation  QUEtiapine 12.5 milliGRAM(s) Oral three times a day PRN mild agitation / give 30 minutes prior to ADL assistance

## 2022-11-28 NOTE — DISCHARGE NOTE PROVIDER - HOSPITAL COURSE
86 yo male w/ pmhx of dementia w/ behavioral disturbances, HTN, prostate cancer on lupron admitted to [facility] for acute agitated delirium    PSYCHIATRY/NEUROLOGY  # dementia w/ behavioral disturbances  # acute agitated delirium  - unclear etiology, possibly secondary to progressive dementia vs URI/INFECTIOUS DISEASE  - per pt's wife at bedside, pt had similar event in the past due to aspiration PNA  -   mild leukocytosis resolved   - on seroquel, memantine, aricept    CARDIOLOGY  # HTN  - norvasc    HEME/ONC  # prostate cancer  - gets lupron injection every 3 months    Pt to be discharged to rehab. Follow up with PMD 2 weeks.

## 2022-11-28 NOTE — BH CONSULTATION LIAISON PROGRESS NOTE - NSBHFUPINTERVALHXFT_PSY_A_CORE
[facility]. Otherwise no significant interval events. Patient thus far tolerating the increased Aricept dose of 15mg PO qhs; too early to ascertain clinical response (~ 2-3 weeks). Patient did not need any PRNs since last seen; remains calm, cooperative, no combative behavior. Medication compliant. Same clinical presentation which is highly likely Pt's baseline. Obtained collateral from his pharmacy and his wife Maggie on 11/23/22.

## 2022-11-28 NOTE — DISCHARGE NOTE NURSING/CASE MANAGEMENT/SOCIAL WORK - PATIENT PORTAL LINK FT
You can access the FollowMyHealth Patient Portal offered by [facility] by registering at the following website: [email or web address]. By joining [facility]’s FollowMyHealth portal, you will also be able to view your health information using other applications (apps) compatible with our system.

## 2022-11-28 NOTE — BH CONSULTATION LIAISON PROGRESS NOTE - NSBHMSESPEECH_PSY_A_CORE
Non-verbal: unable to assess speech further

## 2022-11-28 NOTE — BH CONSULTATION LIAISON PROGRESS NOTE - NSBHFUPREASONCONS_PSY_A_CORE
dementia/agitation/med management

## 2022-11-28 NOTE — BH CONSULTATION LIAISON PROGRESS NOTE - NSBHASSESSMENTFT_PSY_ALL_CORE
88 yo male with advancing Alzheimer's Dementia, minimally verbal, with need-based Behavioral Disturbances, minimal verbal ability hence not able to communicate his needs, during ADL assistance, already on Aricept 10mg PO qhs and Namenda 10mg PO qd, + Seroquel 25mg PO qhs. Aricept increased to 15mg qd at VS with addition of daytime Seroquel 12.5mg PO PRNs. 
86 yo male with advancing Alzheimer's Dementia, minimally verbal, with need-based Behavioral Disturbances, minimal verbal ability hence not able to communicate his needs, during ADL assistance, already on Aricept 10mg PO qhs and Namenda 10mg PO qd, + Seroquel 25mg PO qhs.
88 yo male with advancing Alzheimer's Dementia, minimally verbal, with need-based Behavioral Disturbances, minimal verbal ability hence not able to communicate his needs, during ADL assistance, already on Aricept 10mg PO qhs and Namenda 10mg PO qd, + Seroquel 25mg PO qhs.

## 2022-11-28 NOTE — BH CONSULTATION LIAISON PROGRESS NOTE - OTHER
deferred at this time  adequate  does not look distressed or uncomfortable  not establishing meaningful eye contact and with minimal response even to his name being called.  impaired / very limited

## 2022-11-28 NOTE — BH CONSULTATION LIAISON PROGRESS NOTE - NSBHCONSULTFOLLOWAFTERCARE_PSY_A_CORE FT
as per Primary team 
likely placement 
continue Aricept 15mg PO qhs (can increase to 10mg BID on 12/23/22 as tolerated), Namenda 10mg PO in am, Seroquel 25mg PO qhs + 12.5mg PO up to TID PRNs

## 2022-11-28 NOTE — BH CONSULTATION LIAISON PROGRESS NOTE - NSICDXBHPRIMARYDX_PSY_ALL_CORE
Alzheimer's dementia with behavioral disturbance   G30.9  

## 2022-12-02 DIAGNOSIS — G30.9 ALZHEIMER'S DISEASE, UNSPECIFIED: ICD-10-CM

## 2022-12-02 DIAGNOSIS — F02.811 DEMENTIA IN OTHER DISEASES CLASSIFIED ELSEWHERE, UNSPECIFIED SEVERITY, WITH AGITATION: ICD-10-CM

## 2022-12-02 DIAGNOSIS — I10 ESSENTIAL (PRIMARY) HYPERTENSION: ICD-10-CM

## 2022-12-02 DIAGNOSIS — Z85.46 PERSONAL HISTORY OF MALIGNANT NEOPLASM OF PROSTATE: ICD-10-CM

## 2022-12-02 DIAGNOSIS — Z20.822 CONTACT WITH AND (SUSPECTED) EXPOSURE TO COVID-19: ICD-10-CM

## 2022-12-02 DIAGNOSIS — E43 UNSPECIFIED SEVERE PROTEIN-CALORIE MALNUTRITION: ICD-10-CM

## 2022-12-14 NOTE — ED PROVIDER NOTE - INTERNATIONAL TRAVEL
Pt is present on the milieu and social with peers  He consumed 100% of breakfast and lunch  Took his medications without incidence  Lidocaine patches applied to back at 1112  Accuchecks 78 and 78; insulin held  Pt reported he is "happy " Denied all psychiatric symptoms  He spends time on milieu walking and occasionally jogging  He appears tired at times  When this writer encouraged pt to sleep he stated "no sleep " No behavioral issues  No

## 2023-02-15 ENCOUNTER — APPOINTMENT (OUTPATIENT)
Dept: UROLOGY | Facility: CLINIC | Age: 88
End: 2023-02-15
Payer: MEDICARE

## 2023-02-15 ENCOUNTER — OUTPATIENT (OUTPATIENT)
Dept: OUTPATIENT SERVICES | Facility: HOSPITAL | Age: 88
LOS: 1 days | End: 2023-02-15
Payer: MEDICARE

## 2023-02-15 VITALS — DIASTOLIC BLOOD PRESSURE: 96 MMHG | HEART RATE: 86 BPM | SYSTOLIC BLOOD PRESSURE: 128 MMHG

## 2023-02-15 DIAGNOSIS — Z98.89 OTHER SPECIFIED POSTPROCEDURAL STATES: Chronic | ICD-10-CM

## 2023-02-15 DIAGNOSIS — Z96.0 PRESENCE OF UROGENITAL IMPLANTS: Chronic | ICD-10-CM

## 2023-02-15 DIAGNOSIS — Z96.652 PRESENCE OF LEFT ARTIFICIAL KNEE JOINT: Chronic | ICD-10-CM

## 2023-02-15 DIAGNOSIS — R97.20 ELEVATED PROSTATE SPECIFIC ANTIGEN [PSA]: ICD-10-CM

## 2023-02-15 DIAGNOSIS — R35.0 FREQUENCY OF MICTURITION: ICD-10-CM

## 2023-02-15 DIAGNOSIS — C61 MALIGNANT NEOPLASM OF PROSTATE: ICD-10-CM

## 2023-02-15 PROCEDURE — 99214 OFFICE O/P EST MOD 30 MIN: CPT

## 2023-02-15 PROCEDURE — 96402 CHEMO HORMON ANTINEOPL SQ/IM: CPT

## 2023-02-15 RX ORDER — LEUPROLIDE ACETATE 30 MG
30 KIT INTRAMUSCULAR
Qty: 1 | Refills: 0 | Status: COMPLETED | OUTPATIENT
Start: 2017-04-18 | End: 2023-02-15

## 2023-02-15 RX ORDER — LEUPROLIDE ACETATE 22.5 MG
22.5 KIT INTRAMUSCULAR
Qty: 1 | Refills: 4 | Status: COMPLETED | COMMUNITY
Start: 2021-03-15 | End: 2023-02-15

## 2023-02-15 RX ORDER — LEUPROLIDE ACETATE 22.5 MG
22.5 KIT INTRAMUSCULAR
Qty: 1 | Refills: 4 | Status: COMPLETED | COMMUNITY
Start: 2020-01-08 | End: 2023-02-15

## 2023-02-15 RX ORDER — LEUPROLIDE ACETATE 22.5 MG
22.5 KIT INTRAMUSCULAR
Qty: 1 | Refills: 0 | Status: COMPLETED | OUTPATIENT
Start: 2023-02-15

## 2023-02-15 RX ORDER — LEUPROLIDE ACETATE 22.5 MG
22.5 KIT INTRAMUSCULAR
Qty: 1 | Refills: 3 | Status: COMPLETED | COMMUNITY
Start: 2021-02-18 | End: 2023-02-15

## 2023-02-15 RX ORDER — LEUPROLIDE ACETATE 22.5 MG
22.5 KIT INTRAMUSCULAR
Qty: 1 | Refills: 3 | Status: COMPLETED | COMMUNITY
Start: 2023-02-06 | End: 2023-02-15

## 2023-02-15 RX ADMIN — LEUPROLIDE ACETATE 0 MG: KIT at 00:00

## 2023-02-16 LAB
PSA FREE FLD-MCNC: 6 %
PSA FREE SERPL-MCNC: 0.22 NG/ML
PSA SERPL-MCNC: 3.69 NG/ML

## 2023-05-17 ENCOUNTER — APPOINTMENT (OUTPATIENT)
Dept: UROLOGY | Facility: CLINIC | Age: 88
End: 2023-05-17

## 2023-05-30 NOTE — PHYSICAL EXAM
[General Appearance - Well Developed] : well developed [General Appearance - Well Nourished] : well nourished [Normal Appearance] : normal appearance [Well Groomed] : well groomed [General Appearance - In No Acute Distress] : no acute distress [Respiration, Rhythm And Depth] : normal respiratory rhythm and effort [Edema] : no peripheral edema [Abdomen Soft] : soft [Exaggerated Use Of Accessory Muscles For Inspiration] : no accessory muscle use [Costovertebral Angle Tenderness] : no ~M costovertebral angle tenderness [Abdomen Tenderness] : non-tender [Urethral Meatus] : meatus normal [Urinary Bladder Findings] : the bladder was normal on palpation [Scrotum] : the scrotum was normal [No Prostate Nodules] : no prostate nodules [Testes Mass (___cm)] : there were no testicular masses [] : no rash [Normal Station and Gait] : the gait and station were normal for the patient's age [No Focal Deficits] : no focal deficits [Oriented To Time, Place, And Person] : oriented to person, place, and time [Mood] : the mood was normal [Affect] : the affect was normal [Not Anxious] : not anxious [No Palpable Adenopathy] : no palpable adenopathy n/a

## 2023-07-06 ENCOUNTER — APPOINTMENT (OUTPATIENT)
Dept: UROLOGY | Facility: CLINIC | Age: 88
End: 2023-07-06
Payer: MEDICARE

## 2023-07-06 VITALS
HEART RATE: 87 BPM | TEMPERATURE: 98.4 F | DIASTOLIC BLOOD PRESSURE: 80 MMHG | SYSTOLIC BLOOD PRESSURE: 141 MMHG | OXYGEN SATURATION: 96 %

## 2023-07-06 PROCEDURE — 99215 OFFICE O/P EST HI 40 MIN: CPT

## 2023-07-06 RX ORDER — DONEPEZIL HYDROCHLORIDE 10 MG/1
10 TABLET ORAL
Qty: 14 | Refills: 0 | Status: ACTIVE | COMMUNITY
Start: 2022-06-16

## 2023-07-06 RX ORDER — ESCITALOPRAM OXALATE 5 MG/1
5 TABLET ORAL
Qty: 14 | Refills: 0 | Status: DISCONTINUED | COMMUNITY
Start: 2022-06-16 | End: 2023-07-06

## 2023-07-06 RX ORDER — QUETIAPINE FUMARATE 25 MG/1
25 TABLET ORAL
Qty: 14 | Refills: 0 | Status: DISCONTINUED | COMMUNITY
Start: 2022-06-16 | End: 2023-07-06

## 2023-07-06 RX ORDER — AMLODIPINE BESYLATE 2.5 MG/1
2.5 TABLET ORAL
Qty: 14 | Refills: 0 | Status: DISCONTINUED | COMMUNITY
Start: 2022-06-16 | End: 2023-07-06

## 2023-07-06 RX ORDER — OXYBUTYNIN CHLORIDE 5 MG/1
5 TABLET ORAL TWICE DAILY
Qty: 180 | Refills: 0 | Status: DISCONTINUED | COMMUNITY
Start: 2020-12-21 | End: 2023-07-06

## 2023-07-06 RX ORDER — KETOCONAZOLE 20 MG/G
2 CREAM TOPICAL TWICE DAILY
Qty: 1 | Refills: 11 | Status: DISCONTINUED | COMMUNITY
Start: 2020-11-12 | End: 2023-07-06

## 2023-07-06 RX ORDER — DONEPEZIL HYDROCHLORIDE 10 MG/1
10 TABLET, ORALLY DISINTEGRATING ORAL
Qty: 90 | Refills: 0 | Status: DISCONTINUED | COMMUNITY
Start: 2022-04-26 | End: 2023-07-06

## 2023-07-06 RX ORDER — OXYBUTYNIN CHLORIDE 5 MG/1
5 TABLET ORAL TWICE DAILY
Qty: 180 | Refills: 0 | Status: DISCONTINUED | COMMUNITY
Start: 2021-03-16 | End: 2023-07-06

## 2023-07-06 RX ORDER — NYSTATIN 100000 U/G
100000 OINTMENT TOPICAL 3 TIMES DAILY
Qty: 1 | Refills: 1 | Status: ACTIVE | COMMUNITY
Start: 2023-07-06

## 2023-07-06 RX ORDER — LORAZEPAM 0.5 MG/1
0.5 TABLET ORAL
Refills: 0 | Status: ACTIVE | COMMUNITY

## 2023-07-06 NOTE — REASON FOR VISIT
[Initial Visit ___] : [unfilled] is here today for an initial visit  for [unfilled] [Spouse] : spouse [Formal Caregiver] : formal caregiver

## 2023-07-06 NOTE — REVIEW OF SYSTEMS
[Constipation] : constipation [Seen by urologist before (Name)  ___] : Preciously seen by a urologist: [unfilled] [Joint Pain] : joint pain [Difficulty Walking] : difficulty walking [Negative] : Heme/Lymph [see HPI] : see HPI

## 2023-07-06 NOTE — PHYSICAL EXAM
[General Appearance - Well Developed] : well developed [General Appearance - Well Nourished] : well nourished [Normal Appearance] : normal appearance [Well Groomed] : well groomed [General Appearance - In No Acute Distress] : no acute distress [Edema] : no peripheral edema [Respiration, Rhythm And Depth] : normal respiratory rhythm and effort [Exaggerated Use Of Accessory Muscles For Inspiration] : no accessory muscle use [Abdomen Soft] : soft [Abdomen Tenderness] : non-tender [Costovertebral Angle Tenderness] : no ~M costovertebral angle tenderness [Urethral Meatus] : meatus normal [Penis Abnormality] : normal uncircumcised penis [Urinary Bladder Findings] : the bladder was normal on palpation [Scrotum] : the scrotum was normal [Epididymis] : the epididymides were normal [Testes Tenderness] : no tenderness of the testes [FreeTextEntry1] : wheel chair bound [] : no rash [No Focal Deficits] : no focal deficits [Oriented To Time, Place, And Person] : oriented to person, place, and time [Affect] : the affect was normal [Mood] : the mood was normal [Not Anxious] : not anxious [No Palpable Adenopathy] : no palpable adenopathy

## 2023-07-06 NOTE — LETTER BODY
[Dear  ___] : Dear  [unfilled], [Consult Letter:] : I had the pleasure of evaluating your patient, [unfilled]. [Please see my note below.] : Please see my note below. [Consult Closing:] : Thank you very much for allowing me to participate in the care of this patient.  If you have any questions, please do not hesitate to contact me. [Sincerely,] : Sincerely, [FreeTextEntry3] : Clint Hunt MD\par

## 2023-07-06 NOTE — HISTORY OF PRESENT ILLNESS
[FreeTextEntry1] : 07/06/2023: Mr. AGUSTIN is a 88 year male who presents today for Balanitis: : Sent from [facility]. \par \par Balanitis: red, inflamed foreskin. \par Difficult to pull back because of phimosis.  \par Prescribed nystatin ointment BID. \par Advised to keep area dry and check for diabetes.  \par \par Ca Prostate \par perineal radical prostatectomy 1994\par Adjuvant radiotherapy\par Artificial Sphincter 1996\par On Hormones for Elevated PSA\par PSA:\par 05/15/2019: 1.34 ng/mL\par 08/14/2019: 1.31 ng/mL\par 02/12/2020: 2.12 ng/mL\par 05/13/2020: 2.14 ng/mL\par 08/13/2020: 1.79 ng/mL\par 11/12/2020: 2.01 ng/mL\par 06/10/2021: 2.02 ng/mL\par 12/15/2021: 2.71 ng/mL\par 06/23/2022: 3.65 ng/mL\par 02/15/2023: 3.59 ng/mL\par Last injection: Lupron 22.5 mg 02/15/2023.\par Advised to get next injection\par \par Discussed with Dr Floyd. \par RTC: 2 weeks to see effects of nystatin \par

## 2023-07-25 ENCOUNTER — APPOINTMENT (OUTPATIENT)
Dept: UROLOGY | Facility: CLINIC | Age: 88
End: 2023-07-25
Payer: MEDICARE

## 2023-07-25 VITALS
DIASTOLIC BLOOD PRESSURE: 66 MMHG | OXYGEN SATURATION: 99 % | HEART RATE: 85 BPM | TEMPERATURE: 97.4 F | SYSTOLIC BLOOD PRESSURE: 113 MMHG

## 2023-07-25 PROCEDURE — 99214 OFFICE O/P EST MOD 30 MIN: CPT

## 2023-07-25 NOTE — HISTORY OF PRESENT ILLNESS
[FreeTextEntry1] : 07/06/2023: Mr. AGUSTIN is a 88 year male who presents today for Balanitis: : Sent from [facility]. \par \par Balanitis: red, inflamed foreskin. \par Difficult to pull back because of phimosis.  \par Prescribed nystatin ointment BID. \par Advised to keep area dry and check for diabetes.  \par \par Ca Prostate \par perineal radical prostatectomy 1994\par Adjuvant radiotherapy\par Artificial Sphincter 1996\par On Hormones for Elevated PSA\par PSA:\par 05/15/2019: 1.34 ng/mL\par 08/14/2019: 1.31 ng/mL\par 02/12/2020: 2.12 ng/mL\par 05/13/2020: 2.14 ng/mL\par 08/13/2020: 1.79 ng/mL\par 11/12/2020: 2.01 ng/mL\par 06/10/2021: 2.02 ng/mL\par 12/15/2021: 2.71 ng/mL\par 06/23/2022: 3.65 ng/mL\par 02/15/2023: 3.59 ng/mL\par Last injection: Lupron 22.5 mg 02/15/2023.\par Advised to get next injection\par \par Discussed with Dr Floyd. \par RTC: 2 weeks to see effects of nystatin \par \par \par \par \par 07/25/2023: Mr. AGUSTIN is a 88 year male who presents today for a follow up for Balanitis and Ca Prostate \par \par Balanitis: was treated with Nystatin 3 weeks ago. No significant change. \par Will continue using Nystatin and f/u in 2 weeks to see progression. \par \par Ca Prostate: \par \par perineal radical prostatectomy 1994\par Adjuvant radiotherapy\par Artificial Sphincter 1996\par On Hormones for Elevated PSA\par PSA:\par 05/15/2019: 1.34 ng/mL\par 08/14/2019: 1.31 ng/mL\par 02/12/2020: 2.12 ng/mL\par 05/13/2020: 2.14 ng/mL\par 08/13/2020: 1.79 ng/mL\par 11/12/2020: 2.01 ng/mL\par 06/10/2021: 2.02 ng/mL\par 12/15/2021: 2.71 ng/mL\par 06/23/2022: 3.65 ng/mL\par 02/15/2023: 3.59 ng/mL\par Last injection: Lupron 22.5 mg 02/15/2023.\par Advised to get next injection\par Discussed with Dr. Floyd last visit. \par \par RTC: 2 weeks to see progression of Balanitis \par

## 2023-07-25 NOTE — REASON FOR VISIT
[Follow-up Visit ___] : a follow-up visit  for [unfilled] [Spouse] : spouse [Formal Caregiver] : formal caregiver [Initial Visit ___] : [unfilled] is here today for an initial visit  for [unfilled]

## 2023-07-25 NOTE — REVIEW OF SYSTEMS
[Constipation] : constipation [see HPI] : see HPI [Seen by urologist before (Name)  ___] : Preciously seen by a urologist: [unfilled] [Joint Pain] : joint pain [Difficulty Walking] : difficulty walking [Negative] : Heme/Lymph

## 2023-07-25 NOTE — PHYSICAL EXAM
[General Appearance - Well Developed] : well developed [General Appearance - Well Nourished] : well nourished [Normal Appearance] : normal appearance [Well Groomed] : well groomed [General Appearance - In No Acute Distress] : no acute distress [Abdomen Soft] : soft [Abdomen Tenderness] : non-tender [Costovertebral Angle Tenderness] : no ~M costovertebral angle tenderness [Urethral Meatus] : meatus normal [Penis Abnormality] : normal uncircumcised penis [Urinary Bladder Findings] : the bladder was normal on palpation [Scrotum] : the scrotum was normal [Epididymis] : the epididymides were normal [Testes Tenderness] : no tenderness of the testes [Edema] : no peripheral edema [] : no respiratory distress [Respiration, Rhythm And Depth] : normal respiratory rhythm and effort [Exaggerated Use Of Accessory Muscles For Inspiration] : no accessory muscle use [Oriented To Time, Place, And Person] : oriented to person, place, and time [Affect] : the affect was normal [Mood] : the mood was normal [Not Anxious] : not anxious [No Focal Deficits] : no focal deficits [No Palpable Adenopathy] : no palpable adenopathy [FreeTextEntry1] : wheel chair bound

## 2023-08-08 ENCOUNTER — APPOINTMENT (OUTPATIENT)
Dept: UROLOGY | Facility: CLINIC | Age: 88
End: 2023-08-08
Payer: MEDICARE

## 2023-08-08 VITALS
WEIGHT: 119 LBS | TEMPERATURE: 97.6 F | DIASTOLIC BLOOD PRESSURE: 77 MMHG | HEART RATE: 81 BPM | BODY MASS INDEX: 18.09 KG/M2 | SYSTOLIC BLOOD PRESSURE: 132 MMHG | OXYGEN SATURATION: 96 %

## 2023-08-08 DIAGNOSIS — F41.9 ANXIETY DISORDER, UNSPECIFIED: ICD-10-CM

## 2023-08-08 DIAGNOSIS — N39.46 MIXED INCONTINENCE: ICD-10-CM

## 2023-08-08 DIAGNOSIS — N48.1 BALANITIS: ICD-10-CM

## 2023-08-08 PROCEDURE — 99214 OFFICE O/P EST MOD 30 MIN: CPT

## 2023-08-08 NOTE — REASON FOR VISIT
[Follow-up Visit ___] : a follow-up visit  for [unfilled] [Spouse] : spouse [Formal Caregiver] : formal caregiver

## 2023-08-08 NOTE — HISTORY OF PRESENT ILLNESS
[FreeTextEntry1] : 07/06/2023: Mr. AGUSTIN is a 88 year male who presents today for Balanitis: : Sent from [facility].   Balanitis: red, inflamed foreskin.  Difficult to pull back because of phimosis.   Prescribed nystatin ointment BID.  Advised to keep area dry and check for diabetes.    Ca Prostate  perineal radical prostatectomy 1994 Adjuvant radiotherapy Artificial Sphincter 1996 On Hormones for Elevated PSA PSA: 05/15/2019: 1.34 ng/mL 08/14/2019: 1.31 ng/mL 02/12/2020: 2.12 ng/mL 05/13/2020: 2.14 ng/mL 08/13/2020: 1.79 ng/mL 11/12/2020: 2.01 ng/mL 06/10/2021: 2.02 ng/mL 12/15/2021: 2.71 ng/mL 06/23/2022: 3.65 ng/mL 02/15/2023: 3.59 ng/mL Last injection: Lupron 22.5 mg 02/15/2023. Advised to get next injection  Discussed with Dr Floyd.  RTC: 2 weeks to see effects of nystatin      07/25/2023: Mr. AGUSTIN is a 88 year male who presents today for a follow up for Balanitis and Ca Prostate   Balanitis: was treated with Nystatin 3 weeks ago. No significant change.  Will continue using Nystatin and f/u in 2 weeks to see progression.   Ca Prostate:   perineal radical prostatectomy 1994 Adjuvant radiotherapy Artificial Sphincter 1996 On Hormones for Elevated PSA PSA: 05/15/2019: 1.34 ng/mL 08/14/2019: 1.31 ng/mL 02/12/2020: 2.12 ng/mL 05/13/2020: 2.14 ng/mL 08/13/2020: 1.79 ng/mL 11/12/2020: 2.01 ng/mL 06/10/2021: 2.02 ng/mL 12/15/2021: 2.71 ng/mL 06/23/2022: 3.65 ng/mL 02/15/2023: 3.59 ng/mL Last injection: Lupron 22.5 mg 02/15/2023. Advised to get next injection Discussed with Dr. Floyd last visit.   RTC: 2 weeks to see progression of Balanitis     08/08/2023: Mr. AGUSTIN is a 88 year male who presents today for a follow up for Balanitis.  Here to see progression, two weeks ago no progression.  Has been using Nystatin for 5 weeks.  Incontinence, secondary to Radical Proctectomy and severe phimosis makes it worse.  Today looks much better.  Will continue Nystati   If there is no improvement, recommended to get circumcision.  The procedure, alternatives, benefits and risks were explained to the pt's wife.  Answered all of wife's concerns and questions. Will continue Nystatin and follow up PRN    Ca Prostate:  perineal radical prostatectomy 1994 Adjuvant radiotherapy Artificial Sphincter 1996 On Hormones for Elevated PSA PSA: 05/15/2019: 1.34 ng/mL 08/14/2019: 1.31 ng/mL 02/12/2020: 2.12 ng/mL 05/13/2020: 2.14 ng/mL 08/13/2020: 1.79 ng/mL 11/12/2020: 2.01 ng/mL 06/10/2021: 2.02 ng/mL 12/15/2021: 2.71 ng/mL 06/23/2022: 3.65 ng/mL 02/15/2023: 3.59 ng/mL Last injection: Lupron 22.5 mg 02/15/2023. Advised to get next injection Discussed with Dr. Floyd  RTC: PRN

## 2023-09-22 NOTE — DIETITIAN INITIAL EVALUATION ADULT - NS FNS DIET ORDER
PROVIDER:[TOKEN:[[id number]],FOLLOWUP:[2 weeks]]
Diet, DASH/TLC:   Sodium & Cholesterol Restricted  Easy to Chew (EASYTOCHEW) (11-21-22 @ 00:18) [Active]

## 2023-10-07 NOTE — BH CONSULTATION LIAISON PROGRESS NOTE - NSBHPROGSUIC_PSY_A_CORE
Pt has discharge order placed.  called and made aware. Transportation is being set up. Wife Jasmin Platt was called and notified by this nurse.
no

## 2023-11-02 NOTE — ASU DISCHARGE PLAN (ADULT/PEDIATRIC). - YOU WERE IN THE HOSPITAL FOR:
"Chief Complaint  Mood Disorder    Subjective    History of Present Illness      Patient presents to [facility] for   History of Present Illness  Pt is here today for 3 month Mood f/u.   Mother would like to discuss adding low dose Zoloft to his medication regimen. She is taking this combination with great improvement in symptoms.   She brings this up at this time as she has noticed some mood instability creeping back in, as well as depression symptoms.        Review of Systems    I have reviewed and agree with the HPI and ROS information as above.  Yeyo Gonzalez MD     Objective   Vital Signs:   BP (!) 125/82   Pulse (!) 94   Ht 179.1 cm (70.5\")   Wt 65.8 kg (145 lb)   SpO2 98%   BMI 20.51 kg/m²     38 %ile (Z= -0.31) based on [facility] (Boys, 2-20 Years) BMI-for-age based on BMI available as of 11/2/2023.     Physical Exam  Vitals and nursing note reviewed.   Constitutional:       Appearance: Normal appearance. He is well-developed.   HENT:      Head: Normocephalic and atraumatic.      Right Ear: Tympanic membrane, ear canal and external ear normal.      Left Ear: Tympanic membrane, ear canal and external ear normal.      Nose: Nose normal. No septal deviation, nasal tenderness or congestion.      Mouth/Throat:      Lips: Pink. No lesions.      Mouth: Mucous membranes are moist. No oral lesions.      Dentition: Normal dentition.      Pharynx: Oropharynx is clear. No pharyngeal swelling, oropharyngeal exudate or posterior oropharyngeal erythema.   Eyes:      General: Lids are normal. Vision grossly intact. No scleral icterus.        Right eye: No discharge.         Left eye: No discharge.      Extraocular Movements: Extraocular movements intact.      Conjunctiva/sclera: Conjunctivae normal.      Right eye: Right conjunctiva is not injected.      Left eye: Left conjunctiva is not injected.      Pupils: Pupils are equal, round, and reactive to light.   Neck:      Thyroid: No thyroid mass. "      Trachea: Trachea normal.   Cardiovascular:      Rate and Rhythm: Normal rate and regular rhythm.      Heart sounds: Normal heart sounds. No murmur heard.     No gallop.   Pulmonary:      Effort: Pulmonary effort is normal.      Breath sounds: Normal breath sounds and air entry. No wheezing, rhonchi or rales.   Abdominal:      General: There is no distension.      Palpations: Abdomen is soft. There is no mass.      Tenderness: There is no abdominal tenderness. There is no right CVA tenderness, left CVA tenderness, guarding or rebound.   Musculoskeletal:         General: No tenderness or deformity. Normal range of motion.      Cervical back: Full passive range of motion without pain, normal range of motion and neck supple.      Thoracic back: Normal.      Right lower leg: No edema.      Left lower leg: No edema.   Skin:     General: Skin is warm and dry.      Coloration: Skin is not jaundiced.      Findings: No rash.   Neurological:      Mental Status: He is alert and oriented to person, place, and time.      Sensory: Sensation is intact.      Motor: Motor function is intact.      Coordination: Coordination is intact.      Gait: Gait is intact.      Deep Tendon Reflexes: Reflexes are normal and symmetric.   Psychiatric:         Mood and Affect: Mood and affect normal.         Judgment: Judgment normal.               Result Review  Data Reviewed:                   Assessment and Plan      Diagnoses and all orders for this visit:    1. Disruptive mood dysregulation disorder (Primary)  Assessment & Plan:  We will continue Lamictal to add Latuda and see him back in 1 month.    Orders:  -     Lurasidone HCl (Latuda) 20 MG tablet tablet; Take 1 tablet by mouth Daily.  Dispense: 60 tablet; Refill: 0    2. Flu vaccine need  -     Fluzone >6 Months ([id number])    3. Need for HPV vaccine  -     HPV 9-Valent Recomb Vaccine suspension 0.5 mL            Follow Up   Return in about 4 weeks (around 11/30/2023) for  Artifical Urinary Sphincter explant and reimplant Recheck.  Patient was given instructions and counseling regarding his condition or for health maintenance advice. Please see specific information pulled into the AVS if appropriate.     Answers submitted by the patient for this visit:  Primary Reason for Visit (Submitted on 11/2/2023)  What is the primary reason for your visit?: Other  Other (Submitted on 11/2/2023)  Please describe your symptoms.: Med check  Have you had these symptoms before?: Yes  How long have you been having these symptoms?: Greater than 2 weeks

## 2024-01-05 NOTE — OCCUPATIONAL THERAPY INITIAL EVALUATION ADULT - SHORT TERM MEMORY, REHAB EVAL
LOV- 12/6/23    RTC- 3 months    F/U- 2/7/24    Last Refills- 7/25/23    Last Labs- 12/6/23   impaired

## 2024-01-30 NOTE — OCCUPATIONAL THERAPY INITIAL EVALUATION ADULT - BALANCE DISTURBANCE, IDENTIFIED IMPAIRMENT CONTRIBUTE, REHAB EVAL
Please advise on ferrous sulfate dose.  325mg daily?   impaired coordination/impaired motor control/abnormal muscle tone/impaired postural control/decreased ROM/decreased sensation/impaired sensory feedback/decreased strength

## 2024-06-18 ENCOUNTER — APPOINTMENT (OUTPATIENT)
Dept: UROLOGY | Facility: CLINIC | Age: 89
End: 2024-06-18
Payer: MEDICARE

## 2024-06-18 VITALS
HEART RATE: 79 BPM | SYSTOLIC BLOOD PRESSURE: 121 MMHG | DIASTOLIC BLOOD PRESSURE: 74 MMHG | OXYGEN SATURATION: 98 % | TEMPERATURE: 96.3 F

## 2024-06-18 DIAGNOSIS — C61 MALIGNANT NEOPLASM OF PROSTATE: ICD-10-CM

## 2024-06-18 DIAGNOSIS — R32 UNSPECIFIED URINARY INCONTINENCE: ICD-10-CM

## 2024-06-18 PROCEDURE — 99214 OFFICE O/P EST MOD 30 MIN: CPT

## 2024-06-18 NOTE — HISTORY OF PRESENT ILLNESS
[FreeTextEntry1] : 07/06/2023: Mr. AGUSTIN is a 88 year male who presents today for Balanitis: : Sent from [facility].   Balanitis: red, inflamed foreskin.  Difficult to pull back because of phimosis.   Prescribed nystatin ointment BID.  Advised to keep area dry and check for diabetes.    Ca Prostate  perineal radical prostatectomy 1994 Adjuvant radiotherapy Artificial Sphincter 1996 On Hormones for Elevated PSA PSA: 05/15/2019: 1.34 ng/mL 08/14/2019: 1.31 ng/mL 02/12/2020: 2.12 ng/mL 05/13/2020: 2.14 ng/mL 08/13/2020: 1.79 ng/mL 11/12/2020: 2.01 ng/mL 06/10/2021: 2.02 ng/mL 12/15/2021: 2.71 ng/mL 06/23/2022: 3.65 ng/mL 02/15/2023: 3.59 ng/mL Last injection: Lupron 22.5 mg 02/15/2023. Advised to get next injection  Discussed with Dr Floyd.  RTC: 2 weeks to see effects of nystatin      07/25/2023: Mr. AGUSTIN is a 88 year male who presents today for a follow up for Balanitis and Ca Prostate   Balanitis: was treated with Nystatin 3 weeks ago. No significant change.  Will continue using Nystatin and f/u in 2 weeks to see progression.   Ca Prostate:   perineal radical prostatectomy 1994 Adjuvant radiotherapy Artificial Sphincter 1996 On Hormones for Elevated PSA PSA: 05/15/2019: 1.34 ng/mL 08/14/2019: 1.31 ng/mL 02/12/2020: 2.12 ng/mL 05/13/2020: 2.14 ng/mL 08/13/2020: 1.79 ng/mL 11/12/2020: 2.01 ng/mL 06/10/2021: 2.02 ng/mL 12/15/2021: 2.71 ng/mL 06/23/2022: 3.65 ng/mL 02/15/2023: 3.59 ng/mL Last injection: Lupron 22.5 mg 02/15/2023. Advised to get next injection Discussed with Dr. Floyd last visit.   RTC: 2 weeks to see progression of Balanitis     08/08/2023: Mr. AGUSTIN is a 88 year male who presents today for a follow up for Balanitis.  Here to see progression, two weeks ago no progression.  Has been using Nystatin for 5 weeks.  Incontinence, secondary to Radical Proctectomy and severe phimosis makes it worse.  Today looks much better.  Will continue Nystati   If there is no improvement, recommended to get circumcision.  The procedure, alternatives, benefits and risks were explained to the pt's wife.  Answered all of wife's concerns and questions. Will continue Nystatin and follow up PRN    Ca Prostate:  perineal radical prostatectomy 1994 Adjuvant radiotherapy Artificial Sphincter 1996 On Hormones for Elevated PSA PSA: 05/15/2019: 1.34 ng/mL 08/14/2019: 1.31 ng/mL 02/12/2020: 2.12 ng/mL 05/13/2020: 2.14 ng/mL 08/13/2020: 1.79 ng/mL 11/12/2020: 2.01 ng/mL 06/10/2021: 2.02 ng/mL 12/15/2021: 2.71 ng/mL 06/23/2022: 3.65 ng/mL 02/15/2023: 3.59 ng/mL Last injection: Lupron 22.5 mg 02/15/2023. Advised to get next injection Discussed with Dr. Floyd  RTC: PRN        06/18/2024 Pt is a 89 y/o male who presents for follow up for Ca Prostate. Pt has lost a significant amount of weight.  PSHx: Perineal radical prostatectomy (07/06/2023) (1994),  Artificial Sphincter (07/06/2023) (1996), Adjuvant radiotherapy  Pt was being treated with Harmone Injection Lupron. Last injection: Lupron 22.5 mg 02/15/2023. Pt advised to discontinue Lupron.  PSA : 02/15/2023: 3.59 ng/m 05/06/2024: 7.17 ng.mL No significant change.   Was on  Hormones for Elevated PSA PSA: 05/15/2019: 1.34 ng/mL 08/14/2019: 1.31 ng/mL 02/12/2020: 2.12 ng/mL 05/13/2020: 2.14 ng/mL 08/13/2020: 1.79 ng/mL 11/12/2020: 2.01 ng/mL 06/10/2021: 2.02 ng/mL 12/15/2021: 2.71 ng/mL 06/23/2022: 3.65 ng/mL 02/15/2023: 3.59 ng/mL  05/10/2024  Last PSA 7.17 ng/mL Last injection: Lupron 22.5 mg 02/15/2023. Pt advised to discontinue   Loss of weight is unlikely of Ca Prostate origin.   RTC: PRN

## 2024-06-18 NOTE — PHYSICAL EXAM
[General Appearance - Well Developed] : well developed [General Appearance - Well Nourished] : well nourished [Normal Appearance] : normal appearance [Well Groomed] : well groomed [General Appearance - In No Acute Distress] : no acute distress [Abdomen Soft] : soft [Costovertebral Angle Tenderness] : no ~M costovertebral angle tenderness [Abdomen Tenderness] : non-tender [Urinary Bladder Findings] : the bladder was normal on palpation [Edema] : no peripheral edema [] : no respiratory distress [Respiration, Rhythm And Depth] : normal respiratory rhythm and effort [Exaggerated Use Of Accessory Muscles For Inspiration] : no accessory muscle use [Oriented To Time, Place, And Person] : oriented to person, place, and time [Affect] : the affect was normal [Mood] : the mood was normal [Not Anxious] : not anxious [No Focal Deficits] : no focal deficits [No Palpable Adenopathy] : no palpable adenopathy [FreeTextEntry1] : wheel chair bound

## 2024-08-01 ENCOUNTER — INPATIENT (INPATIENT)
Facility: HOSPITAL | Age: 89
LOS: 7 days | Discharge: NURSING, REHAB OR LONG-TERM CARE | End: 2024-08-09
Attending: INTERNAL MEDICINE | Admitting: INTERNAL MEDICINE
Payer: MEDICARE

## 2024-08-01 VITALS
RESPIRATION RATE: 29 BRPM | WEIGHT: 100.31 LBS | SYSTOLIC BLOOD PRESSURE: 118 MMHG | DIASTOLIC BLOOD PRESSURE: 82 MMHG | HEART RATE: 93 BPM

## 2024-08-01 DIAGNOSIS — Z98.89 OTHER SPECIFIED POSTPROCEDURAL STATES: Chronic | ICD-10-CM

## 2024-08-01 DIAGNOSIS — Z96.652 PRESENCE OF LEFT ARTIFICIAL KNEE JOINT: Chronic | ICD-10-CM

## 2024-08-01 DIAGNOSIS — Z96.0 PRESENCE OF UROGENITAL IMPLANTS: Chronic | ICD-10-CM

## 2024-08-01 LAB
ALBUMIN SERPL ELPH-MCNC: 2.7 G/DL — LOW (ref 3.3–5)
ALP SERPL-CCNC: 96 U/L — SIGNIFICANT CHANGE UP (ref 40–120)
ALT FLD-CCNC: 16 U/L — SIGNIFICANT CHANGE UP (ref 12–78)
ANION GAP SERPL CALC-SCNC: 6 MMOL/L — SIGNIFICANT CHANGE UP (ref 5–17)
ANISOCYTOSIS BLD QL: SLIGHT — SIGNIFICANT CHANGE UP
APPEARANCE UR: ABNORMAL
APTT BLD: 33.2 SEC — SIGNIFICANT CHANGE UP (ref 24.5–35.6)
AST SERPL-CCNC: 14 U/L — LOW (ref 15–37)
BACTERIA # UR AUTO: ABNORMAL /HPF
BASE EXCESS BLDV CALC-SCNC: -0.2 MMOL/L — SIGNIFICANT CHANGE UP (ref -2–3)
BASOPHILS # BLD AUTO: 0 K/UL — SIGNIFICANT CHANGE UP (ref 0–0.2)
BASOPHILS NFR BLD AUTO: 0 % — SIGNIFICANT CHANGE UP (ref 0–2)
BILIRUB SERPL-MCNC: 0.5 MG/DL — SIGNIFICANT CHANGE UP (ref 0.2–1.2)
BILIRUB UR-MCNC: NEGATIVE — SIGNIFICANT CHANGE UP
BLOOD GAS COMMENTS, VENOUS: SIGNIFICANT CHANGE UP
BUN SERPL-MCNC: 55 MG/DL — HIGH (ref 7–23)
CALCIUM SERPL-MCNC: 9.5 MG/DL — SIGNIFICANT CHANGE UP (ref 8.5–10.1)
CHLORIDE BLDV-SCNC: 103 MMOL/L — SIGNIFICANT CHANGE UP (ref 98–107)
CHLORIDE SERPL-SCNC: 106 MMOL/L — SIGNIFICANT CHANGE UP (ref 96–108)
CO2 BLDV-SCNC: 28 MMOL/L — HIGH (ref 22–26)
CO2 SERPL-SCNC: 26 MMOL/L — SIGNIFICANT CHANGE UP (ref 22–31)
COD CRY URNS QL: PRESENT
COLOR SPEC: YELLOW — SIGNIFICANT CHANGE UP
COMMENT - URINE: SIGNIFICANT CHANGE UP
CREAT SERPL-MCNC: 1.76 MG/DL — HIGH (ref 0.5–1.3)
DIFF PNL FLD: NEGATIVE — SIGNIFICANT CHANGE UP
EGFR: 37 ML/MIN/1.73M2 — LOW
ELLIPTOCYTES BLD QL SMEAR: SLIGHT — SIGNIFICANT CHANGE UP
EOSINOPHIL # BLD AUTO: 0 K/UL — SIGNIFICANT CHANGE UP (ref 0–0.5)
EOSINOPHIL NFR BLD AUTO: 0 % — SIGNIFICANT CHANGE UP (ref 0–6)
EPI CELLS # UR: PRESENT
FLUAV AG NPH QL: SIGNIFICANT CHANGE UP
FLUBV AG NPH QL: SIGNIFICANT CHANGE UP
GAS PNL BLDA: SIGNIFICANT CHANGE UP
GAS PNL BLDV: 136 MMOL/L — SIGNIFICANT CHANGE UP (ref 136–145)
GAS PNL BLDV: SIGNIFICANT CHANGE UP
GAS PNL BLDV: SIGNIFICANT CHANGE UP
GLUCOSE BLDV-MCNC: 176 MG/DL — HIGH (ref 65–95)
GLUCOSE SERPL-MCNC: 173 MG/DL — HIGH (ref 70–99)
GLUCOSE UR QL: 100 MG/DL
HCO3 BLDV-SCNC: 26 MMOL/L — SIGNIFICANT CHANGE UP (ref 22–28)
HCT VFR BLD CALC: 34.3 % — LOW (ref 39–50)
HCT VFR BLDA CALC: 38 % — SIGNIFICANT CHANGE UP (ref 37–47)
HGB BLD CALC-MCNC: 12.5 G/DL — LOW (ref 12.6–17.4)
HGB BLD-MCNC: 10.6 G/DL — LOW (ref 13–17)
HOROWITZ INDEX BLDV+IHG-RTO: 50 — SIGNIFICANT CHANGE UP
HYPOCHROMIA BLD QL: SLIGHT — SIGNIFICANT CHANGE UP
INR BLD: 0.98 RATIO — SIGNIFICANT CHANGE UP (ref 0.85–1.18)
KETONES UR-MCNC: NEGATIVE MG/DL — SIGNIFICANT CHANGE UP
LACTATE BLDV-MCNC: 3.8 MMOL/L — HIGH (ref 0.56–1.39)
LACTATE SERPL-SCNC: 2.4 MMOL/L — HIGH (ref 0.7–2)
LEUKOCYTE ESTERASE UR-ACNC: NEGATIVE — SIGNIFICANT CHANGE UP
LG PLATELETS BLD QL AUTO: SLIGHT — SIGNIFICANT CHANGE UP
LYMPHOCYTES # BLD AUTO: 0.19 K/UL — LOW (ref 1–3.3)
LYMPHOCYTES # BLD AUTO: 1 % — LOW (ref 13–44)
MANUAL SMEAR VERIFICATION: SIGNIFICANT CHANGE UP
MCHC RBC-ENTMCNC: 25.1 PG — LOW (ref 27–34)
MCHC RBC-ENTMCNC: 30.9 G/DL — LOW (ref 32–36)
MCV RBC AUTO: 81.3 FL — SIGNIFICANT CHANGE UP (ref 80–100)
MICROCYTES BLD QL: SLIGHT — SIGNIFICANT CHANGE UP
MONOCYTES # BLD AUTO: 0.39 K/UL — SIGNIFICANT CHANGE UP (ref 0–0.9)
MONOCYTES NFR BLD AUTO: 2 % — SIGNIFICANT CHANGE UP (ref 2–14)
NEUTROPHILS # BLD AUTO: 18.86 K/UL — HIGH (ref 1.8–7.4)
NEUTROPHILS NFR BLD AUTO: 96 % — HIGH (ref 43–77)
NEUTS BAND # BLD: 1 % — SIGNIFICANT CHANGE UP (ref 0–8)
NITRITE UR-MCNC: NEGATIVE — SIGNIFICANT CHANGE UP
NRBC # BLD: 0 /100 WBCS — SIGNIFICANT CHANGE UP (ref 0–0)
NRBC # BLD: SIGNIFICANT CHANGE UP /100 WBCS (ref 0–0)
OVALOCYTES BLD QL SMEAR: SLIGHT — SIGNIFICANT CHANGE UP
PCO2 BLDV: 50 MMHG — SIGNIFICANT CHANGE UP (ref 42–55)
PH BLDV: 7.33 — SIGNIFICANT CHANGE UP (ref 7.32–7.43)
PH UR: 5.5 — SIGNIFICANT CHANGE UP (ref 5–8)
PLAT MORPH BLD: ABNORMAL
PLATELET # BLD AUTO: 316 K/UL — SIGNIFICANT CHANGE UP (ref 150–400)
PLATELET CLUMP BLD QL SMEAR: SIGNIFICANT CHANGE UP
PO2 BLDV: <20 MMHG — LOW (ref 25–45)
POIKILOCYTOSIS BLD QL AUTO: SLIGHT — SIGNIFICANT CHANGE UP
POTASSIUM BLDV-SCNC: 5.6 MMOL/L — HIGH (ref 3.5–5.1)
POTASSIUM SERPL-MCNC: 5.2 MMOL/L — SIGNIFICANT CHANGE UP (ref 3.5–5.3)
POTASSIUM SERPL-SCNC: 5.2 MMOL/L — SIGNIFICANT CHANGE UP (ref 3.5–5.3)
PROT SERPL-MCNC: 7.5 GM/DL — SIGNIFICANT CHANGE UP (ref 6–8.3)
PROT UR-MCNC: 100 MG/DL
PROTHROM AB SERPL-ACNC: 11.8 SEC — SIGNIFICANT CHANGE UP (ref 9.5–13)
RBC # BLD: 4.22 M/UL — SIGNIFICANT CHANGE UP (ref 4.2–5.8)
RBC # FLD: 17.1 % — HIGH (ref 10.3–14.5)
RBC BLD AUTO: ABNORMAL
RBC CASTS # UR COMP ASSIST: 2 /HPF — SIGNIFICANT CHANGE UP (ref 0–4)
ROULEAUX BLD QL SMEAR: PRESENT
SAO2 % BLDV: 12.1 % — LOW (ref 94–98)
SARS-COV-2 RNA SPEC QL NAA+PROBE: SIGNIFICANT CHANGE UP
SODIUM SERPL-SCNC: 138 MMOL/L — SIGNIFICANT CHANGE UP (ref 135–145)
SP GR SPEC: >1.03 — HIGH (ref 1–1.03)
TROPONIN I, HIGH SENSITIVITY RESULT: 10.1 NG/L — SIGNIFICANT CHANGE UP
UROBILINOGEN FLD QL: 1 MG/DL — SIGNIFICANT CHANGE UP (ref 0.2–1)
WBC # BLD: 19.44 K/UL — HIGH (ref 3.8–10.5)
WBC # FLD AUTO: 19.44 K/UL — HIGH (ref 3.8–10.5)
WBC UR QL: 3 /HPF — SIGNIFICANT CHANGE UP (ref 0–5)

## 2024-08-01 PROCEDURE — 71045 X-RAY EXAM CHEST 1 VIEW: CPT | Mod: 26,77

## 2024-08-01 PROCEDURE — 31500 INSERT EMERGENCY AIRWAY: CPT

## 2024-08-01 PROCEDURE — 99285 EMERGENCY DEPT VISIT HI MDM: CPT | Mod: FS,25

## 2024-08-01 PROCEDURE — 93010 ELECTROCARDIOGRAM REPORT: CPT

## 2024-08-01 PROCEDURE — 71275 CT ANGIOGRAPHY CHEST: CPT | Mod: 26,MC

## 2024-08-01 PROCEDURE — 71045 X-RAY EXAM CHEST 1 VIEW: CPT | Mod: 26

## 2024-08-01 RX ORDER — CEFEPIME HYDROCHLORIDE 1 G/1
1000 INJECTION, POWDER, FOR SOLUTION INTRAMUSCULAR; INTRAVENOUS ONCE
Refills: 0 | Status: COMPLETED | OUTPATIENT
Start: 2024-08-01 | End: 2024-08-01

## 2024-08-01 RX ORDER — ACETAMINOPHEN 500 MG
675 TABLET ORAL ONCE
Refills: 0 | Status: DISCONTINUED | OUTPATIENT
Start: 2024-08-01 | End: 2024-08-01

## 2024-08-01 RX ORDER — KETAMINE HYDROCHLORIDE 100 MG/ML
100 INJECTION, SOLUTION INTRAMUSCULAR; INTRAVENOUS ONCE
Refills: 0 | Status: DISCONTINUED | OUTPATIENT
Start: 2024-08-01 | End: 2024-08-01

## 2024-08-01 RX ORDER — DEXTROSE MONOHYDRATE, SODIUM CHLORIDE, SODIUM LACTATE, CALCIUM CHLORIDE, MAGNESIUM CHLORIDE 1.5; 538; 448; 18.4; 5.08 G/100ML; MG/100ML; MG/100ML; MG/100ML; MG/100ML
1000 SOLUTION INTRAPERITONEAL
Refills: 0 | Status: DISCONTINUED | OUTPATIENT
Start: 2024-08-01 | End: 2024-08-02

## 2024-08-01 RX ORDER — BUPIVACAINE HCL/0.9 % NACL/PF 0.25 %
0.05 PLASTIC BAG, INJECTION (ML) EPIDURAL
Qty: 8 | Refills: 0 | Status: DISCONTINUED | OUTPATIENT
Start: 2024-08-01 | End: 2024-08-02

## 2024-08-01 RX ORDER — CHLORHEXIDINE GLUCONATE 500 MG/1
1 CLOTH TOPICAL
Refills: 0 | Status: DISCONTINUED | OUTPATIENT
Start: 2024-08-01 | End: 2024-08-09

## 2024-08-01 RX ORDER — BACTERIOSTATIC SODIUM CHLORIDE 0.9 %
1400 VIAL (ML) INJECTION ONCE
Refills: 0 | Status: COMPLETED | OUTPATIENT
Start: 2024-08-01 | End: 2024-08-01

## 2024-08-01 RX ORDER — VANCOMYCIN HYDROCHLORIDE 5 G/100ML
750 INJECTION, POWDER, LYOPHILIZED, FOR SOLUTION INTRAVENOUS ONCE
Refills: 0 | Status: COMPLETED | OUTPATIENT
Start: 2024-08-01 | End: 2024-08-01

## 2024-08-01 RX ORDER — DEXTROSE MONOHYDRATE, SODIUM CHLORIDE, SODIUM LACTATE, CALCIUM CHLORIDE, MAGNESIUM CHLORIDE 1.5; 538; 448; 18.4; 5.08 G/100ML; MG/100ML; MG/100ML; MG/100ML; MG/100ML
1000 SOLUTION INTRAPERITONEAL
Refills: 0 | Status: DISCONTINUED | OUTPATIENT
Start: 2024-08-01 | End: 2024-08-01

## 2024-08-01 RX ORDER — ROCURONIUM BROMIDE 10 MG/ML
100 INJECTION INTRAVENOUS ONCE
Refills: 0 | Status: COMPLETED | OUTPATIENT
Start: 2024-08-01 | End: 2024-08-01

## 2024-08-01 RX ORDER — CHLORHEXIDINE GLUCONATE 500 MG/1
15 CLOTH TOPICAL EVERY 12 HOURS
Refills: 0 | Status: DISCONTINUED | OUTPATIENT
Start: 2024-08-01 | End: 2024-08-02

## 2024-08-01 RX ORDER — MIDAZOLAM HYDROCHLORIDE 5 MG/ML
0.02 INJECTION, SOLUTION INTRAMUSCULAR; INTRAVENOUS
Qty: 100 | Refills: 0 | Status: DISCONTINUED | OUTPATIENT
Start: 2024-08-01 | End: 2024-08-01

## 2024-08-01 RX ORDER — HEPARIN SODIUM 1000 [USP'U]/ML
5000 INJECTION, SOLUTION INTRAVENOUS; SUBCUTANEOUS EVERY 8 HOURS
Refills: 0 | Status: DISCONTINUED | OUTPATIENT
Start: 2024-08-01 | End: 2024-08-09

## 2024-08-01 RX ORDER — CEFEPIME HYDROCHLORIDE 1 G/1
1000 INJECTION, POWDER, FOR SOLUTION INTRAMUSCULAR; INTRAVENOUS EVERY 24 HOURS
Refills: 0 | Status: DISCONTINUED | OUTPATIENT
Start: 2024-08-02 | End: 2024-08-02

## 2024-08-01 RX ORDER — ACETAMINOPHEN 500 MG
1000 TABLET ORAL ONCE
Refills: 0 | Status: COMPLETED | OUTPATIENT
Start: 2024-08-01 | End: 2024-08-01

## 2024-08-01 RX ORDER — PANTOPRAZOLE SODIUM 20 MG/1
40 TABLET, DELAYED RELEASE ORAL ONCE
Refills: 0 | Status: COMPLETED | OUTPATIENT
Start: 2024-08-01 | End: 2024-08-01

## 2024-08-01 RX ORDER — PROPOFOL 10 MG/ML
10 INJECTION, EMULSION INTRAVENOUS
Qty: 1000 | Refills: 0 | Status: DISCONTINUED | OUTPATIENT
Start: 2024-08-01 | End: 2024-08-02

## 2024-08-01 RX ADMIN — ROCURONIUM BROMIDE 100 MILLIGRAM(S): 10 INJECTION INTRAVENOUS at 15:55

## 2024-08-01 RX ADMIN — Medication 1400 MILLILITER(S): at 16:28

## 2024-08-01 RX ADMIN — HEPARIN SODIUM 5000 UNIT(S): 1000 INJECTION, SOLUTION INTRAVENOUS; SUBCUTANEOUS at 17:42

## 2024-08-01 RX ADMIN — CEFEPIME HYDROCHLORIDE 100 MILLIGRAM(S): 1 INJECTION, POWDER, FOR SOLUTION INTRAMUSCULAR; INTRAVENOUS at 16:28

## 2024-08-01 RX ADMIN — MIDAZOLAM HYDROCHLORIDE 0.91 MG/KG/HR: 5 INJECTION, SOLUTION INTRAMUSCULAR; INTRAVENOUS at 16:05

## 2024-08-01 RX ADMIN — DEXTROSE MONOHYDRATE, SODIUM CHLORIDE, SODIUM LACTATE, CALCIUM CHLORIDE, MAGNESIUM CHLORIDE 50 MILLILITER(S): 1.5; 538; 448; 18.4; 5.08 SOLUTION INTRAPERITONEAL at 21:33

## 2024-08-01 RX ADMIN — Medication 4.7 MICROGRAM(S)/KG/MIN: at 21:32

## 2024-08-01 RX ADMIN — DEXTROSE MONOHYDRATE, SODIUM CHLORIDE, SODIUM LACTATE, CALCIUM CHLORIDE, MAGNESIUM CHLORIDE 50 MILLILITER(S): 1.5; 538; 448; 18.4; 5.08 SOLUTION INTRAPERITONEAL at 18:41

## 2024-08-01 RX ADMIN — KETAMINE HYDROCHLORIDE 100 MILLIGRAM(S): 100 INJECTION, SOLUTION INTRAMUSCULAR; INTRAVENOUS at 15:54

## 2024-08-01 RX ADMIN — HEPARIN SODIUM 5000 UNIT(S): 1000 INJECTION, SOLUTION INTRAVENOUS; SUBCUTANEOUS at 21:32

## 2024-08-01 RX ADMIN — DEXTROSE MONOHYDRATE, SODIUM CHLORIDE, SODIUM LACTATE, CALCIUM CHLORIDE, MAGNESIUM CHLORIDE 50 MILLILITER(S): 1.5; 538; 448; 18.4; 5.08 SOLUTION INTRAPERITONEAL at 21:49

## 2024-08-01 RX ADMIN — VANCOMYCIN HYDROCHLORIDE 250 MILLIGRAM(S): 5 INJECTION, POWDER, LYOPHILIZED, FOR SOLUTION INTRAVENOUS at 16:41

## 2024-08-01 RX ADMIN — PROPOFOL 3.01 MICROGRAM(S)/KG/MIN: 10 INJECTION, EMULSION INTRAVENOUS at 21:32

## 2024-08-01 RX ADMIN — CHLORHEXIDINE GLUCONATE 1 APPLICATION(S): 500 CLOTH TOPICAL at 17:37

## 2024-08-01 RX ADMIN — CHLORHEXIDINE GLUCONATE 15 MILLILITER(S): 500 CLOTH TOPICAL at 17:42

## 2024-08-01 RX ADMIN — PROPOFOL 3.01 MICROGRAM(S)/KG/MIN: 10 INJECTION, EMULSION INTRAVENOUS at 17:38

## 2024-08-01 RX ADMIN — PANTOPRAZOLE SODIUM 40 MILLIGRAM(S): 20 TABLET, DELAYED RELEASE ORAL at 20:31

## 2024-08-01 RX ADMIN — Medication 400 MILLIGRAM(S): at 16:45

## 2024-08-01 RX ADMIN — Medication 4.7 MICROGRAM(S)/KG/MIN: at 18:41

## 2024-08-01 NOTE — H&P ADULT - ASSESSMENT
89 year old male with PHX: Alzheimrs, Dementia ( A&Ox1 at baseline) Prostate CA S/P prostatectomy, Dysphagia brought in by EMS from [facility] for SOB and difficulty breathing. Wife at bedside stated has looked progressively worse x 1 week, today was first day looked distressful. Labs significant for WBC 19, BUN 55, Cr 1.76. CT  chest with B/L LL PNA/ atlectasis and negative for PE. Admitted to ICU for Acute hypoxemic respiratory failure, PNA, Sepsis.     PLAN:    #NEURO:  - Sedated on propofol post intubation for vent synchrony  - Baseline MS with dementia A&Ox1 (slurred speech at baseline as well)   - Monitor MS    #PULM:  - Acute hypoxemic respiratory failure requiring intubation and mechanical ventilation  likely 2/2 B/L LL PNA  - SBT as tolerates    #CV:  - Shock state likely sedation related vasoplegia- only hypotensive post intubation/ and sedation   - Levophed to maintain MAP> 65  - Monitor VS and maintain MAP> 65    #GI:  - NPO for now, D5/LR maintenance while NPO   - Protonix GI ppx  - Aspiration precautions  - HX dysphagia will need official speech and swallow eval once extubated     #ID:  - RLL and LL PNA on CT  chest. will continue with empiric Cefepime for HAP and vancomycin   - F/U MRSA PCR, UA, Blood cultures     #RENAL:  - ARLENE likely 2/2 pre renal azotemia from sepsis  - [place], Monitor BUN/ Cr, UO, electrolytes and replenish PRN    #HEME:  - Heparin SQ dvt ppx    #GEN:  - Full code, condition guarded  - Plan discussed with ICU attending MD Cotter, and Wife Maggie at bedside.,

## 2024-08-01 NOTE — ED ADULT NURSE NOTE - NSICDXPASTMEDICALHX_GEN_ALL_CORE_FT
PAST MEDICAL HISTORY:  Fall 3/15/17, slipped on ice , injured left side of the body, denies broken bones, still with minor soreness.    [place] (hard of hearing) uses hearing aids PRN    Hypertension     Prostate ca 1992, Lupron Inj Q 3 months    Vertigo 2005 and 12/2016, uses meclizine PRN, last episode 12/2016

## 2024-08-01 NOTE — H&P ADULT - HISTORY OF PRESENT ILLNESS
89 year old male with PHX: Alzheimrs, Dementia ( A&Ox1 at baseline) Prostate CA S/P prostatectomy, Dysphagia brought in by EMS from [facility] for SOB and difficulty breathing. Wife at bedside stated has looked progressively worse x 1 week, today was first day looked distressful. Wife does endorse difficulty eating with small aspiration events. Denies any fevers, cough not associated with eating, CP, ABD pain, N/V/D, dysuria.

## 2024-08-01 NOTE — ED PROCEDURE NOTE - ATTENDING APP SHARED VISIT CONTRIBUTION OF CARE
I have personally performed a face to face medical and diagnostic evaluation of the patient. I have discussed with and reviewed the MATHIEU's note and agree with the History, ROS, Physical Exam and MDM unless otherwise indicated. A brief summary of my personal evaluation and impression can be found below. I actively participated in the comanagement of this patient with the MATHIEU. I have personally reviewed all orders, study/imaging results, medication orders. I discussed indications for consultant evaluation and consultant recommendations with the MATHIEU when applicable, and have discussed the disposition plan with the MATHIEU.     Patient intubated by SHAMIR Mccormack for hypoxemic respiratory failure.

## 2024-08-01 NOTE — ED ADULT NURSE NOTE - NSICDXPASTSURGICALHX_GEN_ALL_CORE_FT
Please call pt  I have spoken with one of endocrinologist who recommends adding Jardience to your diabetic treatment plan.  Cont your other meds.     PAST SURGICAL HISTORY:  History of knee replacement, total, left 1999, s/p revision 2001    History of prostate surgery 1992    Status post implantation of artificial urinary sphincter 1996

## 2024-08-01 NOTE — H&P ADULT - NSHPPHYSICALEXAM_GEN_ALL_CORE
PHYSICAL EXAM:    GENERAL: elderly cachectic appearing  HEAD:  Atraumatic, Normocephalic  EYES: PERRLA, conjunctiva and sclera clear  ENMT: MMM, orally intubated  NECK: Supple, No JVD  NERVOUS SYSTEM: sedated, responds to physical stimuli   CHEST/LUNG: B/L rhonchi, no wheeze  HEART: Regular rate and rhythm; No murmurs, rubs, or gallops  ABDOMEN: Soft, Nontender, Nondistended; Bowel sounds present  EXTREMITIES:  2+ Peripheral Pulses, No clubbing, cyanosis, or edema  SKIN: No rashes or lesions

## 2024-08-01 NOTE — ED ADULT NURSE NOTE - CHIEF COMPLAINT QUOTE
BIBA from [facility] for respiratory distress that started today, as per emt, family member told them that he hasn't been feeling right the past few days, states patient was sating 84% on 15L NRB

## 2024-08-01 NOTE — ED PROVIDER NOTE - NSICDXPASTMEDICALHX_GEN_ALL_CORE_FT
PAST MEDICAL HISTORY:  Fall 3/15/17, slipped on ice , injured left side of the body, denies broken bones, still with minor soreness.    [place]hard of hearing[place] uses hearing aids PRN    Hypertension     Prostate ca 1992, Lupron Inj Q 3 months    Vertigo 2005 and 12/2016, uses meclizine PRN, last episode 12/2016

## 2024-08-01 NOTE — ED PROVIDER NOTE - CARE PLAN
1 Principal Discharge DX:	Acute respiratory distress  Secondary Diagnosis:	Pneumonia, aspiration  Secondary Diagnosis:	Sepsis

## 2024-08-01 NOTE — ED PROVIDER NOTE - ATTENDING APP SHARED VISIT CONTRIBUTION OF CARE
I have personally performed a face to face medical and diagnostic evaluation of the patient. I have discussed with and reviewed the MATHIEU's note and agree with the History, ROS, Physical Exam and MDM unless otherwise indicated. A brief summary of my personal evaluation and impression can be found below. I actively participated in the comanagement of this patient with the MATHIEU. I have personally reviewed all orders, study/imaging results, medication orders. I discussed indications for consultant evaluation and consultant recommendations with the MATHIEU when applicable, and have discussed the disposition plan with the MATHIEU.     See MDM for attending note.

## 2024-08-01 NOTE — ED ADULT NURSE REASSESSMENT NOTE - NS ED NURSE REASSESS COMMENT FT1
Patient transported to CT scan with transporter and RT. While in CT was advised by SHAMIR Simons patient was admitted and writer should call for report. Writer called CCU and gave report to Bacilio HWANG. JAIDEN Ribera advised of need to insert aleman catheter. RN verbalized understanding. Patient transported off the ED floor at 17:00 with LUCAS Ritter and RT.

## 2024-08-01 NOTE — PROGRESS NOTE ADULT - SUBJECTIVE AND OBJECTIVE BOX
89M with advanced dementia from SNF FTT BMI 16 Prostate CA S/P prostatectomy,    Admit today from SNF with type 1 resp failure due to what appears to be aspiration PNA.  He has ARLENE from baseline      89M with advanced dementia from SNF FTT BMI 16 Prostate CA S/P prostatectomy,    CCT32     Admit today from SNF with type 1 resp failure due to what appears to be aspiration PNA.  He has ARLENE from baseline, lactic acidosis      He has received 30cc/kg of crystolloid and his BP dropped into the 70's    Started on nor-epi for MAP 65    Sedate with propofol     Vanco/cefepime  follow cultures     Was going to switch to pip/tazo for better anaerobic coverage, but there is a PCN allergy (unknown nature) .  If not improving consider adding clindamycin      Fluid hydration for ARLENE.  Change  D5LR to LR  glucose > 150     DVT P       For now full code, but family are discussing next steps regarding limitations on care.

## 2024-08-01 NOTE — ED ADULT NURSE NOTE - OBJECTIVE STATEMENT
89 yo male, alert, unable to provide information due to respiratory distress, tachypneic, febrile and hypoxic on arrival. BIBA from [facility] for respiratory distress that started today, as per emt, family member told them that he hasn't been feeling right the past few days. Patient unable to tolerate bipap, intubated.

## 2024-08-01 NOTE — PHARMACOTHERAPY INTERVENTION NOTE - COMMENTS
Pharmacy at bedside with ED team for rapid sequence intubation. Patient received ketamine 100mg IVP and rocuronium 100mg IVP for intubation. Patient currently on midazolam infusion for sedation as patient's BP after intubation was soft (90s/70s mmHg). If blood pressure more stable, team can consider switching to propofol infusion for sedation.
Recommended initiating vancomycin 750mg and cefepime 1g for empiric therapy to treat sepsis 2/2 possible PNA.

## 2024-08-01 NOTE — ED ADULT TRIAGE NOTE - CHIEF COMPLAINT QUOTE
BIBA from [facility] for respiratory distress that started today, as per emt, family member told them that he hasn't been feeling right the past few days, states patient was sating 84% on 15L NRB

## 2024-08-01 NOTE — ED PROVIDER NOTE - CLINICAL SUMMARY MEDICAL DECISION MAKING FREE TEXT BOX
89-year-old male with a past medical history of hypertension, prostate cancer, Alzheimer's disease presenting with shortness of breath. Patient resides in nursing facility. Been having worsening shortness of breath over the last few days. Nursing facility called ambulance for respiratory distress. Patient is A&O x 0. Patient is awake. Physical exam reveals male with increased respiratory rate, crackles at right lung base, soft abdomen, tactilely warm. Concern for sepsis pneumonia. Patient has respiratory rate of 40s with oxygen saturation 80s. Point-of-care ultrasound showing a lungs bilaterally, flat IVC collapsible with respiration, patient appears dehydrated. Sepsis workup, BiPAP for increased work of breathing, Antibiotics, IV fluids.

## 2024-08-01 NOTE — PATIENT PROFILE ADULT - FALL HARM RISK - HARM RISK INTERVENTIONS

## 2024-08-01 NOTE — H&P ADULT - NSHPLABSRESULTS_GEN_ALL_CORE
LABS:                        10.6   19.44 )-----------( 316      ( 01 Aug 2024 15:40 )             34.3     08-01    138  |  106  |  55<H>  ----------------------------<  173<H>  5.2   |  26  |  1.76<H>    Ca    9.5      01 Aug 2024 15:40    TPro  7.5  /  Alb  2.7<L>  /  TBili  0.5  /  DBili  x   /  AST  14<L>  /  ALT  16  /  AlkPhos  96  08-01    PT/INR - ( 01 Aug 2024 15:40 )   PT: 11.8 sec;   INR: 0.98 ratio       PTT - ( 01 Aug 2024 15:40 )  PTT:33.2 sec    ABG - ( 01 Aug 2024 16:35 )  pH, Arterial: 7.33  pH, Blood: x     /  pCO2: 45    /  pO2: 360   / HCO3: 24    / Base Excess: -2.2  /  SaO2: 100.0     Urinalysis Basic - ( 01 Aug 2024 15:40 )    Color: x / Appearance: x / SG: x / pH: x  Gluc: 173 mg/dL / Ketone: x  / Bili: x / Urobili: x   Blood: x / Protein: x / Nitrite: x   Leuk Esterase: x / RBC: x / WBC x   Sq Epi: x / Non Sq Epi: x / Bacteria: x      CULTURES: Pending     RADIOLOGY:    < from: CT Angio Chest PE Protocol w/ IV Cont (08.01.24 @ 16:54) >      IMPRESSION: No pulmonary arterial emboli.    Bilateral mid to lower lung nodular and confluent consolidations, most   notable within the lower lobes with associated volume loss suggestive of   combination of atelectasis and pneumonia with endobronchial spread.    Opacification of large portions of the lower lung central and distal   airways as described above related to an internal secretions and/or   aspiration.    Right upper lobe 1.3 cm indeterminate pulmonary nodule.    1-3 month follow-up noncontrast chest CT is recommended for further   evaluation of the above findings.    Trace bilateral pleural effusions.    Bilateral pleural calcifications which can be is seen in the setting of   prior asbestos exposure. Right lower hemithorax pleural thickening.    --- End of Report ---    < end of copied text >

## 2024-08-01 NOTE — ED PROVIDER NOTE - CONSTITUTIONAL MENTATION
Pt presents to the emergency department for lower back pain that radiates to the right leg.  Pt denies any recent injury, states the pain has been chronic for \"about one month.\"    awake/alert

## 2024-08-01 NOTE — PATIENT PROFILE ADULT - FOOD INSECURITY
Patient denies tobacco use. He drinks socially a couple times each week. He endorses frequent marijuana usage since 17 yo. Currently smokes marijuana approximately every 2 hours. no

## 2024-08-01 NOTE — ED PROVIDER NOTE - PROGRESS NOTE DETAILS
SHAMIR Simons: Wife Maggie and the waiting room, confirmed CODE STATUS prior to intubation.  Patient is a full code.  Wife states that patient has not been himself for the past week, having more difficulty eating and discussions for feeding tube are in place.  States the last 2 days she was concerned that he was not feeling well, vital signs were stable at the nursing home.  States yesterday she felt like his lips started to look discolored and today he seemed much worse so called 911.  Wife aware that patient is intubated on a ventilator, sedated with medications and is receiving IV fluids, IV antibiotics and IV Tylenol for fever and infection.  High suspicion for sepsis secondary to pneumonia, plan for ICU.  ICU NP Christian seen in the ER, will admit to ICU.  ICU team to follow-up CTA of the chest results and remaining results.

## 2024-08-01 NOTE — ED ADULT NURSE NOTE - NSFALLHARMRISKINTERV_ED_ALL_ED

## 2024-08-02 LAB
ALBUMIN SERPL ELPH-MCNC: 2 G/DL — LOW (ref 3.3–5)
ALP SERPL-CCNC: 77 U/L — SIGNIFICANT CHANGE UP (ref 40–120)
ALT FLD-CCNC: 12 U/L — SIGNIFICANT CHANGE UP (ref 12–78)
ANION GAP SERPL CALC-SCNC: 4 MMOL/L — LOW (ref 5–17)
AST SERPL-CCNC: 9 U/L — LOW (ref 15–37)
BASOPHILS # BLD AUTO: 0.04 K/UL — SIGNIFICANT CHANGE UP (ref 0–0.2)
BASOPHILS NFR BLD AUTO: 0.2 % — SIGNIFICANT CHANGE UP (ref 0–2)
BILIRUB SERPL-MCNC: 0.4 MG/DL — SIGNIFICANT CHANGE UP (ref 0.2–1.2)
BUN SERPL-MCNC: 51 MG/DL — HIGH (ref 7–23)
CALCIUM SERPL-MCNC: 8.8 MG/DL — SIGNIFICANT CHANGE UP (ref 8.5–10.1)
CHLORIDE SERPL-SCNC: 109 MMOL/L — HIGH (ref 96–108)
CO2 SERPL-SCNC: 24 MMOL/L — SIGNIFICANT CHANGE UP (ref 22–31)
CREAT SERPL-MCNC: 1.27 MG/DL — SIGNIFICANT CHANGE UP (ref 0.5–1.3)
EGFR: 55 ML/MIN/1.73M2 — LOW
EOSINOPHIL # BLD AUTO: 0.03 K/UL — SIGNIFICANT CHANGE UP (ref 0–0.5)
EOSINOPHIL NFR BLD AUTO: 0.2 % — SIGNIFICANT CHANGE UP (ref 0–6)
GLUCOSE SERPL-MCNC: 124 MG/DL — HIGH (ref 70–99)
GRAM STN FLD: ABNORMAL
HCT VFR BLD CALC: 28.6 % — LOW (ref 39–50)
HGB BLD-MCNC: 9 G/DL — LOW (ref 13–17)
IMM GRANULOCYTES NFR BLD AUTO: 0.4 % — SIGNIFICANT CHANGE UP (ref 0–0.9)
LYMPHOCYTES # BLD AUTO: 13.8 % — SIGNIFICANT CHANGE UP (ref 13–44)
LYMPHOCYTES # BLD AUTO: 2.36 K/UL — SIGNIFICANT CHANGE UP (ref 1–3.3)
MAGNESIUM SERPL-MCNC: 2.2 MG/DL — SIGNIFICANT CHANGE UP (ref 1.6–2.6)
MCHC RBC-ENTMCNC: 25.1 PG — LOW (ref 27–34)
MCHC RBC-ENTMCNC: 31.5 G/DL — LOW (ref 32–36)
MCV RBC AUTO: 79.9 FL — LOW (ref 80–100)
MONOCYTES # BLD AUTO: 1.11 K/UL — HIGH (ref 0–0.9)
MONOCYTES NFR BLD AUTO: 6.5 % — SIGNIFICANT CHANGE UP (ref 2–14)
MRSA PCR RESULT.: SIGNIFICANT CHANGE UP
NEUTROPHILS # BLD AUTO: 13.55 K/UL — HIGH (ref 1.8–7.4)
NEUTROPHILS NFR BLD AUTO: 78.9 % — HIGH (ref 43–77)
NRBC # BLD: 0 /100 WBCS — SIGNIFICANT CHANGE UP (ref 0–0)
PHOSPHATE SERPL-MCNC: 3.1 MG/DL — SIGNIFICANT CHANGE UP (ref 2.5–4.5)
PLATELET # BLD AUTO: 268 K/UL — SIGNIFICANT CHANGE UP (ref 150–400)
POTASSIUM SERPL-MCNC: 4.1 MMOL/L — SIGNIFICANT CHANGE UP (ref 3.5–5.3)
POTASSIUM SERPL-SCNC: 4.1 MMOL/L — SIGNIFICANT CHANGE UP (ref 3.5–5.3)
PROT SERPL-MCNC: 5.9 GM/DL — LOW (ref 6–8.3)
RBC # BLD: 3.58 M/UL — LOW (ref 4.2–5.8)
RBC # FLD: 16.8 % — HIGH (ref 10.3–14.5)
S AUREUS DNA NOSE QL NAA+PROBE: SIGNIFICANT CHANGE UP
SODIUM SERPL-SCNC: 137 MMOL/L — SIGNIFICANT CHANGE UP (ref 135–145)
SPECIMEN SOURCE: SIGNIFICANT CHANGE UP
VANCOMYCIN FLD-MCNC: <0.8 UG/ML — SIGNIFICANT CHANGE UP
WBC # BLD: 17.16 K/UL — HIGH (ref 3.8–10.5)
WBC # FLD AUTO: 17.16 K/UL — HIGH (ref 3.8–10.5)

## 2024-08-02 PROCEDURE — 99291 CRITICAL CARE FIRST HOUR: CPT

## 2024-08-02 RX ORDER — CEFEPIME HYDROCHLORIDE 1 G/1
1000 INJECTION, POWDER, FOR SOLUTION INTRAMUSCULAR; INTRAVENOUS EVERY 12 HOURS
Refills: 0 | Status: DISCONTINUED | OUTPATIENT
Start: 2024-08-02 | End: 2024-08-09

## 2024-08-02 RX ORDER — VANCOMYCIN HYDROCHLORIDE 5 G/100ML
1000 INJECTION, POWDER, LYOPHILIZED, FOR SOLUTION INTRAVENOUS ONCE
Refills: 0 | Status: COMPLETED | OUTPATIENT
Start: 2024-08-02 | End: 2024-08-02

## 2024-08-02 RX ADMIN — PROPOFOL 3.01 MICROGRAM(S)/KG/MIN: 10 INJECTION, EMULSION INTRAVENOUS at 05:22

## 2024-08-02 RX ADMIN — CHLORHEXIDINE GLUCONATE 1 APPLICATION(S): 500 CLOTH TOPICAL at 03:23

## 2024-08-02 RX ADMIN — HEPARIN SODIUM 5000 UNIT(S): 1000 INJECTION, SOLUTION INTRAVENOUS; SUBCUTANEOUS at 21:27

## 2024-08-02 RX ADMIN — VANCOMYCIN HYDROCHLORIDE 250 MILLIGRAM(S): 5 INJECTION, POWDER, LYOPHILIZED, FOR SOLUTION INTRAVENOUS at 09:00

## 2024-08-02 RX ADMIN — CHLORHEXIDINE GLUCONATE 15 MILLILITER(S): 500 CLOTH TOPICAL at 05:23

## 2024-08-02 RX ADMIN — HEPARIN SODIUM 5000 UNIT(S): 1000 INJECTION, SOLUTION INTRAVENOUS; SUBCUTANEOUS at 13:23

## 2024-08-02 RX ADMIN — CEFEPIME HYDROCHLORIDE 100 MILLIGRAM(S): 1 INJECTION, POWDER, FOR SOLUTION INTRAMUSCULAR; INTRAVENOUS at 18:11

## 2024-08-02 RX ADMIN — CEFEPIME HYDROCHLORIDE 100 MILLIGRAM(S): 1 INJECTION, POWDER, FOR SOLUTION INTRAMUSCULAR; INTRAVENOUS at 10:10

## 2024-08-02 RX ADMIN — HEPARIN SODIUM 5000 UNIT(S): 1000 INJECTION, SOLUTION INTRAVENOUS; SUBCUTANEOUS at 05:23

## 2024-08-02 NOTE — DIETITIAN INITIAL EVALUATION ADULT - NS FNS WEIGHT CHANGE REASON
Per previous adm RD note 11/23/22, wt. of 53.9 kg/118.8 LBS, Height 5'7" noted.  As per current adm wt. 08/01, 46.4 kg, wt. loss of 7.5 kg/~17 LBS noted in <2 years.   Current Height noted to be 5'7"

## 2024-08-02 NOTE — DIETITIAN INITIAL EVALUATION ADULT - PERTINENT MEDS FT
MEDICATIONS  (STANDING):  cefepime   IVPB 1000 milliGRAM(s) IV Intermittent every 12 hours  chlorhexidine 2% Cloths 1 Application(s) Topical <User Schedule>  heparin   Injectable 5000 Unit(s) SubCutaneous every 8 hours    MEDICATIONS  (PRN):

## 2024-08-02 NOTE — DIETITIAN INITIAL EVALUATION ADULT - PERTINENT LABORATORY DATA
08-02    137  |  109<H>  |  51<H>  ----------------------------<  124<H>  4.1   |  24  |  1.27    Ca    8.8      02 Aug 2024 02:35  Phos  3.1     08-02  Mg     2.2     08-02    TPro  5.9<L>  /  Alb  2.0<L>  /  TBili  0.4  /  DBili  x   /  AST  9<L>  /  ALT  12  /  AlkPhos  77  08-02

## 2024-08-02 NOTE — DIETITIAN INITIAL EVALUATION ADULT - ETIOLOGY
inadequate protein-energy intake in setting of history of prostate cancer, alzheimer's dementia, emphysema, dysphagia, debility, pressure ulcers

## 2024-08-02 NOTE — DIETITIAN INITIAL EVALUATION ADULT - NSFNSPHYEXAMSKINFT_GEN_A_CORE
Pressure ulcer x 6(noted on 08/01)  1. sacrum; stage I  2. right elbow; stage I  3. 08/01, left elblow; stage I  4. right scapula; stage I  5. left scapula; stage I  6. spinal process; stage I

## 2024-08-02 NOTE — DIETITIAN INITIAL EVALUATION ADULT - NS FNS DIET ORDER
Diet, NPO:   Except Medications     Special Instructions for Nursing:  Except Medications (08-01-24 @ 17:16)

## 2024-08-02 NOTE — DIETITIAN INITIAL EVALUATION ADULT - NSICDXPASTMEDICALHX_GEN_ALL_CORE_FT
PAST MEDICAL HISTORY:  Fall 3/15/17, slipped on ice , injured left side of the body, denies broken bones, still with minor soreness.    [place] (hard of hearing) uses hearing aids PRN    Hypertension     Prostate ca 1992, Lupron Inj Q 3 months    Vertigo 2005 and 12/2016, uses meclizine PRN, last episode 12/2016

## 2024-08-02 NOTE — PROGRESS NOTE ADULT - ASSESSMENT
88 y/o M w/severe alzheimer's dementia, emphysema, and hx of prostate CA admitted for acute hypoxemic respiratory failure requiring intubation and hypotension likely secondary to severe sepsis with septic shock in setting of presumed aspiration PNA. ARLENE likely ATN.     - Sedation as needed goal RASS -1 to 0  - Titrate pressors as needed goal MAP >= 65  - Broad spectrum abx, follow up cultures  - Trend Cr, avoid nephrotoxins  - DVT prophylaxis  - Full code for now, follow up GOC discussions with family    I have personally provided 35 minutes of attending critical care time excluding procedures. 88 y/o M w/severe alzheimer's dementia, emphysema, and hx of prostate CA admitted for acute hypoxemic respiratory failure requiring intubation and hypotension likely secondary to severe sepsis with septic shock in setting of presumed aspiration PNA. ARLENE likely ATN.     - Hold sedation  - SAT/SBT likely extubation  - Broad spectrum abx, follow up cultures  - Trend Cr, avoid nephrotoxins  - DVT prophylaxis  - Full code for now, follow up GOC discussions with family    I have personally provided 35 minutes of attending critical care time excluding procedures. 88 y/o M w/severe alzheimer's dementia, emphysema, and hx of prostate CA admitted for acute hypoxemic respiratory failure requiring intubation and hypotension likely secondary to severe sepsis with septic shock briefly requiring pressors in setting of presumed aspiration PNA. ARLENE likely ATN. Now awake, and off pressors.    - Hold sedation  - SAT/SBT likely extubation  - Broad spectrum abx, follow up cultures  - Trend Cr, avoid nephrotoxins  - DVT prophylaxis  - Full code for now, follow up GOC discussions with family    I have personally provided 35 minutes of attending critical care time excluding procedures.

## 2024-08-02 NOTE — DIETITIAN INITIAL EVALUATION ADULT - ORAL INTAKE PTA/DIET HISTORY
Pt noted c confusion.  Pt was transferred from Nursing Home, diet PTA: puree, honey thickened liquids, 2Cal HN thickened to honey consistency as per transfer records.

## 2024-08-02 NOTE — DIETITIAN INITIAL EVALUATION ADULT - OTHER INFO
Pt s/p extubation today.  PMH include alzheimer's dementia, emphysema. Pt s/p extubation today, adm c ARLENE.  PMH include alzheimer's dementia, emphysema.

## 2024-08-02 NOTE — AIRWAY REMOVAL NOTE  ADULT & PEDS - ARTIFICAL AIRWAY REMOVAL COMMENTS
Pt was extubated to room air. No stridor or respiratory distress noted. Will continue to monitor pt status.

## 2024-08-02 NOTE — DIETITIAN NUTRITION RISK NOTIFICATION - TREATMENT: THE FOLLOWING DIET HAS BEEN RECOMMENDED
Diet, NPO:   Except Medications     Special Instructions for Nursing:  Except Medications (08-01-24 @ 17:16) [Active]       Diet, NPO:   Except Medications     Special Instructions for Nursing:  Except Medications (08-01-24 @ 17:16) [Active]  Recommendations based on pending swallow evaluation       Patient/Caregiver provided printed discharge information.

## 2024-08-02 NOTE — PROGRESS NOTE ADULT - SUBJECTIVE AND OBJECTIVE BOX
HPI:  89 year old male with PHX: Alzheimrs, Dementia ( A&Ox1 at baseline) Prostate CA S/P prostatectomy, Dysphagia brought in by EMS from [facility] for SOB and difficulty breathing. Wife at bedside stated has looked progressively worse x 1 week, today was first day looked distressful. Wife does endorse difficulty eating with small aspiration events. Denies any fevers, cough not associated with eating, CP, ABD pain, N/V/D, dysuria.  (01 Aug 2024 18:31)      24 hr events: Intubated for acute respiratory failure. Started on pressors.     ## ROS:  [x ] unable to obtain      ## Vitals  ICU Vital Signs Last 24 Hrs  T(C): 36.2 (02 Aug 2024 07:15), Max: 39.4 (01 Aug 2024 15:35)  T(F): 97.2 (02 Aug 2024 07:15), Max: 102.9 (01 Aug 2024 15:35)  HR: 56 (02 Aug 2024 07:15) (56 - 102)  BP: 140/74 (02 Aug 2024 07:15) (65/46 - 188/84)  BP(mean): 93 (02 Aug 2024 07:15) (54 - 107)  ABP: --  ABP(mean): --  RR: 20 (02 Aug 2024 07:15) (16 - 41)  SpO2: 100% (02 Aug 2024 07:15) (86% - 100%)    O2 Parameters below as of 01 Aug 2024 17:00  Patient On (Oxygen Delivery Method): ventilator            ## Physical Exam:  Gen: Cachectic male lying in bed, intubated, sedated, NAD  HEENT: NC/AT sclerae anicteric. ET tube in place  Resp: Mechanical breath sounds b/l  CV: S1, S2  Abd: Soft, + BS  Ext: WWP  Neuro: Sedated, unarousable    ## Vent Data  Mode: CPAP with PS  FiO2: 30  PEEP: 5  PS: 5  ITime: 1  MAP: 7  PIP: 11      ## Labs:  Chem:  08-02    137  |  109<H>  |  51<H>  ----------------------------<  124<H>  4.1   |  24  |  1.27    Ca    8.8      02 Aug 2024 02:35  Phos  3.1     08-02  Mg     2.2     08-02    TPro  5.9<L>  /  Alb  2.0<L>  /  TBili  0.4  /  DBili  x   /  AST  9<L>  /  ALT  12  /  AlkPhos  77  08-02    LIVER FUNCTIONS - ( 02 Aug 2024 02:35 )  Alb: 2.0 g/dL / Pro: 5.9 gm/dL / ALK PHOS: 77 U/L / ALT: 12 U/L / AST: 9 U/L / GGT: x           CBC:                        9.0    17.16 )-----------( 268      ( 02 Aug 2024 02:35 )             28.6     Coags:  PT/INR - ( 01 Aug 2024 15:40 )   PT: 11.8 sec;   INR: 0.98 ratio         PTT - ( 01 Aug 2024 15:40 )  PTT:33.2 sec    Urinalysis Basic - ( 02 Aug 2024 02:35 )    Color: x / Appearance: x / SG: x / pH: x  Gluc: 124 mg/dL / Ketone: x  / Bili: x / Urobili: x   Blood: x / Protein: x / Nitrite: x   Leuk Esterase: x / RBC: x / WBC x   Sq Epi: x / Non Sq Epi: x / Bacteria: x        ## Cardiac        ## Blood Gas  ABG - ( 01 Aug 2024 16:35 )  pH, Arterial: 7.33  pH, Blood: x     /  pCO2: 45    /  pO2: 360   / HCO3: 24    / Base Excess: -2.2  /  SaO2: 100.0               #I/Os  I&O's Detail    01 Aug 2024 07:01  -  02 Aug 2024 07:00  --------------------------------------------------------  IN:    dextrose 5% + lactated ringers: 150 mL    Lactated Ringers: 500 mL    Norepinephrine: 31.3 mL    Propofol: 37.3 mL  Total IN: 718.6 mL    OUT:    Voided (mL): 540 mL  Total OUT: 540 mL    Total NET: 178.6 mL      02 Aug 2024 07:01  -  02 Aug 2024 08:19  --------------------------------------------------------  IN:    Lactated Ringers: 50 mL  Total IN: 50 mL    OUT:    Norepinephrine: 0 mL    Propofol: 0 mL    Voided (mL): 40 mL  Total OUT: 40 mL    Total NET: 10 mL          ## Imaging:    ## Medications:  MEDICATIONS  (STANDING):  cefepime   IVPB 1000 milliGRAM(s) IV Intermittent every 24 hours  chlorhexidine 0.12% Liquid 15 milliLiter(s) Oral Mucosa every 12 hours  chlorhexidine 2% Cloths 1 Application(s) Topical <User Schedule>  heparin   Injectable 5000 Unit(s) SubCutaneous every 8 hours  lactated ringers. 1000 milliLiter(s) (50 mL/Hr) IV Continuous <Continuous>  norepinephrine Infusion 0.05 MICROgram(s)/kG/Min (4.7 mL/Hr) IV Continuous <Continuous>  propofol Infusion 10 MICROgram(s)/kG/Min (3.01 mL/Hr) IV Continuous <Continuous>  vancomycin  IVPB 1000 milliGRAM(s) IV Intermittent once    MEDICATIONS  (PRN):       HPI:  89 year old male with PHX: Alzheimrs, Dementia ( A&Ox1 at baseline) Prostate CA S/P prostatectomy, Dysphagia brought in by EMS from [facility] for SOB and difficulty breathing. Wife at bedside stated has looked progressively worse x 1 week, today was first day looked distressful. Wife does endorse difficulty eating with small aspiration events. Denies any fevers, cough not associated with eating, CP, ABD pain, N/V/D, dysuria.  (01 Aug 2024 18:31)      24 hr events: Intubated for acute respiratory failure.    ## ROS:  [x ] unable to obtain      ## Vitals  ICU Vital Signs Last 24 Hrs  T(C): 36.2 (02 Aug 2024 07:15), Max: 39.4 (01 Aug 2024 15:35)  T(F): 97.2 (02 Aug 2024 07:15), Max: 102.9 (01 Aug 2024 15:35)  HR: 56 (02 Aug 2024 07:15) (56 - 102)  BP: 140/74 (02 Aug 2024 07:15) (65/46 - 188/84)  BP(mean): 93 (02 Aug 2024 07:15) (54 - 107)  ABP: --  ABP(mean): --  RR: 20 (02 Aug 2024 07:15) (16 - 41)  SpO2: 100% (02 Aug 2024 07:15) (86% - 100%)    O2 Parameters below as of 01 Aug 2024 17:00  Patient On (Oxygen Delivery Method): ventilator            ## Physical Exam:  Gen: Cachectic male lying in bed, intubated, awake, NAD  HEENT: NC/AT sclerae anicteric. ET tube in place  Resp: Mechanical breath sounds b/l  CV: S1, S2  Abd: Soft, + BS  Ext: WWP  Neuro: Awake, does not follow commands    ## Vent Data  Mode: CPAP with PS  FiO2: 30  PEEP: 5  PS: 5  ITime: 1  MAP: 7  PIP: 11      ## Labs:  Chem:  08-02    137  |  109<H>  |  51<H>  ----------------------------<  124<H>  4.1   |  24  |  1.27    Ca    8.8      02 Aug 2024 02:35  Phos  3.1     08-02  Mg     2.2     08-02    TPro  5.9<L>  /  Alb  2.0<L>  /  TBili  0.4  /  DBili  x   /  AST  9<L>  /  ALT  12  /  AlkPhos  77  08-02    LIVER FUNCTIONS - ( 02 Aug 2024 02:35 )  Alb: 2.0 g/dL / Pro: 5.9 gm/dL / ALK PHOS: 77 U/L / ALT: 12 U/L / AST: 9 U/L / GGT: x           CBC:                        9.0    17.16 )-----------( 268      ( 02 Aug 2024 02:35 )             28.6     Coags:  PT/INR - ( 01 Aug 2024 15:40 )   PT: 11.8 sec;   INR: 0.98 ratio         PTT - ( 01 Aug 2024 15:40 )  PTT:33.2 sec    Urinalysis Basic - ( 02 Aug 2024 02:35 )    Color: x / Appearance: x / SG: x / pH: x  Gluc: 124 mg/dL / Ketone: x  / Bili: x / Urobili: x   Blood: x / Protein: x / Nitrite: x   Leuk Esterase: x / RBC: x / WBC x   Sq Epi: x / Non Sq Epi: x / Bacteria: x        ## Cardiac        ## Blood Gas  ABG - ( 01 Aug 2024 16:35 )  pH, Arterial: 7.33  pH, Blood: x     /  pCO2: 45    /  pO2: 360   / HCO3: 24    / Base Excess: -2.2  /  SaO2: 100.0               #I/Os  I&O's Detail    01 Aug 2024 07:01  -  02 Aug 2024 07:00  --------------------------------------------------------  IN:    dextrose 5% + lactated ringers: 150 mL    Lactated Ringers: 500 mL    Norepinephrine: 31.3 mL    Propofol: 37.3 mL  Total IN: 718.6 mL    OUT:    Voided (mL): 540 mL  Total OUT: 540 mL    Total NET: 178.6 mL      02 Aug 2024 07:01  -  02 Aug 2024 08:19  --------------------------------------------------------  IN:    Lactated Ringers: 50 mL  Total IN: 50 mL    OUT:    Norepinephrine: 0 mL    Propofol: 0 mL    Voided (mL): 40 mL  Total OUT: 40 mL    Total NET: 10 mL          ## Imaging:    ## Medications:  MEDICATIONS  (STANDING):  cefepime   IVPB 1000 milliGRAM(s) IV Intermittent every 24 hours  chlorhexidine 0.12% Liquid 15 milliLiter(s) Oral Mucosa every 12 hours  chlorhexidine 2% Cloths 1 Application(s) Topical <User Schedule>  heparin   Injectable 5000 Unit(s) SubCutaneous every 8 hours  lactated ringers. 1000 milliLiter(s) (50 mL/Hr) IV Continuous <Continuous>  norepinephrine Infusion 0.05 MICROgram(s)/kG/Min (4.7 mL/Hr) IV Continuous <Continuous>  propofol Infusion 10 MICROgram(s)/kG/Min (3.01 mL/Hr) IV Continuous <Continuous>  vancomycin  IVPB 1000 milliGRAM(s) IV Intermittent once    MEDICATIONS  (PRN):

## 2024-08-02 NOTE — DIETITIAN INITIAL EVALUATION ADULT - REASON
AMS, pt had mittens on, pt did not seem appropriate at present, c visible severe orbital, buccal(fat) severe temple, clavicle & shoulder depletion(muscle)

## 2024-08-02 NOTE — DIETITIAN NUTRITION RISK NOTIFICATION - OTHER RISK FACTORS
"WOUND CARE PROVIDER PROGRESS NOTE        HPI: 68-year-old male homelessness, EtOH use, tobacco dependence, chronic left lower extremity wound admitted 8/7/2020 with left lower extremity wound infection.  Patient was recently hospitalized at [facility] in April 2020, evaluated by orthopedic surgery who recommended wound care.  Wound culture grew MSSA and strep.  Patient was recently seen at [facility] prior to this admission was given a prescription for antibiotics but did not fill this.  Patient reports that he has had this wound for 8 to 10 years.  He reports that he fell and went \"ass over tea kettle\".  He reports that he would clean the wound almost daily with soap and water at the homeless shelter.  Per chart review, patient reported he developed the ulcer in May 2018 when he fell while hiking.  Patient was seen at wound care clinic in August 2018.  Patient had a  assisting him while he was living at [facility] housing.  Wound VAC /was ordered however  had concerns with patient's compliancy and/ access to medical care.  Skilled nursing facility was contacted to have patient be admitted, however he was not accepted due to Medicaid.    Not on any blood thinners.  Patient ambulatory in [place].  Allergies reviewed.  He denies any allergies.    Surgery date: 11/19/2020 by Dr. Olvera  Procedure:1.  Irrigation and debridement of multiple compartments of the left leg with 2   separate 16 cm x 5 cm superficial ulcerations in posterior knee and calf.  2.  AmnioFix biologic sheet placement, left leg.  3.  Wound VAC placement, left leg, 16x5 + 16x5 cm.          Interval hx:   9/11/2020: pt calm cooperative. On zyvox. Seen during VAC and two layer compression wrap change. Pt tolerating VAC and wraps. Wound decreased in size.   9/18/2020: Patient denies any fevers, chills, nausea, vomiting.  He is tolerating the veraflo wound VAC and 2 layer compression wrap.  Wound is decreasing in size.  Increased " granular tissue noted, wound edges have improved however he does have rolled edges to popliteal fossa area.  Patient calm and cooperative, watching sports.  9/30/2020: Denies fevers, chills, nausea, vomiting.  Tolerating wound VAC and 2 layer compression wrap.  Refused nail care.  Wound culture positive for strep A and Proteus.  10/7/2020: completed 5 day course of zyvox. Denies fevers chills nausea vomiting.  Seen during veraflo VAC and 2 layer compression wrap change.    10/14/2020: Patient denies fevers, chills, nausea, vomiting.  Patient wound is malodorous complaining of increased pain to the wound.  Increased slough noted to wound bed despite veraflo wound VAC.  Reconsult ID.  10/16/2020: Patient seen during wound VAC change with [facility] wound care PT. patient denies fever, chills, nausea, vomiting.  Patient reports pain to wound and increase in pain with wound VAC change.  Patient has granular tissue throughout wound with mild amount of slough to posterior aspect of leg.  Malodorous with VAC removal.  Wound VAC nursing changed.  10/29/2020: Wound VAC was discontinued by wound team on 10/23/2020 due to slow progress and collagen was started.  Patient has completed 7-day antibiotic course of meropenem for multidrug resistant Proteus vulgaris.  Patient found to have lice and has been treated.  Nonfoul-smelling odor present with dressing removal.  11/5/2020: Seen with wound team during 2 layer compression dressing change and for excisional debridement.  No odor noted today.  Thick layer of biofilm noted.  Wound edges continue to improve but still remain to medial aspect of wound.  11/17/2020: Seen with wound team during dressing and 2 layer compression wrap change.  Patient with severely thick scar tissue to wound edges.  Excisional debridement with injectable lidocaine and epinephrine performed today.  Patient denies any fevers, chills, nausea, vomiting.  11/23/2020: POD #4 SP left leg I&D with biologic and VAC  Underweight (BMI < 19) placement.  VAC functioning.  Foul odor coming from wound.  11/30/2020: POD #11.  Patient tired of walking around with wound VAC machine.  But agreeable to continue with wound VAC.  Mild odor coming from wound with wound VAC removal.  12/7/2020: POD #18.  Seen during wound VAC change.  Sitter no longer at bedside.  12/14/2020 POD # 25.  Seen during wound VAC change with wound team.  Previous wound culture from last week showed organisms but they were not worked up.  New wound culture to be taken today.  Odor remains slightly diminished.  Drainage heavy, slightly decreased from last week.  Continue with nystatin powder and silver foam.  Patient tired today.  12/16/2020: Wound culture 12/14/2020 + for Proteus Mirabilis.  Discussed case with ID, previously following this admit.  Recommended Augmentin for 1 week.  12/17/2020: Patient not drinking boost.  Has stockpile in room.  Dietary consulted.  Started Augmentin yesterday. Contacted micro to review culture.  So far growing Proteus and diphtheroids.  Micro to assess for fungal component.  1/4/2021: Wounds have  into 2 wounds again and are decreasing in size.  No longer doing silver foam but rather Arglaes powder and nystatin powder.  Odor remains.  Completed antibiotics.  Guardianship hearing is 1/29/2021.  Had arginaid supplement 4 times per day for 2 weeks that started on 12/17/2020.  1/22/2021: Seen with wound team for VAC change.  Odor is decreased.  Skin bridges have formed and now patient has 3 smaller wounds.  However there is increased periwound breakdown.  Patient experiencing pain with VAC change to proximal thigh.  Topical lidocaine used.  Wound VAC held for weekend due to periwound breakdown.  2/5/2021: Seen with wound team during VAC change.  Odor remains the same.  There is no longer skin bridge to the medial wound  it into 2.  Patient has drainage seeping out underneath the drape causing periwound irritation.  Excisional debridement  was performed today.  Patient has guardian.  2/24/21: Seen with wound team during VAC change.  Odor remains.  Epibole to skin edges.  There is significant amount of slough and tenderness to lateral proximal thigh ulcer.  Excisional debridement performed today with injectable lidocaine and epinephrine.  Consent obtained from guardian.        Results:  Wound culture left leg 12/14/2020: No fungal growth, Proteus mirabilis  Wound culture left leg 12/7/2020:Light growth mixed organisms.   Covid screen not detected 11/17/2020  CBC 11/22/2020: WBC 6.5, hemoglobin and hematocrit 9.9/30.5.  Platelet count 403.  CBC with differential 10/14/2020: WBC 6, H&H 11/33.9  Wound culture: 9/28/2020: Positive for beta-hemolytic strep group A, Proteus.    Wound cx: 9/1/2020 mrsa, diphtheroids, beta hemolytic strep group A    8/7/2020: X-ray tib-fib left:  1.  Healed distal metaphyseal fractures of the left fibula and tibia with tibial IM layla in place.     2.  No acute fracture or dislocation.     3.  No radiopaque soft tissue foreign body.      Arterial studies:   9/2/2020  Right.    Doppler waveforms of the common femoral artery are of high amplitude and    triphasic.    Doppler waveforms at the ankle are brisk and triphasic.    Ankle-brachial index is normal.       Left.    Doppler waveforms of the common femoral artery are of high amplitude and    triphasic.    Doppler waveforms at the ankle are brisk and triphasic.    Ankle-brachial index is normal.    Unable to obtained popliteal waveforms due to wound bandages.          Exam:    Palpable PT pulse to left foot +1 foot   +2 DP left foot  Difficulty palpating popliteal due to scar tissue  Able to extend left leg to 160 degrees. Able to flex left leg around 80 degrees      Left lower leg full-thickness ulcer  Odor remains the same.  Drainage has decreased slightly  Medial wound is no longer .        Lateral ulcer:  Wound edges are rolled  Thick layer of slough and biofilm to  wound bed  Majority of slough to proximal aspect of wound where there is tenderness with palpation  No periwound erythema   periwound irritation from drainage to proximal aspect  Edema controlled  Predebridement measurement: 12.6 x 3 x 0.2 cm      Medial ulcer:  Wound edges are rolled   granular tissue with 80% slough and biofilm  Edema controlled  No periwound irritation noted  Predebridement measurement: 14 x 2.5 x 0.2 cm          Photos predebridement  Left lateral leg:      Left medial leg                  The procedure, rationale for doing so, and the possible risks- including infection, pain and bleeding- have been discussed with the patient.  The patient verbalized understanding and is agreeable with proceeding.  Consent obtained from guardian Coretta Grady.        DESCRIPTION OF PROCEDURE:  Excision debridement to left leg ulcer with injectable lidocaine and epinephrine     PMH, medications and recent labs reviewed     ANESTHESIA:   20 mL of lidocaine with epinephrine injected and 2 % viscous lidocaine, allowed to dwell for 5 min      A formal timeout was performed to confirm patient's correct name, correct site, correct procedure and correct laterality.    PROCEDURE:   Skin prepped with alcohol swabs, Chlorahexidine.  Lidocaine injected subcutaneously into several sites around the wound margins.    -Curette and forceps used to debride wound beds.  Excisional debridement was performed to remove closed wound edges, slough, fibrinous tissue devitalized tissue until healthy, bleeding tissue was visualized.   Entire surface of both wounds to medial and lateral ulcers were debrided.  Total, 105.9 cm2 debrided.  Tissue debrided into the subcutaneous layer.    -Bleeding controlled with manual pressure.    -Wound care completed by wound RNs, refer to flowsheet.  -Patient tolerated the procedure well, without c/o pain or discomfort.       Post debridement measurement:  Lateral: 15.6 x 4 x 0.3 cm  Medial: 14.5 x 3 x  0.3 cm      Post debridement photo lateral wound    Left medial leg      Left lateral leg                            ASSESSMENT/PLAN:  68-year-old male with homelessness, EtOH use, tobacco use, and chronic left leg ulcer.  SP left leg I&D with amnio fix and wound VAC placement by Dr. Olvera on 11/19/2020    -Wound has reverted back to 2 wounds.  Skin bridge to medial wound is no longer present   Wound size remains the same.  There is periwound irritation to proximal aspect of lateral wound.  hydrocolloid applied to periwound to protect it  -Slough present to both wounds however there is tenderness to proximal lateral wound.  Injectable lidocaine with epinephrine utilized to perform excisional debridement today.  Medically necessary to promote wound healing.  -Odor remains, heavy drainage slightly decreased  -Edema controlled with 2 layer compression wrap.  -Patient has completed 1 week of Augmentin and L-arginine supplement for 2 weeks in December 2020  -No plans for [facility] at this time.      PLAN  -Continue wound VAC.  Change 3 times per week  - hydrocolloid to periwound  -Vanessa, silver foam, drape to wound at 125 mmHg continuous  -Continue 3 times a week debridement to wound to reduce biofilm  -Continue 2 layer compression wrap extending to thigh    Patient to continue to be seen by wound care team while admitted   wound care nurse practitioner to follow as needed      Discharge plan:  Working on group home placement.  Patient has guardian.      D/W: Patient, RN, Sera Simons, EKATERINA, Belen Calderon, EKATERINA with wound team, Viola , guardian Coretta

## 2024-08-02 NOTE — AIRWAY REMOVAL NOTE  ADULT & PEDS - REMOVAL OF AN ARTIFICAL AIRWAY DATE AND TIME
BEHAVIORAL TEAM DISCUSSION    Participants: Avani MARTINEZ, MICHELLE Melendez, Edosn RN, Padmini RN  Progress: New patient continuing to assess  Anticipated length of stay: Pending stabilization and disposition/aftercare planning.  Continued Stay Criteria/Rationale: New patient continuing to assess  Medical/Physical: None reported   Precautions:   Behavioral Orders   Procedures     Family Assessment     Routine Programming     As clinically indicated     Self Injury Precaution     Status 15     Every 15 minutes.     Suicide precautions     Patients on Suicide Precautions should have a Combination Diet ordered that includes a Diet selection(s) AND a Behavioral Tray selection for Safe Tray - with utensils, or Safe Tray - NO utensils       Plan:Disposition plan unclear, pending stabilization and team assessment.  Rationale for change in precautions or plan: No changes reported.    02-Aug-2024 09:25

## 2024-08-03 LAB
ALBUMIN SERPL ELPH-MCNC: 1.9 G/DL — LOW (ref 3.3–5)
ALP SERPL-CCNC: 81 U/L — SIGNIFICANT CHANGE UP (ref 40–120)
ALT FLD-CCNC: 11 U/L — LOW (ref 12–78)
ANION GAP SERPL CALC-SCNC: 5 MMOL/L — SIGNIFICANT CHANGE UP (ref 5–17)
AST SERPL-CCNC: 11 U/L — LOW (ref 15–37)
BASOPHILS # BLD AUTO: 0.05 K/UL — SIGNIFICANT CHANGE UP (ref 0–0.2)
BASOPHILS NFR BLD AUTO: 0.6 % — SIGNIFICANT CHANGE UP (ref 0–2)
BILIRUB SERPL-MCNC: 0.5 MG/DL — SIGNIFICANT CHANGE UP (ref 0.2–1.2)
BUN SERPL-MCNC: 32 MG/DL — HIGH (ref 7–23)
CALCIUM SERPL-MCNC: 8.8 MG/DL — SIGNIFICANT CHANGE UP (ref 8.5–10.1)
CHLORIDE SERPL-SCNC: 111 MMOL/L — HIGH (ref 96–108)
CO2 SERPL-SCNC: 24 MMOL/L — SIGNIFICANT CHANGE UP (ref 22–31)
CREAT SERPL-MCNC: 0.91 MG/DL — SIGNIFICANT CHANGE UP (ref 0.5–1.3)
CULTURE RESULTS: ABNORMAL
EGFR: 82 ML/MIN/1.73M2 — SIGNIFICANT CHANGE UP
EOSINOPHIL # BLD AUTO: 0.15 K/UL — SIGNIFICANT CHANGE UP (ref 0–0.5)
EOSINOPHIL NFR BLD AUTO: 1.7 % — SIGNIFICANT CHANGE UP (ref 0–6)
GLUCOSE SERPL-MCNC: 78 MG/DL — SIGNIFICANT CHANGE UP (ref 70–99)
GRAM STN FLD: ABNORMAL
HCT VFR BLD CALC: 29.4 % — LOW (ref 39–50)
HGB BLD-MCNC: 9.2 G/DL — LOW (ref 13–17)
IMM GRANULOCYTES NFR BLD AUTO: 0.3 % — SIGNIFICANT CHANGE UP (ref 0–0.9)
LYMPHOCYTES # BLD AUTO: 1.64 K/UL — SIGNIFICANT CHANGE UP (ref 1–3.3)
LYMPHOCYTES # BLD AUTO: 18.7 % — SIGNIFICANT CHANGE UP (ref 13–44)
MAGNESIUM SERPL-MCNC: 2.1 MG/DL — SIGNIFICANT CHANGE UP (ref 1.6–2.6)
MCHC RBC-ENTMCNC: 24.9 PG — LOW (ref 27–34)
MCHC RBC-ENTMCNC: 31.3 G/DL — LOW (ref 32–36)
MCV RBC AUTO: 79.7 FL — LOW (ref 80–100)
MONOCYTES # BLD AUTO: 0.71 K/UL — SIGNIFICANT CHANGE UP (ref 0–0.9)
MONOCYTES NFR BLD AUTO: 8.1 % — SIGNIFICANT CHANGE UP (ref 2–14)
NEUTROPHILS # BLD AUTO: 6.19 K/UL — SIGNIFICANT CHANGE UP (ref 1.8–7.4)
NEUTROPHILS NFR BLD AUTO: 70.6 % — SIGNIFICANT CHANGE UP (ref 43–77)
NRBC # BLD: 0 /100 WBCS — SIGNIFICANT CHANGE UP (ref 0–0)
PHOSPHATE SERPL-MCNC: 2.7 MG/DL — SIGNIFICANT CHANGE UP (ref 2.5–4.5)
PLATELET # BLD AUTO: 262 K/UL — SIGNIFICANT CHANGE UP (ref 150–400)
POTASSIUM SERPL-MCNC: 4.2 MMOL/L — SIGNIFICANT CHANGE UP (ref 3.5–5.3)
POTASSIUM SERPL-SCNC: 4.2 MMOL/L — SIGNIFICANT CHANGE UP (ref 3.5–5.3)
PROT SERPL-MCNC: 5.9 GM/DL — LOW (ref 6–8.3)
RBC # BLD: 3.69 M/UL — LOW (ref 4.2–5.8)
RBC # FLD: 16.7 % — HIGH (ref 10.3–14.5)
SODIUM SERPL-SCNC: 140 MMOL/L — SIGNIFICANT CHANGE UP (ref 135–145)
SPECIMEN SOURCE: SIGNIFICANT CHANGE UP
WBC # BLD: 8.77 K/UL — SIGNIFICANT CHANGE UP (ref 3.8–10.5)
WBC # FLD AUTO: 8.77 K/UL — SIGNIFICANT CHANGE UP (ref 3.8–10.5)

## 2024-08-03 PROCEDURE — 99232 SBSQ HOSP IP/OBS MODERATE 35: CPT

## 2024-08-03 RX ADMIN — CEFEPIME HYDROCHLORIDE 100 MILLIGRAM(S): 1 INJECTION, POWDER, FOR SOLUTION INTRAMUSCULAR; INTRAVENOUS at 05:48

## 2024-08-03 RX ADMIN — HEPARIN SODIUM 5000 UNIT(S): 1000 INJECTION, SOLUTION INTRAVENOUS; SUBCUTANEOUS at 13:44

## 2024-08-03 RX ADMIN — CEFEPIME HYDROCHLORIDE 100 MILLIGRAM(S): 1 INJECTION, POWDER, FOR SOLUTION INTRAMUSCULAR; INTRAVENOUS at 17:13

## 2024-08-03 RX ADMIN — HEPARIN SODIUM 5000 UNIT(S): 1000 INJECTION, SOLUTION INTRAVENOUS; SUBCUTANEOUS at 05:48

## 2024-08-03 RX ADMIN — CHLORHEXIDINE GLUCONATE 1 APPLICATION(S): 500 CLOTH TOPICAL at 05:48

## 2024-08-03 RX ADMIN — HEPARIN SODIUM 5000 UNIT(S): 1000 INJECTION, SOLUTION INTRAVENOUS; SUBCUTANEOUS at 22:59

## 2024-08-03 NOTE — CHART NOTE - NSCHARTNOTEFT_GEN_A_CORE
89 y/o M w/severe alzheimer's dementia, emphysema, and hx of prostate CA admitted for acute hypoxemic respiratory failure requiring intubation and hypotension likely secondary to severe sepsis with septic shock briefly requiring pressors in setting of presumed aspiration PNA. ARLENE likely ATN. Now awake, and off pressors. Extubated 8/2 and on RA maintaining Spo2 >92%.     Plan:   - Continue Cefepime for PNA   - Patient currently NPO. Recurrent aspiration events. Pending official speech & swallow.   - ARLENE improving. Continue to trend. Avoid nephrotoxins. Replete lytes as needed.   - DVT prophylaxis  - Full code for now, follow up GOC discussions with family  - PT       Discussed with ICU attending Dr. Gong. Discussed with hospitalist.

## 2024-08-03 NOTE — PROGRESS NOTE ADULT - SUBJECTIVE AND OBJECTIVE BOX
HPI:  89 year old male with PHX: Alzheimrs, Dementia ( A&Ox1 at baseline) Prostate CA S/P prostatectomy, Dysphagia brought in by EMS from [facility] for SOB and difficulty breathing. Wife at bedside stated has looked progressively worse x 1 week, today was first day looked distressful. Wife does endorse difficulty eating with small aspiration events. Denies any fevers, cough not associated with eating, CP, ABD pain, N/V/D, dysuria.  (01 Aug 2024 18:31)      24 hr events: Extubated without complication    ## ROS:  [x ] unable to obtain      ## Vitals  ICU Vital Signs Last 24 Hrs  T(C): 36.9 (03 Aug 2024 06:00), Max: 37.1 (02 Aug 2024 16:30)  T(F): 98.4 (03 Aug 2024 06:00), Max: 98.8 (02 Aug 2024 16:30)  HR: 76 (03 Aug 2024 06:00) (57 - 76)  BP: 152/87 (03 Aug 2024 06:00) (126/66 - 165/77)  BP(mean): 107 (03 Aug 2024 06:00) (81 - 116)  ABP: --  ABP(mean): --  RR: 22 (03 Aug 2024 06:00) (15 - 29)  SpO2: 93% (03 Aug 2024 06:00) (93% - 100%)    O2 Parameters below as of 02 Aug 2024 19:05  Patient On (Oxygen Delivery Method): room air            ## Physical Exam:  Gen: Elderly male lying comfortably in bed, NAD  HEENT: NC/AT sclerae anicteric  Resp: No increased WOB, CTAB  CV: S1, S2  Abd: Soft, + BS  Ext: WWP  Neuro: Awake, moves extremities    ## Vent Data  Mode: CPAP with PS  FiO2: 30  PEEP: 5  PS: 5  ITime: 1  MAP: 7  PIP: 11      ## Labs:  Chem:  08-03    140  |  111<H>  |  32<H>  ----------------------------<  78  4.2   |  24  |  0.91    Ca    8.8      03 Aug 2024 03:00  Phos  2.7     08-03  Mg     2.1     08-03    TPro  5.9<L>  /  Alb  1.9<L>  /  TBili  0.5  /  DBili  x   /  AST  11<L>  /  ALT  11<L>  /  AlkPhos  81  08-03    LIVER FUNCTIONS - ( 03 Aug 2024 03:00 )  Alb: 1.9 g/dL / Pro: 5.9 gm/dL / ALK PHOS: 81 U/L / ALT: 11 U/L / AST: 11 U/L / GGT: x           CBC:                        9.2    8.77  )-----------( 262      ( 03 Aug 2024 03:00 )             29.4     Coags:  PT/INR - ( 01 Aug 2024 15:40 )   PT: 11.8 sec;   INR: 0.98 ratio         PTT - ( 01 Aug 2024 15:40 )  PTT:33.2 sec    Urinalysis Basic - ( 03 Aug 2024 03:00 )    Color: x / Appearance: x / SG: x / pH: x  Gluc: 78 mg/dL / Ketone: x  / Bili: x / Urobili: x   Blood: x / Protein: x / Nitrite: x   Leuk Esterase: x / RBC: x / WBC x   Sq Epi: x / Non Sq Epi: x / Bacteria: x        ## Cardiac        ## Blood Gas  ABG - ( 01 Aug 2024 16:35 )  pH, Arterial: 7.33  pH, Blood: x     /  pCO2: 45    /  pO2: 360   / HCO3: 24    / Base Excess: -2.2  /  SaO2: 100.0               #I/Os  I&O's Detail    02 Aug 2024 07:01  -  03 Aug 2024 07:00  --------------------------------------------------------  IN:    IV PiggyBack: 300 mL    Lactated Ringers: 50 mL  Total IN: 350 mL    OUT:    Incontinent per Condom Catheter (mL): 40 mL    Norepinephrine: 0 mL    Propofol: 0 mL    Voided (mL): 370 mL  Total OUT: 410 mL    Total NET: -60 mL          ## Imaging:    ## Medications:  MEDICATIONS  (STANDING):  cefepime   IVPB 1000 milliGRAM(s) IV Intermittent every 12 hours  chlorhexidine 2% Cloths 1 Application(s) Topical <User Schedule>  heparin   Injectable 5000 Unit(s) SubCutaneous every 8 hours    MEDICATIONS  (PRN):

## 2024-08-03 NOTE — PROGRESS NOTE ADULT - ASSESSMENT
89 y/o M w/severe alzheimer's dementia, emphysema, and hx of prostate CA admitted for acute hypoxemic respiratory failure requiring intubation and hypotension likely secondary to severe sepsis with septic shock briefly requiring pressors in setting of presumed aspiration PNA. ARLENE likely ATN. Now awake, and off pressors.    - Complete course of abx  - Trend Cr, avoid nephrotoxins  - DVT prophylaxis  - Downgrade to medicine

## 2024-08-04 LAB
ALBUMIN SERPL ELPH-MCNC: 2.1 G/DL — LOW (ref 3.3–5)
ALP SERPL-CCNC: 81 U/L — SIGNIFICANT CHANGE UP (ref 40–120)
ALT FLD-CCNC: 11 U/L — LOW (ref 12–78)
ANION GAP SERPL CALC-SCNC: 8 MMOL/L — SIGNIFICANT CHANGE UP (ref 5–17)
AST SERPL-CCNC: 12 U/L — LOW (ref 15–37)
BILIRUB SERPL-MCNC: 0.5 MG/DL — SIGNIFICANT CHANGE UP (ref 0.2–1.2)
BUN SERPL-MCNC: 32 MG/DL — HIGH (ref 7–23)
CALCIUM SERPL-MCNC: 8.9 MG/DL — SIGNIFICANT CHANGE UP (ref 8.5–10.1)
CHLORIDE SERPL-SCNC: 107 MMOL/L — SIGNIFICANT CHANGE UP (ref 96–108)
CO2 SERPL-SCNC: 25 MMOL/L — SIGNIFICANT CHANGE UP (ref 22–31)
CREAT SERPL-MCNC: 0.94 MG/DL — SIGNIFICANT CHANGE UP (ref 0.5–1.3)
EGFR: 78 ML/MIN/1.73M2 — SIGNIFICANT CHANGE UP
GLUCOSE SERPL-MCNC: 70 MG/DL — SIGNIFICANT CHANGE UP (ref 70–99)
HCT VFR BLD CALC: 31.1 % — LOW (ref 39–50)
HGB BLD-MCNC: 9.7 G/DL — LOW (ref 13–17)
MAGNESIUM SERPL-MCNC: 2.2 MG/DL — SIGNIFICANT CHANGE UP (ref 1.6–2.6)
MCHC RBC-ENTMCNC: 24.7 PG — LOW (ref 27–34)
MCHC RBC-ENTMCNC: 31.2 G/DL — LOW (ref 32–36)
MCV RBC AUTO: 79.3 FL — LOW (ref 80–100)
NRBC # BLD: 0 /100 WBCS — SIGNIFICANT CHANGE UP (ref 0–0)
PHOSPHATE SERPL-MCNC: 2.8 MG/DL — SIGNIFICANT CHANGE UP (ref 2.5–4.5)
PLATELET # BLD AUTO: 299 K/UL — SIGNIFICANT CHANGE UP (ref 150–400)
POTASSIUM SERPL-MCNC: 4.2 MMOL/L — SIGNIFICANT CHANGE UP (ref 3.5–5.3)
POTASSIUM SERPL-SCNC: 4.2 MMOL/L — SIGNIFICANT CHANGE UP (ref 3.5–5.3)
PROT SERPL-MCNC: 6.2 GM/DL — SIGNIFICANT CHANGE UP (ref 6–8.3)
RBC # BLD: 3.92 M/UL — LOW (ref 4.2–5.8)
RBC # FLD: 16.7 % — HIGH (ref 10.3–14.5)
SODIUM SERPL-SCNC: 140 MMOL/L — SIGNIFICANT CHANGE UP (ref 135–145)
WBC # BLD: 9.18 K/UL — SIGNIFICANT CHANGE UP (ref 3.8–10.5)
WBC # FLD AUTO: 9.18 K/UL — SIGNIFICANT CHANGE UP (ref 3.8–10.5)

## 2024-08-04 RX ADMIN — CEFEPIME HYDROCHLORIDE 100 MILLIGRAM(S): 1 INJECTION, POWDER, FOR SOLUTION INTRAMUSCULAR; INTRAVENOUS at 05:32

## 2024-08-04 RX ADMIN — HEPARIN SODIUM 5000 UNIT(S): 1000 INJECTION, SOLUTION INTRAVENOUS; SUBCUTANEOUS at 14:09

## 2024-08-04 RX ADMIN — CEFEPIME HYDROCHLORIDE 100 MILLIGRAM(S): 1 INJECTION, POWDER, FOR SOLUTION INTRAMUSCULAR; INTRAVENOUS at 17:19

## 2024-08-04 RX ADMIN — HEPARIN SODIUM 5000 UNIT(S): 1000 INJECTION, SOLUTION INTRAVENOUS; SUBCUTANEOUS at 05:32

## 2024-08-04 RX ADMIN — HEPARIN SODIUM 5000 UNIT(S): 1000 INJECTION, SOLUTION INTRAVENOUS; SUBCUTANEOUS at 22:19

## 2024-08-04 RX ADMIN — CHLORHEXIDINE GLUCONATE 1 APPLICATION(S): 500 CLOTH TOPICAL at 05:33

## 2024-08-04 NOTE — PROGRESS NOTE ADULT - SUBJECTIVE AND OBJECTIVE BOX
INTERVAL HPI/OVERNIGHT EVENTS:  Pt  well  known  to  me  from  [facility]   chert  reviewed  patient  examined    89 y/o M w/severe alzheimer's dementia, emphysema, and hx of prostate CA admitted for acute hypoxemic respiratory failure requiring intubation and hypotension likely secondary to severe sepsis with septic shock briefly requiring pressors in setting of presumed aspiration PNA. ARLENE likely ATN. Now awake, and off pressors. Extubated 8/2 and on RA maintaining Spo2 >92%.     patient  transferred to  medical  floor  poor  historian  2/2  dementia      REVIEW OF SYSTEMS:  CONSTITUTIONAL: somnolent  easily  arousable   poorly  communicative moves  all extr      MEDICATION:  cefepime   IVPB 1000 milliGRAM(s) IV Intermittent every 12 hours  chlorhexidine 2% Cloths 1 Application(s) Topical <User Schedule>  heparin   Injectable 5000 Unit(s) SubCutaneous every 8 hours    Vital Signs Last 24 Hrs  T(C): 36.4 (04 Aug 2024 04:52), Max: 37.2 (03 Aug 2024 16:00)  T(F): 97.5 (04 Aug 2024 04:52), Max: 99 (03 Aug 2024 16:00)  HR: 69 (04 Aug 2024 04:52) (68 - 89)  BP: 163/76 (04 Aug 2024 04:52) (142/87 - 163/76)  BP(mean): 102 (03 Aug 2024 16:00) (99 - 102)  RR: 16 (04 Aug 2024 04:52) (16 - 24)  SpO2: 94% (04 Aug 2024 04:52) (80% - 95%)    Parameters below as of 03 Aug 2024 19:01  Patient On (Oxygen Delivery Method): room air        PHYSICAL EXAM:  GENERAL: NAD,  HEENT : Conjuntivae  clear sclerae anicteric  NECK: Supple, No JVD, Normal thyroid  NERVOUS SYSTEM:  poorly  communicative  moves  all extr  CHEST/LUNG: rinchis   HEART: Regular rate and rhythm; No murmurs, rubs, or gallops  ABDOMEN: Soft, Nontender, Nondistended; Bowel sounds present  EXTREMITIES:  no  edema no  tenderness  SKIN: No rashes   LABS:                        9.7    9.18  )-----------( 299      ( 04 Aug 2024 05:20 )             31.1     08-04    140  |  107  |  32<H>  ----------------------------<  70  4.2   |  25  |  0.94    Ca    8.9      04 Aug 2024 05:20  Phos  2.8     08-04  Mg     2.2     08-04    TPro  6.2  /  Alb  2.1<L>  /  TBili  0.5  /  DBili  x   /  AST  12<L>  /  ALT  11<L>  /  AlkPhos  81  08-04      Urinalysis Basic - ( 04 Aug 2024 05:20 )    Color: x / Appearance: x / SG: x / pH: x  Gluc: 70 mg/dL / Ketone: x  / Bili: x / Urobili: x   Blood: x / Protein: x / Nitrite: x   Leuk Esterase: x / RBC: x / WBC x   Sq Epi: x / Non Sq Epi: x / Bacteria: x      CAPILLARY BLOOD GLUCOSE          RADIOLOGY & ADDITIONAL TESTS:    Imaging reports  Personally Reviewed:  [x ] YES  [ ] NO    Consultant(s) Notes Reviewed:  [x ] YES  [ ] NO    Care Discussed with Consultants/Other Providers [x ] YES  [ ] NO  Assessment and Plan:   	  89 y/o M w/severe alzheimer's dementia, emphysema, and hx of prostate CA admitted for acute hypoxemic respiratory failure requiring intubation and hypotension likely secondary to severe sepsis with septic shock briefly requiring pressors in setting of presumed aspiration PNA. ARLENE likely ATN. Now awake, and off pressors.    - Complete course of abx  - DVT prophylaxis  For  speech and swallow  evluation for palliative  care  eval  discused  with  pt's  wife    would  like  Gt placemetn  if  patient  unable  to  swallow

## 2024-08-05 DIAGNOSIS — Z51.5 ENCOUNTER FOR PALLIATIVE CARE: ICD-10-CM

## 2024-08-05 DIAGNOSIS — F03.90 UNSPECIFIED DEMENTIA, UNSPECIFIED SEVERITY, WITHOUT BEHAVIORAL DISTURBANCE, PSYCHOTIC DISTURBANCE, MOOD DISTURBANCE, AND ANXIETY: ICD-10-CM

## 2024-08-05 DIAGNOSIS — J96.01 ACUTE RESPIRATORY FAILURE WITH HYPOXIA: ICD-10-CM

## 2024-08-05 DIAGNOSIS — E43 UNSPECIFIED SEVERE PROTEIN-CALORIE MALNUTRITION: ICD-10-CM

## 2024-08-05 DIAGNOSIS — R53.2 FUNCTIONAL QUADRIPLEGIA: ICD-10-CM

## 2024-08-05 LAB
ALBUMIN SERPL ELPH-MCNC: 2.2 G/DL — LOW (ref 3.3–5)
ALP SERPL-CCNC: 91 U/L — SIGNIFICANT CHANGE UP (ref 40–120)
ALT FLD-CCNC: 13 U/L — SIGNIFICANT CHANGE UP (ref 12–78)
ANION GAP SERPL CALC-SCNC: 10 MMOL/L — SIGNIFICANT CHANGE UP (ref 5–17)
AST SERPL-CCNC: 14 U/L — LOW (ref 15–37)
BILIRUB SERPL-MCNC: 0.4 MG/DL — SIGNIFICANT CHANGE UP (ref 0.2–1.2)
BUN SERPL-MCNC: 34 MG/DL — HIGH (ref 7–23)
CALCIUM SERPL-MCNC: 9.4 MG/DL — SIGNIFICANT CHANGE UP (ref 8.5–10.1)
CHLORIDE SERPL-SCNC: 108 MMOL/L — SIGNIFICANT CHANGE UP (ref 96–108)
CO2 SERPL-SCNC: 22 MMOL/L — SIGNIFICANT CHANGE UP (ref 22–31)
CREAT SERPL-MCNC: 0.9 MG/DL — SIGNIFICANT CHANGE UP (ref 0.5–1.3)
EGFR: 83 ML/MIN/1.73M2 — SIGNIFICANT CHANGE UP
FOLATE SERPL-MCNC: 8.7 NG/ML — SIGNIFICANT CHANGE UP
GLUCOSE SERPL-MCNC: 75 MG/DL — SIGNIFICANT CHANGE UP (ref 70–99)
HCT VFR BLD CALC: 34.7 % — LOW (ref 39–50)
HGB BLD-MCNC: 10.8 G/DL — LOW (ref 13–17)
IRON SATN MFR SERPL: 13 % — LOW (ref 16–55)
IRON SATN MFR SERPL: 26 UG/DL — LOW (ref 45–165)
MCHC RBC-ENTMCNC: 24.9 PG — LOW (ref 27–34)
MCHC RBC-ENTMCNC: 31.1 G/DL — LOW (ref 32–36)
MCV RBC AUTO: 80.1 FL — SIGNIFICANT CHANGE UP (ref 80–100)
NRBC # BLD: 0 /100 WBCS — SIGNIFICANT CHANGE UP (ref 0–0)
PLATELET # BLD AUTO: 291 K/UL — SIGNIFICANT CHANGE UP (ref 150–400)
POTASSIUM SERPL-MCNC: 4.5 MMOL/L — SIGNIFICANT CHANGE UP (ref 3.5–5.3)
POTASSIUM SERPL-SCNC: 4.5 MMOL/L — SIGNIFICANT CHANGE UP (ref 3.5–5.3)
PROT SERPL-MCNC: 6.5 GM/DL — SIGNIFICANT CHANGE UP (ref 6–8.3)
RBC # BLD: 4.33 M/UL — SIGNIFICANT CHANGE UP (ref 4.2–5.8)
RBC # FLD: 16.4 % — HIGH (ref 10.3–14.5)
SODIUM SERPL-SCNC: 140 MMOL/L — SIGNIFICANT CHANGE UP (ref 135–145)
TIBC SERPL-MCNC: 196 UG/DL — LOW (ref 220–430)
UIBC SERPL-MCNC: 169 UG/DL — SIGNIFICANT CHANGE UP (ref 110–370)
VIT B12 SERPL-MCNC: 837 PG/ML — SIGNIFICANT CHANGE UP (ref 232–1245)
WBC # BLD: 9.94 K/UL — SIGNIFICANT CHANGE UP (ref 3.8–10.5)
WBC # FLD AUTO: 9.94 K/UL — SIGNIFICANT CHANGE UP (ref 3.8–10.5)

## 2024-08-05 PROCEDURE — 99497 ADVNCD CARE PLAN 30 MIN: CPT | Mod: 25

## 2024-08-05 PROCEDURE — 99223 1ST HOSP IP/OBS HIGH 75: CPT

## 2024-08-05 RX ADMIN — CEFEPIME HYDROCHLORIDE 100 MILLIGRAM(S): 1 INJECTION, POWDER, FOR SOLUTION INTRAMUSCULAR; INTRAVENOUS at 05:49

## 2024-08-05 RX ADMIN — HEPARIN SODIUM 5000 UNIT(S): 1000 INJECTION, SOLUTION INTRAVENOUS; SUBCUTANEOUS at 05:49

## 2024-08-05 RX ADMIN — CEFEPIME HYDROCHLORIDE 100 MILLIGRAM(S): 1 INJECTION, POWDER, FOR SOLUTION INTRAMUSCULAR; INTRAVENOUS at 19:16

## 2024-08-05 RX ADMIN — HEPARIN SODIUM 5000 UNIT(S): 1000 INJECTION, SOLUTION INTRAVENOUS; SUBCUTANEOUS at 19:15

## 2024-08-05 RX ADMIN — CHLORHEXIDINE GLUCONATE 1 APPLICATION(S): 500 CLOTH TOPICAL at 05:48

## 2024-08-05 NOTE — PROGRESS NOTE ADULT - SUBJECTIVE AND OBJECTIVE BOX
INTERVAL HPI/OVERNIGHT EVENTS:        REVIEW OF SYSTEMS:  CONSTITUTIONAL:  somnolent  arousable  confused        MEDICATION:  cefepime   IVPB 1000 milliGRAM(s) IV Intermittent every 12 hours  chlorhexidine 2% Cloths 1 Application(s) Topical <User Schedule>  heparin   Injectable 5000 Unit(s) SubCutaneous every 8 hours    Vital Signs Last 24 Hrs  T(C): 36.7 (05 Aug 2024 05:00), Max: 37 (04 Aug 2024 23:30)  T(F): 98 (05 Aug 2024 05:00), Max: 98.6 (04 Aug 2024 23:30)  HR: 66 (05 Aug 2024 05:00) (66 - 76)  BP: 157/73 (05 Aug 2024 05:00) (143/85 - 171/75)  BP(mean): --  RR: 17 (05 Aug 2024 05:00) (17 - 17)  SpO2: 96% (05 Aug 2024 05:00) (82% - 98%)    Parameters below as of 05 Aug 2024 05:00  Patient On (Oxygen Delivery Method): nasal cannula        PHYSICAL EXAM:  GENERAL: NAD, cachectic  HEENT : Conjuntivae  clear sclerae anicteric  NECK: Supple, No JVD, Normal thyroid  NERVOUS SYSTEM:  somnolent  arouseable  confused moves  all  extr  CHEST/LUNG: Clear    HEART: Regular rate and rhythm; No murmurs, rubs, or gallops  ABDOMEN: Soft, Nontender, Nondistended; Bowel sounds present  EXTREMITIES:  no  edema no  tenderness  SKIN: No rashes   LABS:                        10.8   9.94  )-----------( 291      ( 05 Aug 2024 06:35 )             34.7     08-05    140  |  108  |  34<H>  ----------------------------<  75  4.5   |  22  |  0.90    Ca    9.4      05 Aug 2024 06:35  Phos  2.8     08-04  Mg     2.2     08-04    TPro  6.5  /  Alb  2.2<L>  /  TBili  0.4  /  DBili  x   /  AST  14<L>  /  ALT  13  /  AlkPhos  91  08-05      Urinalysis Basic - ( 05 Aug 2024 06:35 )    Color: x / Appearance: x / SG: x / pH: x  Gluc: 75 mg/dL / Ketone: x  / Bili: x / Urobili: x   Blood: x / Protein: x / Nitrite: x   Leuk Esterase: x / RBC: x / WBC x   Sq Epi: x / Non Sq Epi: x / Bacteria: x      CAPILLARY BLOOD GLUCOSE          RADIOLOGY & ADDITIONAL TESTS:    Imaging reports  Personally Reviewed:  [ ] YES  [ ] NO    Consultant(s) Notes Reviewed:  [ ] YES  [ ] NO    Care Discussed with Consultants/Other Providers [x ] YES  [ ] NO  Assessment and Plan:   	  88 y/o M w/severe alzheimer's dementia, emphysema, and hx of prostate CA admitted for acute hypoxemic respiratory failure requiring intubation and hypotension likely secondary to severe sepsis with septic shock briefly requiring pressors in setting of presumed aspiration PNA. ARLENE likely ATN. Now awake, and off pressors.     # dysphagia  aspiration  pneumonia  dementia    - Complete course of abx  - DVT prophylaxis  For  speech and swallow  evluation for palliative  care  eval  discused  with  pt's  wife   would  like  Gt placemetn  if  patient  unable  to  swallow    # htn  controlled  will  Rx  with  IV  hydralzine  prn    # advanced  dementia  on  aricept  namenda   being  held  for  now  unclear  advantage  at  this  stage  of  dementia    # hx  prostate  ca s/p  prostatectomy 1992     artificial  urinary  sphincter  2017  on  Lupron q  3 months

## 2024-08-05 NOTE — CONSULT NOTE ADULT - PROBLEM SELECTOR RECOMMENDATION 4
2/2 advanced dementia, prostate CA, comorbidities, dependent for care, NH resident. Freq positioning, off loading

## 2024-08-05 NOTE — CONSULT NOTE ADULT - PROBLEM SELECTOR RECOMMENDATION 2
advanced, min verbal, WCbound at baseline per wife. hospice eligible depending on goals, wife does not appear ready

## 2024-08-05 NOTE — CONSULT NOTE ADULT - PROBLEM SELECTOR RECOMMENDATION 5
GOC discussion as above. Wife is surrogate, patient also has 2 daughters from a previous marriage. Palliative will follow. Hospice eligible however wife not ready, wishes to continue w medical management and rehospitalization as needed. She will think about advance directives and feeding tubes, wishes to discuss w his daughters.

## 2024-08-05 NOTE — CONSULT NOTE ADULT - PROBLEM SELECTOR RECOMMENDATION 9
adm w septic shock, acute hypoxic resp failure req intubation, pressor support, 2/2 asp PNA, on antibiotics, extubated 8/2, downgraded from ICU 8/3. O2 stable on NC.

## 2024-08-05 NOTE — CONSULT NOTE ADULT - SUBJECTIVE AND OBJECTIVE BOX
Consult to: Discuss complex medical decision making related to goals of care    [facility] Palliative Care Consult Service:   MD Lindsay Waters NP Lori Finkel, NP    May contact any member of Palliative Care team via Microsoft Teams for consults or questions       HPI:  89 year old male with PHX: Alzheimrs, Dementia ( A&Ox1 at baseline) Prostate CA S/P prostatectomy, Dysphagia brought in by EMS from [facility] for SOB and difficulty breathing. Wife at bedside stated has looked progressively worse x 1 week, today was first day looked distressful. Wife does endorse difficulty eating with small aspiration events. Denies any fevers, cough not associated with eating, CP, ABD pain, N/V/D, dysuria.  (01 Aug 2024 18:31)    Interval history: downgraded from ICU 8/3, O2 stable on NC, lethargic, NAD. SLP eval pending.       PAST MEDICAL & SURGICAL HISTORY:  Prostate ca  1992, Lupron Inj Q 3 months      Vertigo  2005 and 12/2016, uses meclizine PRN, last episode 12/2016      Mcgrath (hard of hearing)  uses hearing aids PRN      Hypertension      Fall  3/15/17, slipped on ice , injured left side of the body, denies broken bones, still with minor soreness.      History of prostate surgery  1992      Status post implantation of artificial urinary sphincter  1996      History of knee replacement, total, left  1999, s/p revision 2001          FAMILY HISTORY:  FH: HTN (hypertension)       SOCIAL HISTORY: , has 2 daughters  Admitted from:       [facility]   Orthodoxy/spirituality:   [ none ] Substance abuse, [ none ] Tobacco hx, [ none ] Alcohol hx  [ none ] Home Opioid use      Baseline ADLs (prior to admission): min verbal, WCbound, dependent for care    Review of systems:          PAIN AD Score: 1    [email or web address] (press ctrl +  left click to view)  CPOT:    https://www.[facility].org/getattachment/[email or web address]/Critical-Care-Pain-Observation-Tool-(CPOT)     Unable to obtain/Limited due to poor mental status    Allergies    penicillin (Hives)    Intolerances           MEDICATIONS  (STANDING):  cefepime   IVPB 1000 milliGRAM(s) IV Intermittent every 12 hours  chlorhexidine 2% Cloths 1 Application(s) Topical <User Schedule>  heparin   Injectable 5000 Unit(s) SubCutaneous every 8 hours    MEDICATIONS  (PRN):      PHYSICAL EXAM:  Vital Signs Last 24 Hrs  T(C): 36.4 (05 Aug 2024 13:11), Max: 37 (04 Aug 2024 23:30)  T(F): 97.6 (05 Aug 2024 13:11), Max: 98.6 (04 Aug 2024 23:30)  HR: 62 (05 Aug 2024 13:11) (62 - 76)  BP: 143/85 (05 Aug 2024 13:11) (143/85 - 157/73)  BP(mean): --  RR: 18 (05 Aug 2024 13:11) (17 - 18)  SpO2: 96% (05 Aug 2024 05:00) (82% - 98%)    Parameters below as of 05 Aug 2024 05:00  Patient On (Oxygen Delivery Method): nasal cannula         Palliative Performance Scale/Karnofsky Score: 20     GENERAL: lethargic, opens eyes, cachectic, NAD  HEENT: Atraumatic, oropharynx clear, neck supple  CHEST/LUNG: unlabored  HEART: Regular rate and rhythm    ABDOMEN: Soft, Nontender, Nondistended   MUSCULOSKELETAL:  No  edema,  bedbound  NERVOUS SYSTEM:  lethargic, not following commands  SKIN: St 1 decubiti  noted  Oral intake: npo     LABS:                        10.8   9.94  )-----------( 291      ( 05 Aug 2024 06:35 )             34.7     08-05    140  |  108  |  34<H>  ----------------------------<  75  4.5   |  22  |  0.90    Ca    9.4      05 Aug 2024 06:35  Phos  2.8     08-04  Mg     2.2     08-04    TPro  6.5  /  Alb  2.2<L>  /  TBili  0.4  /  DBili  x   /  AST  14<L>  /  ALT  13  /  AlkPhos  91  08-05    Urinalysis Basic - ( 05 Aug 2024 06:35 )    Color: x / Appearance: x / SG: x / pH: x  Gluc: 75 mg/dL / Ketone: x  / Bili: x / Urobili: x   Blood: x / Protein: x / Nitrite: x   Leuk Esterase: x / RBC: x / WBC x   Sq Epi: x / Non Sq Epi: x / Bacteria: x        RADIOLOGY & ADDITIONAL STUDIES:  < from: CT Angio Chest PE Protocol w/ IV Cont (08.01.24 @ 16:54) >  IMPRESSION: No pulmonary arterial emboli.    Bilateral mid to lower lung nodular and confluent consolidations, most   notable within the lower lobes with associated volume loss suggestive of   combination of atelectasis and pneumonia with endobronchial spread.    Opacification of large portions of the lower lung central and distal   airways as described above related to an internal secretions and/or   aspiration.    Right upper lobe 1.3 cm indeterminate pulmonary nodule.    1-3 month follow-up noncontrast chest CT is recommended for further   evaluation of the above findings.    Trace bilateral pleural effusions.    Bilateral pleural calcifications which can be is seen in the setting of   prior asbestos exposure. Right lower hemithorax pleural thickening.    --- End of Report ---            Irwin Shirley MD  This document has been electronically signed. Aug  1 2024  5:26PM    < end of copied text >

## 2024-08-05 NOTE — CONSULT NOTE ADULT - PROBLEM SELECTOR RECOMMENDATION 3
Clinical evidence indicates that the patient has Severe protein calorie malnutrition/ 3rd degree    In context of      Chronic Illness (>1 month)    Energy/Food intake <50% of estimated energy requirement >5 days  Weight loss: Moderate - severe (lbs lost recently)  Body Fat loss: Severe   (Cachexia, temporal wasting,  muscle atrophy)  Muscle mass loss: Severe  (Skin failure/pressure ulcers)    Strength: weakened severe (bedbound)    Recommend:   aspiration precautions  nutrition consult  SLP eval pending  Discussed risks/benefits of artificial nutrition/PEG vs comfort feeds, wife to consider, leaning toward feeding tube if needed

## 2024-08-05 NOTE — CONSULT NOTE ADULT - CONVERSATION DETAILS
met with wife at bedside regarding trajectory of advancing dementia, current condition, goals of care, advance directives. Wife indicated that patient is minimally verbal and WCbound at baseline, dependent for care, NH resident. She feels that he has been losing weight due to lack of attention at the NH. Discussed patient will remain at risk for further complications, hospitalizations, decline due to advanced illness, discussed what type of quality of life would be most important to him at this time. She indicated that he never expressed his wishes, however feels that he is strong and a "fighter." She wants to feel like she is giving him a chance, but also does not want him beat up, acknowledges that he is very weak. Addressed risks/benefits of LST such as CPR, intubation in the context of his debilitated state and comorbidities. She feels that she likely would prefer to allow for a natural death if his heart stops, has hesitated to sign a DNR at the NH because she has been afraid that he will be neglected. She does feel that she would be agreeable w trial of intubation if necessary since he was able to come off the machine this hospitalization. Discussed that it is a big stressor on someone so debilitated so there is a chance that he may not be able to come off next time. Also addressed risks/benefits of artificial nutrition/PEG vs comfort feeds, would still be at risk for aspiration, not recommended in advanced illness as unlikely to improve quality of life and likely more burdensome in his condition. She will consider it, but feels like she would want to give him a chance. She wishes to discuss further w her stepdaughters prior to making further decisions. patient's wife expressed a strong sense of bakari that helps to guide her,  support offered but declined. Support provided.

## 2024-08-06 LAB
ALBUMIN SERPL ELPH-MCNC: 2.2 G/DL — LOW (ref 3.3–5)
ALP SERPL-CCNC: 86 U/L — SIGNIFICANT CHANGE UP (ref 40–120)
ALT FLD-CCNC: 11 U/L — LOW (ref 12–78)
ANION GAP SERPL CALC-SCNC: 7 MMOL/L — SIGNIFICANT CHANGE UP (ref 5–17)
AST SERPL-CCNC: 14 U/L — LOW (ref 15–37)
BILIRUB SERPL-MCNC: 0.4 MG/DL — SIGNIFICANT CHANGE UP (ref 0.2–1.2)
BUN SERPL-MCNC: 36 MG/DL — HIGH (ref 7–23)
CALCIUM SERPL-MCNC: 9.6 MG/DL — SIGNIFICANT CHANGE UP (ref 8.5–10.1)
CHLORIDE SERPL-SCNC: 111 MMOL/L — HIGH (ref 96–108)
CO2 SERPL-SCNC: 24 MMOL/L — SIGNIFICANT CHANGE UP (ref 22–31)
CREAT SERPL-MCNC: 0.9 MG/DL — SIGNIFICANT CHANGE UP (ref 0.5–1.3)
EGFR: 83 ML/MIN/1.73M2 — SIGNIFICANT CHANGE UP
GLUCOSE SERPL-MCNC: 82 MG/DL — SIGNIFICANT CHANGE UP (ref 70–99)
HCT VFR BLD CALC: 33.6 % — LOW (ref 39–50)
HGB BLD-MCNC: 10.3 G/DL — LOW (ref 13–17)
MCHC RBC-ENTMCNC: 24.8 PG — LOW (ref 27–34)
MCHC RBC-ENTMCNC: 30.7 G/DL — LOW (ref 32–36)
MCV RBC AUTO: 81 FL — SIGNIFICANT CHANGE UP (ref 80–100)
NRBC # BLD: 0 /100 WBCS — SIGNIFICANT CHANGE UP (ref 0–0)
PLATELET # BLD AUTO: 320 K/UL — SIGNIFICANT CHANGE UP (ref 150–400)
POTASSIUM SERPL-MCNC: 4.6 MMOL/L — SIGNIFICANT CHANGE UP (ref 3.5–5.3)
POTASSIUM SERPL-SCNC: 4.6 MMOL/L — SIGNIFICANT CHANGE UP (ref 3.5–5.3)
PROT SERPL-MCNC: 6.5 GM/DL — SIGNIFICANT CHANGE UP (ref 6–8.3)
RBC # BLD: 4.15 M/UL — LOW (ref 4.2–5.8)
RBC # FLD: 16.7 % — HIGH (ref 10.3–14.5)
SODIUM SERPL-SCNC: 142 MMOL/L — SIGNIFICANT CHANGE UP (ref 135–145)
WBC # BLD: 10.97 K/UL — HIGH (ref 3.8–10.5)
WBC # FLD AUTO: 10.97 K/UL — HIGH (ref 3.8–10.5)

## 2024-08-06 PROCEDURE — 99233 SBSQ HOSP IP/OBS HIGH 50: CPT

## 2024-08-06 RX ADMIN — HEPARIN SODIUM 5000 UNIT(S): 1000 INJECTION, SOLUTION INTRAVENOUS; SUBCUTANEOUS at 05:59

## 2024-08-06 RX ADMIN — HEPARIN SODIUM 5000 UNIT(S): 1000 INJECTION, SOLUTION INTRAVENOUS; SUBCUTANEOUS at 18:43

## 2024-08-06 RX ADMIN — CHLORHEXIDINE GLUCONATE 1 APPLICATION(S): 500 CLOTH TOPICAL at 06:04

## 2024-08-06 RX ADMIN — CEFEPIME HYDROCHLORIDE 100 MILLIGRAM(S): 1 INJECTION, POWDER, FOR SOLUTION INTRAMUSCULAR; INTRAVENOUS at 05:59

## 2024-08-06 RX ADMIN — HEPARIN SODIUM 5000 UNIT(S): 1000 INJECTION, SOLUTION INTRAVENOUS; SUBCUTANEOUS at 01:21

## 2024-08-06 RX ADMIN — HEPARIN SODIUM 5000 UNIT(S): 1000 INJECTION, SOLUTION INTRAVENOUS; SUBCUTANEOUS at 21:41

## 2024-08-06 RX ADMIN — CEFEPIME HYDROCHLORIDE 100 MILLIGRAM(S): 1 INJECTION, POWDER, FOR SOLUTION INTRAMUSCULAR; INTRAVENOUS at 18:43

## 2024-08-06 NOTE — SWALLOW BEDSIDE ASSESSMENT ADULT - H & P REVIEW
89 year old male with PHX: Alzheimrs, Dementia ( A&Ox1 at baseline) Prostate CA S/P prostatectomy, Dysphagia brought in by EMS from [facility] for SOB and difficulty breathing. Wife at bedside stated has looked progressively worse x 1 week, today was first day looked distressful. Wife does endorse difficulty eating with small aspiration events./yes

## 2024-08-06 NOTE — SWALLOW BEDSIDE ASSESSMENT ADULT - SLP PERTINENT HISTORY OF CURRENT PROBLEM
acute respiratory failure, pneumonia, aspiration, sepsis. Chest CT 8/1/24: bilateral mid-lower lung consolidation.

## 2024-08-06 NOTE — PROGRESS NOTE ADULT - SUBJECTIVE AND OBJECTIVE BOX
follow up on:  complex medical decision making related to goals of care      OVERNIGHT EVENTS: no overnight events. SLP pending.     Review of systems:      Unable to obtain/Limited due to poor mental status    MEDICATIONS  (STANDING):  cefepime   IVPB 1000 milliGRAM(s) IV Intermittent every 12 hours  chlorhexidine 2% Cloths 1 Application(s) Topical <User Schedule>  heparin   Injectable 5000 Unit(s) SubCutaneous every 8 hours    MEDICATIONS  (PRN):      PHYSICAL EXAM:  Vital Signs Last 24 Hrs  T(C): 36.5 (06 Aug 2024 12:14), Max: 36.5 (06 Aug 2024 12:14)  T(F): 97.7 (06 Aug 2024 12:14), Max: 97.7 (06 Aug 2024 12:14)  HR: 75 (06 Aug 2024 11:51) (67 - 98)  BP: 152/73 (06 Aug 2024 11:51) (127/72 - 152/73)  BP(mean): --  RR: 16 (06 Aug 2024 11:51) (14 - 19)  SpO2: 100% (06 Aug 2024 11:51) (100% - 100%)    Parameters below as of 06 Aug 2024 11:51  Patient On (Oxygen Delivery Method): nasal cannula          Palliative Performance Scale/Karnofsky Score: 20      GENERAL: alert, cachectic, NAD  HEENT: Atraumatic, oropharynx clear, neck supple  CHEST/LUNG: unlabored  HEART: Regular rate and rhythm    ABDOMEN: Soft, Nontender, Nondistended   MUSCULOSKELETAL:  No  edema,  bedbound  NERVOUS SYSTEM:   alert, nonverbal, not following commands  SKIN: No rashes or lesions noted  Oral intake: npo    LABS:                          10.3   10.97 )-----------( 320      ( 06 Aug 2024 07:45 )             33.6     08-06    142  |  111<H>  |  36<H>  ----------------------------<  82  4.6   |  24  |  0.90    Ca    9.6      06 Aug 2024 07:45    TPro  6.5  /  Alb  2.2<L>  /  TBili  0.4  /  DBili  x   /  AST  14<L>  /  ALT  11<L>  /  AlkPhos  86  08-06    Urinalysis Basic - ( 06 Aug 2024 07:45 )    Color: x / Appearance: x / SG: x / pH: x  Gluc: 82 mg/dL / Ketone: x  / Bili: x / Urobili: x   Blood: x / Protein: x / Nitrite: x   Leuk Esterase: x / RBC: x / WBC x   Sq Epi: x / Non Sq Epi: x / Bacteria: x        RADIOLOGY & ADDITIONAL STUDIES:

## 2024-08-06 NOTE — SWALLOW BEDSIDE ASSESSMENT ADULT - COMMENTS
Pt approached Seated upright in bed, alert with confusion, speech unintelligible. Extensive oral care Provided prior to oral trials.

## 2024-08-06 NOTE — PROGRESS NOTE ADULT - SUBJECTIVE AND OBJECTIVE BOX
INTERVAL HPI/OVERNIGHT EVENTS:    CONTINUE  TO  AWAIT  SPEECH  AND  SWALLOW  EVALUATION    REVIEW OF SYSTEMS:  CONSTITUTIONAL:  alert comfortable poorly  communicative      MEDICATION:  cefepime   IVPB 1000 milliGRAM(s) IV Intermittent every 12 hours  chlorhexidine 2% Cloths 1 Application(s) Topical <User Schedule>  heparin   Injectable 5000 Unit(s) SubCutaneous every 8 hours    Vital Signs Last 24 Hrs  T(C): 36.3 (06 Aug 2024 05:51), Max: 36.4 (05 Aug 2024 13:11)  T(F): 97.4 (06 Aug 2024 05:51), Max: 97.6 (05 Aug 2024 13:11)  HR: 67 (06 Aug 2024 05:51) (62 - 98)  BP: 134/67 (06 Aug 2024 05:51) (127/72 - 143/85)  BP(mean): --  RR: 14 (06 Aug 2024 05:51) (14 - 19)  SpO2: 100% (06 Aug 2024 05:51) (100% - 100%)        PHYSICAL EXAM:  GENERAL: NAD, cachectic  HEENT : Conjuntivae  clear sclerae anicteric  NECK: Supple, No JVD, Normal thyroid  NERVOUS SYSTEM:  Alert poorly  communicative  moves  all extr  CHEST/LUNG: ronchis  rt  lower  lung  field   HEART: Regular rate and rhythm; No murmurs, rubs, or gallops  ABDOMEN: Soft, Nontender, Nondistended; Bowel sounds present  EXTREMITIES:  no  edema no  tenderness  SKIN: No rashes   LABS:                        10.8   9.94  )-----------( 291      ( 05 Aug 2024 06:35 )             34.7     08-05    140  |  108  |  34<H>  ----------------------------<  75  4.5   |  22  |  0.90    Ca    9.4      05 Aug 2024 06:35    TPro  6.5  /  Alb  2.2<L>  /  TBili  0.4  /  DBili  x   /  AST  14<L>  /  ALT  13  /  AlkPhos  91  08-05      Urinalysis Basic - ( 05 Aug 2024 06:35 )    Color: x / Appearance: x / SG: x / pH: x  Gluc: 75 mg/dL / Ketone: x  / Bili: x / Urobili: x   Blood: x / Protein: x / Nitrite: x   Leuk Esterase: x / RBC: x / WBC x   Sq Epi: x / Non Sq Epi: x / Bacteria: x      CAPILLARY BLOOD GLUCOSE          RADIOLOGY & ADDITIONAL TESTS:    Imaging reports  Personally Reviewed:  [ ] YES  [ ] NO    Consultant(s) Notes Reviewed:  [x ] YES  [ ] NO    Care Discussed with Consultants/Other Providers [ x] YES  [ ] NO  88 y/o M w/severe alzheimer's dementia, emphysema, and hx of prostate CA admitted for acute hypoxemic respiratory failure requiring intubation and hypotension likely secondary to severe sepsis with septic shock briefly requiring pressors in setting of presumed aspiration PNA. ARLENE likely ATN. Now awake, and off pressors.     # dysphagia  aspiration  pneumonia  dementia    - Complete course of abx  - DVT prophylaxis  awaiting   speech and swallow  evaluation have  spoken  with RN  staff  have  written repeat   order  forS&S  evaluation  palliative  care  eval appreciated    patient  continues  full code  family  to  review    Gt placement      # htn  controlled  will  Rx  with  IV  hydralzine  prn    # advanced  dementia  on  aricept  namenda   being  held  for  now  unclear  advantage  at  this  stage  of  dementia    # hx  prostate  ca s/p  prostatectomy 1992     artificial  urinary  sphincter  2017  on  Lupron q  3 months

## 2024-08-06 NOTE — SWALLOW BEDSIDE ASSESSMENT ADULT - SWALLOW EVAL: DIAGNOSIS
pt presented with overtly adequate oropharyngeal skills for puree and thin liquid via cup sip; adequate A-P transit and no signs of suspected aspiration. Attempted soft solids however pt unable to initiate oral stage resulting in removal of unmasticated bolus from oral cavity.

## 2024-08-07 LAB
ALBUMIN SERPL ELPH-MCNC: 2.3 G/DL — LOW (ref 3.3–5)
ALP SERPL-CCNC: 87 U/L — SIGNIFICANT CHANGE UP (ref 40–120)
ALT FLD-CCNC: 13 U/L — SIGNIFICANT CHANGE UP (ref 12–78)
ANION GAP SERPL CALC-SCNC: 4 MMOL/L — LOW (ref 5–17)
AST SERPL-CCNC: 12 U/L — LOW (ref 15–37)
BILIRUB SERPL-MCNC: 0.5 MG/DL — SIGNIFICANT CHANGE UP (ref 0.2–1.2)
BUN SERPL-MCNC: 36 MG/DL — HIGH (ref 7–23)
CALCIUM SERPL-MCNC: 9.6 MG/DL — SIGNIFICANT CHANGE UP (ref 8.5–10.1)
CHLORIDE SERPL-SCNC: 109 MMOL/L — HIGH (ref 96–108)
CO2 SERPL-SCNC: 27 MMOL/L — SIGNIFICANT CHANGE UP (ref 22–31)
CREAT SERPL-MCNC: 0.89 MG/DL — SIGNIFICANT CHANGE UP (ref 0.5–1.3)
CULTURE RESULTS: SIGNIFICANT CHANGE UP
CULTURE RESULTS: SIGNIFICANT CHANGE UP
EGFR: 83 ML/MIN/1.73M2 — SIGNIFICANT CHANGE UP
GLUCOSE SERPL-MCNC: 170 MG/DL — HIGH (ref 70–99)
HCT VFR BLD CALC: 36.1 % — LOW (ref 39–50)
HGB BLD-MCNC: 11 G/DL — LOW (ref 13–17)
MCHC RBC-ENTMCNC: 24.6 PG — LOW (ref 27–34)
MCHC RBC-ENTMCNC: 30.5 G/DL — LOW (ref 32–36)
MCV RBC AUTO: 80.6 FL — SIGNIFICANT CHANGE UP (ref 80–100)
NRBC # BLD: 0 /100 WBCS — SIGNIFICANT CHANGE UP (ref 0–0)
PLATELET # BLD AUTO: 364 K/UL — SIGNIFICANT CHANGE UP (ref 150–400)
POTASSIUM SERPL-MCNC: 4.3 MMOL/L — SIGNIFICANT CHANGE UP (ref 3.5–5.3)
POTASSIUM SERPL-SCNC: 4.3 MMOL/L — SIGNIFICANT CHANGE UP (ref 3.5–5.3)
PROT SERPL-MCNC: 6.8 GM/DL — SIGNIFICANT CHANGE UP (ref 6–8.3)
RBC # BLD: 4.48 M/UL — SIGNIFICANT CHANGE UP (ref 4.2–5.8)
RBC # FLD: 17 % — HIGH (ref 10.3–14.5)
SODIUM SERPL-SCNC: 140 MMOL/L — SIGNIFICANT CHANGE UP (ref 135–145)
SPECIMEN SOURCE: SIGNIFICANT CHANGE UP
SPECIMEN SOURCE: SIGNIFICANT CHANGE UP
WBC # BLD: 10.36 K/UL — SIGNIFICANT CHANGE UP (ref 3.8–10.5)
WBC # FLD AUTO: 10.36 K/UL — SIGNIFICANT CHANGE UP (ref 3.8–10.5)

## 2024-08-07 PROCEDURE — 71045 X-RAY EXAM CHEST 1 VIEW: CPT | Mod: 26

## 2024-08-07 RX ADMIN — CEFEPIME HYDROCHLORIDE 100 MILLIGRAM(S): 1 INJECTION, POWDER, FOR SOLUTION INTRAMUSCULAR; INTRAVENOUS at 18:33

## 2024-08-07 RX ADMIN — HEPARIN SODIUM 5000 UNIT(S): 1000 INJECTION, SOLUTION INTRAVENOUS; SUBCUTANEOUS at 14:45

## 2024-08-07 RX ADMIN — CHLORHEXIDINE GLUCONATE 1 APPLICATION(S): 500 CLOTH TOPICAL at 05:51

## 2024-08-07 RX ADMIN — HEPARIN SODIUM 5000 UNIT(S): 1000 INJECTION, SOLUTION INTRAVENOUS; SUBCUTANEOUS at 21:41

## 2024-08-07 RX ADMIN — HEPARIN SODIUM 5000 UNIT(S): 1000 INJECTION, SOLUTION INTRAVENOUS; SUBCUTANEOUS at 05:51

## 2024-08-07 RX ADMIN — CEFEPIME HYDROCHLORIDE 100 MILLIGRAM(S): 1 INJECTION, POWDER, FOR SOLUTION INTRAMUSCULAR; INTRAVENOUS at 05:50

## 2024-08-07 NOTE — PROGRESS NOTE ADULT - SUBJECTIVE AND OBJECTIVE BOX
INTERVAL HPI/OVERNIGHT EVENTS:    able  to  tolerate  puree  diet    REVIEW OF SYSTEMS:  CONSTITUTIONAL:  alert  comfortable poorly  communicative        MEDICATION:  cefepime   IVPB 1000 milliGRAM(s) IV Intermittent every 12 hours  chlorhexidine 2% Cloths 1 Application(s) Topical <User Schedule>  heparin   Injectable 5000 Unit(s) SubCutaneous every 8 hours    Vital Signs Last 24 Hrs  T(C): 36.3 (07 Aug 2024 06:33), Max: 36.6 (06 Aug 2024 17:09)  T(F): 97.4 (07 Aug 2024 06:33), Max: 97.8 (06 Aug 2024 17:09)  HR: 61 (07 Aug 2024 06:33) (61 - 89)  BP: 145/71 (07 Aug 2024 06:33) (138/79 - 154/75)  BP(mean): --  RR: 16 (07 Aug 2024 06:33) (16 - 19)  SpO2: 96% (07 Aug 2024 06:33) (96% - 100%)    Parameters below as of 06 Aug 2024 17:09  Patient On (Oxygen Delivery Method): nasal cannula        PHYSICAL EXAM:  GENERAL: NAD, well-groomed, well-developed  HEENT : Conjuntivae  clear sclerae anicteric  NECK: Supple, No JVD, Normal thyroid  NERVOUS SYSTEM:  Alert moves  all extr  CHEST/LUNG: clear    HEART: Regular rate and rhythm; No murmurs, rubs, or gallops  ABDOMEN: Soft, Nontender, Nondistended; Bowel sounds present  EXTREMITIES:  no  edema no  tenderness  SKIN: No rashes   LABS:                        10.3   10.97 )-----------( 320      ( 06 Aug 2024 07:45 )             33.6     08-06    142  |  111<H>  |  36<H>  ----------------------------<  82  4.6   |  24  |  0.90    Ca    9.6      06 Aug 2024 07:45    TPro  6.5  /  Alb  2.2<L>  /  TBili  0.4  /  DBili  x   /  AST  14<L>  /  ALT  11<L>  /  AlkPhos  86  08-06      Urinalysis Basic - ( 06 Aug 2024 07:45 )    Color: x / Appearance: x / SG: x / pH: x  Gluc: 82 mg/dL / Ketone: x  / Bili: x / Urobili: x   Blood: x / Protein: x / Nitrite: x   Leuk Esterase: x / RBC: x / WBC x   Sq Epi: x / Non Sq Epi: x / Bacteria: x      CAPILLARY BLOOD GLUCOSE          RADIOLOGY & ADDITIONAL TESTS:    Imaging reports  Personally Reviewed:  [ ] YES  [ ] NO    Consultant(s) Notes Reviewed:  [x ] YES  [ ] NO    Care Discussed with Consultants/Other Providers [ x] YES  [ ] NO  88 y/o M w/severe alzheimer's dementia, emphysema, and hx of prostate CA admitted for acute hypoxemic respiratory failure requiring intubation and hypotension likely secondary to severe sepsis with septic shock briefly requiring pressors in setting of presumed aspiration PNA. ARLENE likely ATN. Now awake, and off pressors.     # dysphagia  aspiration  pneumonia  dementia    - Complete course of abx  - DVT prophylaxis     speech and swallow  evaluation  appreciated  palliative  care  eval appreciated    patient  continues  full code  patient  at  continued  risk  of  future  aspiration  given  advancing  progeression with or  without  PEG   mild  leucocytosis  re check in  am  # htn  controlled off  medications    # advanced  dementia  on  aricept  namenda   will d/c   unclear  advantage  at  this  stage  of  dementia    # hx  prostate  ca s/p  prostatectomy 1992     artificial  urinary  sphincter  2017  hx  hld  will  d/c no  clear  indication  in  88 yo  patient  with a dvanced  dementia  on  Lupron q  3 months  discharge  back  ti  [facility] in  [place]  to  complete  10  day  course  of  cefepime    discussed  with  patient's  wife agrees  with  plan

## 2024-08-08 LAB
ANION GAP SERPL CALC-SCNC: 4 MMOL/L — LOW (ref 5–17)
BUN SERPL-MCNC: 39 MG/DL — HIGH (ref 7–23)
CALCIUM SERPL-MCNC: 9.9 MG/DL — SIGNIFICANT CHANGE UP (ref 8.5–10.1)
CHLORIDE SERPL-SCNC: 111 MMOL/L — HIGH (ref 96–108)
CO2 SERPL-SCNC: 24 MMOL/L — SIGNIFICANT CHANGE UP (ref 22–31)
CREAT SERPL-MCNC: 1.1 MG/DL — SIGNIFICANT CHANGE UP (ref 0.5–1.3)
EGFR: 65 ML/MIN/1.73M2 — SIGNIFICANT CHANGE UP
GLUCOSE SERPL-MCNC: 88 MG/DL — SIGNIFICANT CHANGE UP (ref 70–99)
HCT VFR BLD CALC: 32.8 % — LOW (ref 39–50)
HGB BLD-MCNC: 10.1 G/DL — LOW (ref 13–17)
MCHC RBC-ENTMCNC: 24.9 PG — LOW (ref 27–34)
MCHC RBC-ENTMCNC: 30.8 G/DL — LOW (ref 32–36)
MCV RBC AUTO: 81 FL — SIGNIFICANT CHANGE UP (ref 80–100)
NRBC # BLD: 0 /100 WBCS — SIGNIFICANT CHANGE UP (ref 0–0)
PLATELET # BLD AUTO: 321 K/UL — SIGNIFICANT CHANGE UP (ref 150–400)
POTASSIUM SERPL-MCNC: 5.5 MMOL/L — HIGH (ref 3.5–5.3)
POTASSIUM SERPL-SCNC: 5.5 MMOL/L — HIGH (ref 3.5–5.3)
RBC # BLD: 4.05 M/UL — LOW (ref 4.2–5.8)
RBC # FLD: 17 % — HIGH (ref 10.3–14.5)
SODIUM SERPL-SCNC: 139 MMOL/L — SIGNIFICANT CHANGE UP (ref 135–145)
WBC # BLD: 9.89 K/UL — SIGNIFICANT CHANGE UP (ref 3.8–10.5)
WBC # FLD AUTO: 9.89 K/UL — SIGNIFICANT CHANGE UP (ref 3.8–10.5)

## 2024-08-08 RX ADMIN — CHLORHEXIDINE GLUCONATE 1 APPLICATION(S): 500 CLOTH TOPICAL at 05:08

## 2024-08-08 RX ADMIN — CEFEPIME HYDROCHLORIDE 100 MILLIGRAM(S): 1 INJECTION, POWDER, FOR SOLUTION INTRAMUSCULAR; INTRAVENOUS at 18:42

## 2024-08-08 RX ADMIN — HEPARIN SODIUM 5000 UNIT(S): 1000 INJECTION, SOLUTION INTRAVENOUS; SUBCUTANEOUS at 18:42

## 2024-08-08 RX ADMIN — HEPARIN SODIUM 5000 UNIT(S): 1000 INJECTION, SOLUTION INTRAVENOUS; SUBCUTANEOUS at 05:08

## 2024-08-08 RX ADMIN — CEFEPIME HYDROCHLORIDE 100 MILLIGRAM(S): 1 INJECTION, POWDER, FOR SOLUTION INTRAMUSCULAR; INTRAVENOUS at 05:08

## 2024-08-08 NOTE — PROGRESS NOTE ADULT - SUBJECTIVE AND OBJECTIVE BOX
INTERVAL HPI/OVERNIGHT EVENTS:        REVIEW OF SYSTEMS:  CONSTITUTIONAL: alert comfortable  poorly  communicative    NECK: No pain or stiffnes  RESPIRATORY: No SOB   CARDIOVASCULAR: No chest pain, palpitations, dizziness,   GASTROINTESTINAL: No abdominal pain. No nausea, vomiting,   NEUROLOGICAL: No headaches, no  blurry  vision no  dizziness  SKIN: No itching,   MUSCULOSKELETAL: No pain    MEDICATION:  cefepime   IVPB 1000 milliGRAM(s) IV Intermittent every 12 hours  chlorhexidine 2% Cloths 1 Application(s) Topical <User Schedule>  heparin   Injectable 5000 Unit(s) SubCutaneous every 8 hours    Vital Signs Last 24 Hrs  T(C): 36.6 (08 Aug 2024 05:03), Max: 36.9 (07 Aug 2024 23:47)  T(F): 97.8 (08 Aug 2024 05:03), Max: 98.4 (07 Aug 2024 23:47)  HR: 55 (08 Aug 2024 05:03) (55 - 88)  BP: 125/55 (08 Aug 2024 05:03) (115/68 - 147/65)  BP(mean): --  RR: 20 (08 Aug 2024 05:03) (19 - 20)  SpO2: 100% (08 Aug 2024 05:03) (100% - 100%)        PHYSICAL EXAM:  GENERAL: NAD, well-groomed, well-developed  HEENT : Conjuntivae  clear sclerae anicteric  NECK: Supple, No JVD, Normal thyroid  NERVOUS SYSTEM:  Alert poorly  communicative   no  focal  deficits;   CHEST/LUNG: Clear    HEART: Regular rate and rhythm; No murmurs, rubs, or gallops  ABDOMEN: Soft, Nontender, Nondistended; Bowel sounds present  EXTREMITIES:  no  edema no  tenderness  SKIN: No rashes   LABS:                        10.1   9.89  )-----------( 321      ( 08 Aug 2024 06:40 )             32.8     08-08    139  |  111<H>  |  39<H>  ----------------------------<  88  5.5<H>   |  24  |  1.10    Ca    9.9      08 Aug 2024 06:40    TPro  6.8  /  Alb  2.3<L>  /  TBili  0.5  /  DBili  x   /  AST  12<L>  /  ALT  13  /  AlkPhos  87  08-07      Urinalysis Basic - ( 08 Aug 2024 06:40 )    Color: x / Appearance: x / SG: x / pH: x  Gluc: 88 mg/dL / Ketone: x  / Bili: x / Urobili: x   Blood: x / Protein: x / Nitrite: x   Leuk Esterase: x / RBC: x / WBC x   Sq Epi: x / Non Sq Epi: x / Bacteria: x      CAPILLARY BLOOD GLUCOSE          RADIOLOGY & ADDITIONAL TESTS:    Imaging reports  Personally Reviewed:  [ ] YES  [ ] NO    Consultant(s) Notes Reviewed:  [ ] YES  [ ] NO    Care Discussed with Consultants/Other Providers [x ] YES  [ ] NO  88 y/o M w/severe alzheimer's dementia, emphysema, and hx of prostate CA admitted for acute hypoxemic respiratory failure requiring intubation and hypotension likely secondary to severe sepsis with septic shock briefly requiring pressors in setting of presumed aspiration PNA. ARLENE likely ATN. Now awake, and off pressors.     # dysphagia  aspiration  pneumonia  dementia    - Complete course of abx  - DVT prophylaxis     speech and swallow  evaluation  appreciated  palliative  care  eval appreciated    patient  continues  full code  patient  at  continued  risk  of  future  aspiration  given  advancing  progeression with or  without  PEG   mild  leucocytosis  resolved   # htn  controlled off  medications    # advanced  dementia  on  aricept  namenda   will d/c   unclear  advantage  at  this  stage  of  dementia    # hx  prostate  ca s/p  prostatectomy 1992     artificial  urinary  sphincter  2017  hx  hld  will  d/c no  clear  indication  in  89 yo  patient  with a dvanced  dementia  on  Lupron q  3 months  discharge  back  to  [facility]  to  complete  10  day  course  of  cefepime    discussed  with  patient's  wife agrees  with  plan

## 2024-08-09 ENCOUNTER — TRANSCRIPTION ENCOUNTER (OUTPATIENT)
Age: 89
End: 2024-08-09

## 2024-08-09 VITALS
HEART RATE: 100 BPM | DIASTOLIC BLOOD PRESSURE: 61 MMHG | SYSTOLIC BLOOD PRESSURE: 118 MMHG | TEMPERATURE: 98 F | OXYGEN SATURATION: 94 % | RESPIRATION RATE: 17 BRPM

## 2024-08-09 LAB
ANION GAP SERPL CALC-SCNC: 3 MMOL/L — LOW (ref 5–17)
BUN SERPL-MCNC: 36 MG/DL — HIGH (ref 7–23)
CALCIUM SERPL-MCNC: 9.6 MG/DL — SIGNIFICANT CHANGE UP (ref 8.5–10.1)
CHLORIDE SERPL-SCNC: 110 MMOL/L — HIGH (ref 96–108)
CO2 SERPL-SCNC: 29 MMOL/L — SIGNIFICANT CHANGE UP (ref 22–31)
CREAT SERPL-MCNC: 0.84 MG/DL — SIGNIFICANT CHANGE UP (ref 0.5–1.3)
EGFR: 84 ML/MIN/1.73M2 — SIGNIFICANT CHANGE UP
GLUCOSE SERPL-MCNC: 113 MG/DL — HIGH (ref 70–99)
HCT VFR BLD CALC: 33.7 % — LOW (ref 39–50)
HGB BLD-MCNC: 10.4 G/DL — LOW (ref 13–17)
MCHC RBC-ENTMCNC: 25 PG — LOW (ref 27–34)
MCHC RBC-ENTMCNC: 30.9 G/DL — LOW (ref 32–36)
MCV RBC AUTO: 81 FL — SIGNIFICANT CHANGE UP (ref 80–100)
NRBC # BLD: 0 /100 WBCS — SIGNIFICANT CHANGE UP (ref 0–0)
PLATELET # BLD AUTO: 349 K/UL — SIGNIFICANT CHANGE UP (ref 150–400)
POTASSIUM SERPL-MCNC: 4.2 MMOL/L — SIGNIFICANT CHANGE UP (ref 3.5–5.3)
POTASSIUM SERPL-SCNC: 4.2 MMOL/L — SIGNIFICANT CHANGE UP (ref 3.5–5.3)
RBC # BLD: 4.16 M/UL — LOW (ref 4.2–5.8)
RBC # FLD: 16.9 % — HIGH (ref 10.3–14.5)
SODIUM SERPL-SCNC: 142 MMOL/L — SIGNIFICANT CHANGE UP (ref 135–145)
WBC # BLD: 9.16 K/UL — SIGNIFICANT CHANGE UP (ref 3.8–10.5)
WBC # FLD AUTO: 9.16 K/UL — SIGNIFICANT CHANGE UP (ref 3.8–10.5)

## 2024-08-09 PROCEDURE — 99497 ADVNCD CARE PLAN 30 MIN: CPT | Mod: 25

## 2024-08-09 PROCEDURE — 99232 SBSQ HOSP IP/OBS MODERATE 35: CPT

## 2024-08-09 RX ORDER — CEFEPIME HYDROCHLORIDE 1 G/1
1 INJECTION, POWDER, FOR SOLUTION INTRAMUSCULAR; INTRAVENOUS
Qty: 0 | Refills: 0 | DISCHARGE
Start: 2024-08-09 | End: 2024-08-12

## 2024-08-09 RX ADMIN — CEFEPIME HYDROCHLORIDE 100 MILLIGRAM(S): 1 INJECTION, POWDER, FOR SOLUTION INTRAMUSCULAR; INTRAVENOUS at 06:03

## 2024-08-09 RX ADMIN — CEFEPIME HYDROCHLORIDE 100 MILLIGRAM(S): 1 INJECTION, POWDER, FOR SOLUTION INTRAMUSCULAR; INTRAVENOUS at 17:10

## 2024-08-09 RX ADMIN — HEPARIN SODIUM 5000 UNIT(S): 1000 INJECTION, SOLUTION INTRAVENOUS; SUBCUTANEOUS at 06:03

## 2024-08-09 RX ADMIN — HEPARIN SODIUM 5000 UNIT(S): 1000 INJECTION, SOLUTION INTRAVENOUS; SUBCUTANEOUS at 14:30

## 2024-08-09 RX ADMIN — CHLORHEXIDINE GLUCONATE 1 APPLICATION(S): 500 CLOTH TOPICAL at 06:03

## 2024-08-09 NOTE — PROGRESS NOTE ADULT - TIME BILLING
Time spent for extensive review of the physical chart, electronic health record, and documentation to obtain collateral information including but not limited to:    - Current inpatient records (ED, H&P, primary team, and consultants if applicable)   - Inpatient values/results (biomarkers, immunoassays, imaging, and microbiology results)   - Current or proposed treatment plans   - Pharmacotherapy review   Time spent for counseling and education with patient/family  Time spent discussing and coordinating care with primary team and interdisciplinary staff and floor staff.
Time spent for extensive review of the physical chart, electronic health record, and documentation to obtain collateral information including but not limited to:    - Current inpatient records (ED, H&P, primary team, and consultants if applicable)   - Inpatient values/results (biomarkers, immunoassays, imaging, and microbiology results)   - Current or proposed treatment plans   - Pharmacotherapy review   Time spent for counseling and education with patient/family  Time spent discussing and coordinating care with primary team and interdisciplinary staff and floor staff.

## 2024-08-09 NOTE — PROGRESS NOTE ADULT - REASON FOR ADMISSION
hypotension, sepsis

## 2024-08-09 NOTE — DISCHARGE NOTE NURSING/CASE MANAGEMENT/SOCIAL WORK - NSDCPEFALRISK_GEN_ALL_CORE
For information on Fall & Injury Prevention, visit: [email or web address] OR  [email or web address] OR  https://www.cdc.gov/steadi/patient.html

## 2024-08-09 NOTE — DISCHARGE NOTE PROVIDER - NSDCMRMEDTOKEN_GEN_ALL_CORE_FT
amLODIPine 2.5 mg oral tablet: 1 tab(s) orally once a day  aspirin 81 mg oral tablet, chewable: 1 tab(s) orally once a day  atorvastatin 40 mg oral tablet: 1 tab(s) orally once a day (at bedtime)  cefepime 1 g intravenous injection: 1 gram(s) intravenous every 12 hours 1000 mg in dextrose 5% 50 mL, IV intermittent, every 12 hours, infuse over 30 minutes, end date: 8/12/24  donepezil 5 mg oral tablet: 3 tab(s) orally once a day (at bedtime)  memantine 10 mg oral tablet: 1 tab(s) orally once a day  QUEtiapine 25 mg oral tablet: 1 tab(s) orally once a day (at bedtime)   cefepime 1 g intravenous injection: 1 gram(s) intravenous every 12 hours 1000 mg in dextrose 5% 50 mL, IV intermittent, every 12 hours, infuse over 30 minutes, end date: 8/12/24

## 2024-08-09 NOTE — PROGRESS NOTE ADULT - SUBJECTIVE AND OBJECTIVE BOX
follow up on:  complex medical decision making related to goals of care    OVERNIGHT EVENTS:  no acute events overnight  pt awake, NV, appears comfortable  Wife at bedside     Review of systems:     Pain:  [ ] yes [x ] no  QOL impact -   Location -                    Aggravating factors -  Quality -  Radiation -  Timing-  Severity (0-10 scale):  Minimal acceptable level (0-10 scale):     All other systems reviewed and negative    MEDICATIONS  (STANDING):  cefepime   IVPB 1000 milliGRAM(s) IV Intermittent every 12 hours  chlorhexidine 2% Cloths 1 Application(s) Topical <User Schedule>  heparin   Injectable 5000 Unit(s) SubCutaneous every 8 hours    MEDICATIONS  (PRN):    PHYSICAL EXAM:  Vital Signs Last 24 Hrs  T(C): 36.9 (09 Aug 2024 11:06), Max: 37.2 (08 Aug 2024 17:13)  T(F): 98.4 (09 Aug 2024 11:06), Max: 98.9 (08 Aug 2024 17:13)  HR: 76 (09 Aug 2024 11:06) (72 - 92)  BP: 105/60 (09 Aug 2024 11:06) (105/60 - 135/78)  BP(mean): --  RR: 16 (09 Aug 2024 11:06) (16 - 19)  SpO2: 99% (09 Aug 2024 11:06) (98% - 100%)    Parameters below as of 09 Aug 2024 11:06  Patient On (Oxygen Delivery Method): nasal cannula  O2 Flow (L/min): 3        Palliative Performance Scale/Karnofsky Score:  ECOG Performance:     GENERAL: elderly male in bed in NAD   HEENT: Atraumatic, oropharynx clear, neck supple  LUNGs: unlabored, decreased B/L bases   HEART: Regular rate and rhythm    ABDOMEN: Soft, Nontender, Nondistended, normoactive BS   MUSCULOSKELETAL:  No  edema ,or clubbing   NERVOUS SYSTEM:  Alert , NV, tracks with eyes and expressions appear that he understands, attempts to  follow commands  SKIN: No rashes or lesions noted  Oral intake: consumed ~ 25% of his lunch    LABS:                          10.4   9.16  )-----------( 349      ( 09 Aug 2024 07:45 )             33.7     08-09    142  |  110<H>  |  36<H>  ----------------------------<  113<H>  4.2   |  29  |  0.84    Ca    9.6      09 Aug 2024 07:45      Urinalysis Basic - ( 09 Aug 2024 07:45 )    Color: x / Appearance: x / SG: x / pH: x  Gluc: 113 mg/dL / Ketone: x  / Bili: x / Urobili: x   Blood: x / Protein: x / Nitrite: x   Leuk Esterase: x / RBC: x / WBC x   Sq Epi: x / Non Sq Epi: x / Bacteria: x    RADIOLOGY & ADDITIONAL STUDIES: reviewed

## 2024-08-09 NOTE — PROGRESS NOTE ADULT - PROBLEM SELECTOR PLAN 1
adm w septic shock, acute hypoxic resp failure req intubation, pressor support, 2/2 asp PNA, on antibiotics, extubated 8/2, downgraded from ICU 8/3. O2 stable on NC.
adm w septic shock, acute hypoxic resp failure req intubation, pressor support, 2/2 asp PNA, on antibiotics, extubated 8/2, downgraded from ICU 8/3. O2 stable on NC.

## 2024-08-09 NOTE — DISCHARGE NOTE PROVIDER - CARE PROVIDER_API CALL
Greg Floyd  Internal Medicine  [address] [place] [id number]  Phone: [phone]  Fax: [phone]  Follow Up Time:

## 2024-08-09 NOTE — PROGRESS NOTE ADULT - PROVIDER SPECIALTY LIST ADULT
Critical Care
Internal Medicine
Palliative Care
Critical Care
Internal Medicine
Internal Medicine
Critical Care
Palliative Care

## 2024-08-09 NOTE — PROGRESS NOTE ADULT - PROBLEM SELECTOR PLAN 2
advanced, min verbal, WCbound at baseline per wife. hospice eligible depending on goals, wife does not appear ready.
advanced, min verbal, WC bound at baseline per wife. hospice eligible depending on goals, wife does not appear ready.

## 2024-08-09 NOTE — DISCHARGE NOTE PROVIDER - NSDCCPCAREPLAN_GEN_ALL_CORE_FT
PRINCIPAL DISCHARGE DIAGNOSIS  Diagnosis: Acute respiratory failure with hypoxia  Assessment and Plan of Treatment:       SECONDARY DISCHARGE DIAGNOSES  Diagnosis: Septic shock  Assessment and Plan of Treatment:     Diagnosis: Pneumonia, aspiration  Assessment and Plan of Treatment:     Diagnosis: Dementia  Assessment and Plan of Treatment:

## 2024-08-09 NOTE — PROGRESS NOTE ADULT - SUBJECTIVE AND OBJECTIVE BOX
INTERVAL HPI/OVERNIGHT EVENTS:        REVIEW OF SYSTEMS:  CONSTITUTIONAL:  patient  reported with  poor  oral  intake  alert    poorly  communicative      MEDICATION:  cefepime   IVPB 1000 milliGRAM(s) IV Intermittent every 12 hours  chlorhexidine 2% Cloths 1 Application(s) Topical <User Schedule>  heparin   Injectable 5000 Unit(s) SubCutaneous every 8 hours    Vital Signs Last 24 Hrs  T(C): 36.4 (09 Aug 2024 05:41), Max: 37.2 (08 Aug 2024 17:13)  T(F): 97.5 (09 Aug 2024 05:41), Max: 98.9 (08 Aug 2024 17:13)  HR: 77 (09 Aug 2024 05:41) (58 - 92)  BP: 135/78 (09 Aug 2024 05:41) (115/66 - 136/69)  BP(mean): --  RR: 16 (09 Aug 2024 05:41) (16 - 19)  SpO2: 100% (09 Aug 2024 05:41) (98% - 100%)    Parameters below as of 08 Aug 2024 11:38  Patient On (Oxygen Delivery Method): nasal cannula  O2 Flow (L/min): 3      PHYSICAL EXAM:  GENERAL: NAD,cachectic  HEENT : Conjuntivae  clear sclerae anicteric  NECK: Supple, No JVD, Normal thyroid  NERVOUS SYSTEM:  Alert poorly  communicative moves  all  extr  CHEST/LUNG: Clear    HEART: Regular rate and rhythm; No murmurs, rubs, or gallops  ABDOMEN: Soft, Nontender, Nondistended; Bowel sounds present  EXTREMITIES:  no  edema no  tenderness  SKIN: No rashes   LABS:                        10.4   9.16  )-----------( 349      ( 09 Aug 2024 07:45 )             33.7     08-09    142  |  110<H>  |  36<H>  ----------------------------<  113<H>  4.2   |  29  |  0.84    Ca    9.6      09 Aug 2024 07:45    TPro  6.8  /  Alb  2.3<L>  /  TBili  0.5  /  DBili  x   /  AST  12<L>  /  ALT  13  /  AlkPhos  87  08-07      Urinalysis Basic - ( 09 Aug 2024 07:45 )    Color: x / Appearance: x / SG: x / pH: x  Gluc: 113 mg/dL / Ketone: x  / Bili: x / Urobili: x   Blood: x / Protein: x / Nitrite: x   Leuk Esterase: x / RBC: x / WBC x   Sq Epi: x / Non Sq Epi: x / Bacteria: x      CAPILLARY BLOOD GLUCOSE          RADIOLOGY & ADDITIONAL TESTS:    Imaging reports  Personally Reviewed:  [ ] YES  [ ] NO    Consultant(s) Notes Reviewed:  [ ] YES  [ ] NO    Care Discussed with Consultants/Other Providers [x ] YES  [ ] NO    89 y/o M w/severe alzheimer's dementia, emphysema, and hx of prostate CA admitted for acute hypoxemic respiratory failure requiring intubation and hypotension likely secondary to severe sepsis with septic shock briefly requiring pressors in setting of presumed aspiration PNA. ARLENE likely ATN. Now awake, and off pressors.     # dysphagia  aspiration  pneumonia  dementia    - Complete course of abx  - DVT prophylaxis     speech and swallow  evaluation  appreciated  palliative  care  eval appreciated    patient  continues  full code  patient  at  continued  risk  of  future  aspiration  given  advancing  progeression with or  without  PEG   mild  leucocytosis  resolved   # htn  controlled off  medications    # advanced  dementia  on  aricept  namenda   will d/c   unclear  advantage  at  this  stage  of  dementia    # hx  prostate  ca s/p  prostatectomy 1992     artificial  urinary  sphincter  2017  hx  hld  will  d/c no  clear  indication  in  89 yo  patient  with a dvanced  dementia  on  Lupron q  3 months  discharge  back  to  [facility]  to  complete  10  day  course  of  cefepime    discussed  with  patient's  wife No  feeding  tube  agrees  to  comfort  care

## 2024-08-09 NOTE — DISCHARGE NOTE PROVIDER - HOSPITAL COURSE
89 y/o M w/ severe alzheimer's dementia, emphysema, and hx of prostate CA admitted for acute hypoxemic respiratory failure requiring intubation and hypotension likely secondary to severe sepsis with septic shock briefly requiring pressors in setting of presumed aspiration PNA. ARLENE likely ATN. Now awake, and off pressors.     # dysphagia  aspiration  pneumonia  dementia    - Complete course of abx  - DVT prophylaxis     speech and swallow  evaluation  appreciated  palliative  care  eval appreciated    patient  continues  full code  patient  at  continued  risk  of  future  aspiration  given  advancing  progeression with or  without  PEG   mild  leucocytosis  resolved   # htn  controlled off  medications    # advanced  dementia  on  aricept  namenda   will d/c   unclear  advantage  at  this  stage  of  dementia    # hx  prostate  ca s/p  prostatectomy 1992     artificial  urinary  sphincter  2017  hx  hld  will  d/c no  clear  indication  in  88 yo  patient  with a dvanced  dementia  on  Lupron q  3 months  discharge  back  to  [facility]  to  complete  10  day  course  of  cefepime    discussed  with  patient's  wife No  feeding  tube  agrees  to  comfort  care   88 y/o M w/ severe alzheimer's dementia, emphysema, and hx of prostate CA admitted for acute hypoxemic respiratory failure requiring intubation and hypotension likely secondary to severe sepsis with septic shock briefly requiring pressors in setting of presumed aspiration PNA. ARLENE likely ATN. Now awake, extubated and off pressors.     1) Dysphagia  aspiration  pneumonia  - Continue IV Cefepime until 8/12/24  - Patient  at  continued  risk  of  future  aspiration  given  advancing  progression with or without  PEG     2) HTN  -  Controlled off medications    3) Advanced  dementia    - On  Aricept and Namenda, will d/c as unclear advantage  at  this  stage  of  dementia    4) Hx  of prostate  ca s/p  prostatectomy 1992, artificial  urinary  sphincter  2017  - On  Lupron q 3 months    5) HLD  - Will  d/c medication, no  clear  indication  in  88 yo  patient  with advanced dementia      - Discharge  back  to  [facility]  to  complete  10  day  course  of  Cefepime on 8/12/24   - Discussed  with  patient's wife- No  feeding  tube, agrees  to  comfort  care, signed MOLST with DNR/DNI  - Patient is medically cleared for discharge, plan discussed with Dr. Floyd, agrees with plan   89 y/o M w/ severe alzheimer's dementia, emphysema, and hx of prostate CA admitted for acute hypoxemic respiratory failure requiring intubation and hypotension likely secondary to severe sepsis with septic shock briefly requiring pressors in setting of presumed aspiration PNA. ARLENE likely ATN. Now awake, extubated and off pressors.     1) Dysphagia  aspiration  pneumonia  - Continue IV Cefepime until 8/12/24  - Patient  at  continued  risk  of  future  aspiration  given  advancing  progression    2) HTN  -  Controlled off medications    3) Advanced  dementia    - On  Aricept and Namenda, will d/c as unclear advantage  at  this  stage  of  dementia    4) Hx  of prostate  ca s/p  prostatectomy 1992, artificial  urinary  sphincter  2017  - On  Lupron q 3 months    5) HLD  - Will  d/c medication, no  clear  indication  in  89 yo  patient  with advanced dementia      - Discharge  back  to  [facility]  to  complete  10  day  course  of  Cefepime on 8/12/24   - Discussed  with  patient's wife- No  feeding  tube, agrees  to  comfort  care, signed MOLST with DNR/DNI  - Patient is medically cleared for discharge, plan discussed with Dr. Floyd, agrees with plan   88 y/o M w/ severe alzheimer's dementia, emphysema, and hx of prostate CA admitted for acute hypoxemic respiratory failure requiring intubation and hypotension likely secondary to severe sepsis with septic shock briefly requiring pressors in setting of presumed aspiration PNA. ARLENE likely ATN. Now awake, extubated and off pressors.     1) Dysphagia  aspiration  pneumonia  - Continue IV Cefepime until 8/12/24  - Patient  at  continued  risk  of  future  aspiration  given  advancing  progression of dementia    2) HTN  -  Controlled off medications    3) Advanced  dementia    - On  Aricept and Namenda, will d/c as unclear advantage  at  this  stage  of  dementia    4) Hx  of prostate  ca s/p  prostatectomy 1992, artificial  urinary  sphincter  2017  - On  Lupron q 3 months    5) HLD  - Will  d/c medication, no  clear  indication  in  88 yo  patient  with advanced dementia      - Discharge  back  to  [facility]  to  complete  10  day  course  of  Cefepime on 8/12/24   - Discussed  with  patient's wife- No  feeding  tube, agrees  to  comfort  care, signed MOLST with DNR/DNI  - Patient is medically cleared for discharge, plan discussed with Dr. Floyd, agrees with plan   89 y/o M w/ severe alzheimer's dementia, emphysema, and hx of prostate CA admitted for acute hypoxemic respiratory failure requiring intubation and hypotension likely secondary to severe sepsis with septic shock briefly requiring pressors in setting of presumed aspiration PNA. ARLENE likely ATN. Now awake, extubated and off pressors.     1) Dysphagia  aspiration  pneumonia  - Continue IV Cefepime until 8/12/24  - Patient  at  continued  risk  of  future  aspiration  given  advancing  progression of dementia  - Patient currently on 3L NC    2) HTN  -  Controlled off medications    3) Advanced  dementia    - On  Aricept and Namenda, will d/c as unclear advantage  at  this  stage  of  dementia    4) Hx  of prostate  ca s/p  prostatectomy 1992, artificial  urinary  sphincter  2017  - On  Lupron q 3 months    5) HLD  - Will  d/c medication, no  clear  indication  in  88 yo  patient  with advanced dementia      - Discharge  back  to  [facility]  to  complete  10  day  course  of  Cefepime on 8/12/24   - Discussed  with  patient's wife- No  feeding  tube, agrees  to  comfort  care, signed MOLST with DNR/DNI  - Patient is medically cleared for discharge, plan discussed with Dr. Floyd, agrees with plan

## 2024-08-09 NOTE — PROGRESS NOTE ADULT - TREATMENT GUIDELINES
DNR/Intubation trial
DNR/Do not re-hospitalize/No blood draws/No artificial nutrition/Antibiotic trial/DNI

## 2024-08-09 NOTE — PROGRESS NOTE ADULT - CONVERSATION DETAILS
Spoke w wife over phone today regarding further GOC. She stated that she spoke w her stepdaughters and she is in agreement for DNR, does not want CPR if his heart stops, however would want a trial of intubation if necessary and see how he does. She wishes to continue medical treatments that might help him, undecided about feeding tubes, wishes to see how he does w swallow evaluation. Provided anticipatory guidance regarding hospice philosophy/services, she is not ready but may consider in the future if he continues to decline. Support provided.    ASHLEE drafted and placed in chart
Met with pt and his wife today at request of medical team. Pt alert, unable to participate in conversation 2/2 AMS/NV, but did appear to be listening and offered a nod periodically    Wife states that she had spoken with Dr Floyd who recommended that she meet agin with palliative to [facility] Pt wife is now agreeable for pt to return to [facility]. As he is medically ready, he will likely be discharged back today or tomorrow. Pt wife states that she had spoken with pt children and are now amenable to focusing more on pt QOL and comfort. She acknowledges that intubation would likely cause more harm than good in this frail elderly man with the unlikelihood that he would be able to come of ventilatory support. She is now agreeable to DNR/DNI. Pt has been tolerating a puree diet, observed 25% of lunch tray eaten CNA reports some coughing. Discussed comfort feeds with strict aspiration precautions  which are in line with her [facility] to maintain comfort and dignity. Discussed benefits vs burdens of ABT use in EOL. She wishes for  to be evaluated again for ABT use should he develop such. She is asking that pt continue to be allowed to sit in the day room as tolerated.     MOLST updated to reflect these wishes. Original given to Mrs Cruz, copy to be sent to [facility]    Dr Floyd notified on Teams

## 2024-08-09 NOTE — CHART NOTE - NSCHARTNOTEFT_GEN_A_CORE
Pt seen on medical floor, adm w/ hypotension ; sepsis. Pt w/ poor p.o. intake & poorly communicative. Pt w/ dysphagia aspiration pneumonia ; advanced dementia; hx prostate Ca. BMI on adm < 19.     Factors impacting intake: [ ] none [ ] nausea  [ ] vomiting [ ] diarrhea [ ] constipation  [ ]chewing problems [ ] swallowing issues  [x ] other: persistent decreased p.o intake    Diet Prescription: Diet, Pureed:   Moderately Thick Liquids (MODTHICKLIQS) (08-07-24 @ 15:38)    Intake: < 50 % meals.     Current Weight: 8/9 - 100.3 (45.5 kg) 8/1 - 102.5 (46.4 kg)  % Weight Change. .2 % / .9 kg loss x 7days      Pertinent Medications: MEDICATIONS  (STANDING):  cefepime   IVPB 1000 milliGRAM(s) IV Intermittent every 12 hours  chlorhexidine 2% Cloths 1 Application(s) Topical <User Schedule>  heparin   Injectable 5000 Unit(s) SubCutaneous every 8 hours    MEDICATIONS  (PRN):    Pertinent Labs: 08-09 Na142 mmol/L Glu 113 mg/dL<H> K+ 4.2 mmol/L Cr  0.84 mg/dL BUN 36 mg/dL<H> 08-04 Phos 2.8 mg/dL 08-07 Alb 2.3 g/dL<L>08-07 ALT 13 U/L AST 12 U/L<L> Alkaline Phosphatase 87 U/L     CAPILLARY BLOOD GLUCOSE        Skin: P/U X 6    Estimated Needs:   [x ] no change since previous assessment (8/2 )  [ ] recalculated:     Previous Nutrition Diagnosis:   Malnutrition	severe malnutrition in context of chronic illness  Etiology	inadequate protein-energy intake in setting of history of prostate cancer, alzheimer's dementia, emphysema, dysphagia, debility, pressure ulcers  Signs/Symptoms	physical findings of severe fat/muscle loss  Goal/Expected Outcome	pt to meet >75% protein-energy needs via tolerated route - not met    Nutrition Diagnosis is [x ] ongoing  [ ] resolved [ ] not applicable     New Nutrition Diagnosis: [ x not applicable       Interventions:   Recommend  [ ] Change Diet To:  [ ] Nutrition Supplement  [ ] Nutrition Support  [ ] Other:     Monitoring and Evaluation:   [ x] PO intake [ x ] Tolerance to diet prescription [ x ] weights [ x ] labs[ x ] follow up per protocol  [ ] other:

## 2024-08-09 NOTE — PROGRESS NOTE ADULT - PROBLEM SELECTOR PLAN 5
Wife is surrogate, patient also has 2 daughters from a previous marriage.  Hospice eligible however wife not ready, wishes to continue w medical management and rehospitalization as needed. Now DNR/trial of intubation, remains undecided about feeding tubes, wishes to see how he does w speech eval. Palliative will follow.
Wife is surrogate, patient also has 2 daughters from a previous marriage.  Pt to return to [facility] shortly . Now DNR/DNI/DNH, please see note above for updated goals MOLST updated

## 2024-08-09 NOTE — PROGRESS NOTE ADULT - NUTRITIONAL ASSESSMENT
This patient has been assessed with a concern for Malnutrition and has been determined to have a diagnosis/diagnoses of Severe protein-calorie malnutrition and Underweight (BMI < 19).    This patient is being managed with:   Diet NPO-  Except Medications     Special Instructions for Nursing:  Except Medications  Entered: Aug  1 2024  5:16PM  
This patient has been assessed with a concern for Malnutrition and has been determined to have a diagnosis/diagnoses of Severe protein-calorie malnutrition and Underweight (BMI < 19).    This patient is being managed with:   Diet Pureed-  Entered: Aug  6 2024  6:45PM  
This patient has been assessed with a concern for Malnutrition and has been determined to have a diagnosis/diagnoses of Severe protein-calorie malnutrition and Underweight (BMI < 19).    This patient is being managed with:   Diet Pureed-  Moderately Thick Liquids (MODTHICKLIQS)  Entered: Aug  7 2024  3:37PM  
This patient has been assessed with a concern for Malnutrition and has been determined to have a diagnosis/diagnoses of Severe protein-calorie malnutrition and Underweight (BMI < 19).    This patient is being managed with:   Diet NPO-  Except Medications     Special Instructions for Nursing:  Except Medications  Entered: Aug  1 2024  5:16PM  
This patient has been assessed with a concern for Malnutrition and has been determined to have a diagnosis/diagnoses of Severe protein-calorie malnutrition and Underweight (BMI < 19).    This patient is being managed with:   Diet Pureed-  Moderately Thick Liquids (MODTHICKLIQS)  Entered: Aug  7 2024  3:37PM

## 2024-08-09 NOTE — PROGRESS NOTE ADULT - PROBLEM SELECTOR PLAN 4
2/2 advanced dementia, prostate CA, comorbidities, dependent for care, NH resident. Freq positioning, off loading.
2/2 advanced dementia, prostate CA, comorbidities, dependent for care, NH resident. Freq positioning, off loading.
Opioid withdrawal

## 2024-08-09 NOTE — DISCHARGE NOTE NURSING/CASE MANAGEMENT/SOCIAL WORK - PATIENT PORTAL LINK FT
You can access the FollowMyHealth Patient Portal offered by [facility] by registering at the following website: [email or web address]. By joining [id number]’s FollowMyHealth portal, you will also be able to view your health information using other applications (apps) compatible with our system.

## 2024-08-09 NOTE — DISCHARGE NOTE PROVIDER - DETAILS OF MALNUTRITION DIAGNOSIS/DIAGNOSES
This patient has been assessed with a concern for Malnutrition and was treated during this hospitalization for the following Nutrition diagnosis/diagnoses:     -  08/02/2024: Severe protein-calorie malnutrition   -  08/02/2024: Underweight (BMI < 19)

## 2024-08-09 NOTE — PROGRESS NOTE ADULT - PROBLEM SELECTOR PLAN 3
Clinical evidence indicates that the patient has Severe protein calorie malnutrition/ 3rd degree    In context of      Chronic Illness (>1 month)    Energy/Food intake <50% of estimated energy requirement >5 days  Weight loss: Moderate - severe (lbs lost recently)  Body Fat loss: Severe   (Cachexia, temporal wasting,  muscle atrophy)  Muscle mass loss: Severe  (Skin failure/pressure ulcers)    Strength: weakened severe (bedbound)    Recommend:   aspiration precautions  nutrition consult  SLP eval pending  Discussed risks/benefits of artificial nutrition/PEG vs comfort feeds, wife to consider, leaning toward feeding tube if needed.
Clinical evidence indicates that the patient has Severe protein calorie malnutrition/ 3rd degree    In context of      Chronic Illness (>1 month)    Energy/Food intake <50% of estimated energy requirement >5 days  Weight loss: Moderate - severe (lbs lost recently)  Body Fat loss: Severe   (Cachexia, temporal wasting,  muscle atrophy)  Muscle mass loss: Severe  (Skin failure/pressure ulcers)    Strength: weakened severe (bedbound)    Recommend:   aspiration precautions  nutrition consult  SLP eval pending  Discussed risks/benefits of artificial nutrition/PEG vs comfort feeds, wife to consider, leaning toward feeding tube if needed.

## 2024-08-20 DIAGNOSIS — Z85.46 PERSONAL HISTORY OF MALIGNANT NEOPLASM OF PROSTATE: ICD-10-CM

## 2024-08-20 DIAGNOSIS — E43 UNSPECIFIED SEVERE PROTEIN-CALORIE MALNUTRITION: ICD-10-CM

## 2024-08-20 DIAGNOSIS — Z96.652 PRESENCE OF LEFT ARTIFICIAL KNEE JOINT: ICD-10-CM

## 2024-08-20 DIAGNOSIS — I10 ESSENTIAL (PRIMARY) HYPERTENSION: ICD-10-CM

## 2024-08-20 DIAGNOSIS — R53.2 FUNCTIONAL QUADRIPLEGIA: ICD-10-CM

## 2024-08-20 DIAGNOSIS — A41.9 SEPSIS, UNSPECIFIED ORGANISM: ICD-10-CM

## 2024-08-20 DIAGNOSIS — R65.21 SEVERE SEPSIS WITH SEPTIC SHOCK: ICD-10-CM

## 2024-08-20 DIAGNOSIS — Z90.79 ACQUIRED ABSENCE OF OTHER GENITAL ORGAN(S): ICD-10-CM

## 2024-08-20 DIAGNOSIS — R64 CACHEXIA: ICD-10-CM

## 2024-08-20 DIAGNOSIS — H91.90 UNSPECIFIED HEARING LOSS, UNSPECIFIED EAR: ICD-10-CM

## 2024-08-20 DIAGNOSIS — J96.01 ACUTE RESPIRATORY FAILURE WITH HYPOXIA: ICD-10-CM

## 2024-08-20 DIAGNOSIS — G30.9 ALZHEIMER'S DISEASE, UNSPECIFIED: ICD-10-CM

## 2024-08-20 DIAGNOSIS — N17.0 ACUTE KIDNEY FAILURE WITH TUBULAR NECROSIS: ICD-10-CM

## 2024-08-20 DIAGNOSIS — L89.91 PRESSURE ULCER OF UNSPECIFIED SITE, STAGE 1: ICD-10-CM

## 2024-08-20 DIAGNOSIS — Z11.52 ENCOUNTER FOR SCREENING FOR COVID-19: ICD-10-CM

## 2024-08-20 DIAGNOSIS — Z91.81 HISTORY OF FALLING: ICD-10-CM

## 2024-08-20 DIAGNOSIS — Z79.82 LONG TERM (CURRENT) USE OF ASPIRIN: ICD-10-CM

## 2024-08-20 DIAGNOSIS — J43.9 EMPHYSEMA, UNSPECIFIED: ICD-10-CM

## 2024-08-20 DIAGNOSIS — F02.80 DEMENTIA IN OTHER DISEASES CLASSIFIED ELSEWHERE, UNSPECIFIED SEVERITY, WITHOUT BEHAVIORAL DISTURBANCE, PSYCHOTIC DISTURBANCE, MOOD DISTURBANCE, AND ANXIETY: ICD-10-CM

## 2024-08-20 DIAGNOSIS — E87.20 ACIDOSIS, UNSPECIFIED: ICD-10-CM

## 2024-08-20 DIAGNOSIS — J69.0 PNEUMONITIS DUE TO INHALATION OF FOOD AND VOMIT: ICD-10-CM

## 2024-08-20 DIAGNOSIS — Z88.0 ALLERGY STATUS TO PENICILLIN: ICD-10-CM

## 2024-08-20 DIAGNOSIS — Z79.818 LONG TERM (CURRENT) USE OF OTHER AGENTS AFFECTING ESTROGEN RECEPTORS AND ESTROGEN LEVELS: ICD-10-CM

## 2024-08-20 DIAGNOSIS — Z74.01 BED CONFINEMENT STATUS: ICD-10-CM

## 2024-08-20 DIAGNOSIS — Z99.3 DEPENDENCE ON WHEELCHAIR: ICD-10-CM

## 2024-08-20 DIAGNOSIS — Z66 DO NOT RESUSCITATE: ICD-10-CM
